# Patient Record
Sex: FEMALE | Race: WHITE | Employment: OTHER | URBAN - METROPOLITAN AREA
[De-identification: names, ages, dates, MRNs, and addresses within clinical notes are randomized per-mention and may not be internally consistent; named-entity substitution may affect disease eponyms.]

---

## 2017-01-12 ENCOUNTER — OFFICE VISIT (OUTPATIENT)
Dept: NEUROLOGY | Facility: CLINIC | Age: 82
End: 2017-01-12

## 2017-01-12 VITALS
BODY MASS INDEX: 19.29 KG/M2 | WEIGHT: 113 LBS | SYSTOLIC BLOOD PRESSURE: 112 MMHG | DIASTOLIC BLOOD PRESSURE: 76 MMHG | OXYGEN SATURATION: 96 % | HEART RATE: 90 BPM | HEIGHT: 64 IN

## 2017-01-12 DIAGNOSIS — M54.16 LUMBAR RADICULOPATHY: ICD-10-CM

## 2017-01-12 DIAGNOSIS — M48.061 SPINAL STENOSIS, LUMBAR REGION, WITHOUT NEUROGENIC CLAUDICATION: ICD-10-CM

## 2017-01-12 DIAGNOSIS — M85.69: ICD-10-CM

## 2017-01-12 DIAGNOSIS — M51.37 DEGENERATION OF LUMBAR OR LUMBOSACRAL INTERVERTEBRAL DISC: ICD-10-CM

## 2017-01-12 DIAGNOSIS — G89.29 CHRONIC LEFT-SIDED LOW BACK PAIN WITH LEFT-SIDED SCIATICA: ICD-10-CM

## 2017-01-12 DIAGNOSIS — M54.42 CHRONIC LEFT-SIDED LOW BACK PAIN WITH LEFT-SIDED SCIATICA: ICD-10-CM

## 2017-01-12 DIAGNOSIS — M53.3 CHRONIC LEFT SACROILIAC JOINT PAIN: ICD-10-CM

## 2017-01-12 DIAGNOSIS — M51.9 OTHER AND UNSPECIFIED DISC DISORDER OF LUMBAR REGION: ICD-10-CM

## 2017-01-12 DIAGNOSIS — S32.009A: ICD-10-CM

## 2017-01-12 DIAGNOSIS — M43.10 ACQUIRED SPONDYLOLISTHESIS: Primary | ICD-10-CM

## 2017-01-12 DIAGNOSIS — G89.29 CHRONIC LEFT SACROILIAC JOINT PAIN: ICD-10-CM

## 2017-01-12 PROCEDURE — 99214 OFFICE O/P EST MOD 30 MIN: CPT | Performed by: PHYSICAL MEDICINE & REHABILITATION

## 2017-01-12 NOTE — PATIENT INSTRUCTIONS
Refill policies:    • Allow 2 business days for refills; controlled substances may take longer. • Contact our office at least 5 days prior to running out of medication or submit request through the “request refill” option in your [employer] account.   • Ref have a procedure or additional testing performed. [facility]) will contact your insurance carrier to obtain pre-certification or prior authorization.     Unfortunately, [facility] has seen an increase in denial of payment even though the p

## 2017-01-12 NOTE — PROGRESS NOTES
Low Back Pain H & P    Chief Complaint: Patient presents with:  Low Back Pain: Pt c/o left sided low back pain radiating to the back of her left knee. Pain varies from 3-7/10. Current pain level is 3/10 today. Pain increases when walking,standing,sitting.  P upper and lower endoscopy; 2011; Dr Gildardo Giles because of anemia - normal.    BENIGN BIOPSY RIGHT  40 years ago    IR VERTEBROPLASTY         Family History   Family History   Problem Relation Age of Onset   • [other] [OTHER] Father      cva   • Cancer Mother 40 degrees Painless with bilateral low back tightness   Extension with LEFT rotation: 40 degrees Painless with bilateral low back tightness     Lumbar Spine Palpation:    Spinous Processes: Non-tender for all Spinous Processes   Z-joints: Non-tender for al Gaenslen's Test: Negative    Assessment  1. L3-4 stable>L4-5 unstable grade1 spondylolisthesis    2. left L4-5 mod synovial cyst    3. L5-S1 lt mod far lat,L4-5 lt>rt mild-mod discs    4. L1-2rt mod paris, L3-4 & L5-S1 mild diff discs    5.  L4-5 lt mod lat

## 2017-01-12 NOTE — PROGRESS NOTES
Patient has been scheduled for a left SI joint injection  on 02/03/17 at [facility]. Medications and allergies reviewed. Patient informed to hold aspirins, nsaids, blood thinners, vitamins and fish oils 5-7 days prior to procedure.  Patient informed we will ne

## 2017-01-16 ENCOUNTER — TELEPHONE (OUTPATIENT)
Dept: NEUROLOGY | Facility: CLINIC | Age: 82
End: 2017-01-16

## 2017-01-30 ENCOUNTER — TELEPHONE (OUTPATIENT)
Dept: NEUROLOGY | Facility: CLINIC | Age: 82
End: 2017-01-30

## 2017-01-31 NOTE — TELEPHONE ENCOUNTER
Spoke to patient. She states that she does not need to reschedule and 2/10 should work for her. She will await a phone call from [facility] to notify her of time for injection on 2/10.

## 2017-02-10 ENCOUNTER — OFFICE VISIT (OUTPATIENT)
Dept: SURGERY | Facility: CLINIC | Age: 82
End: 2017-02-10

## 2017-02-10 DIAGNOSIS — M53.3 CHRONIC LEFT SACROILIAC JOINT PAIN: Primary | ICD-10-CM

## 2017-02-10 DIAGNOSIS — G89.29 CHRONIC LEFT SACROILIAC JOINT PAIN: Primary | ICD-10-CM

## 2017-02-10 PROCEDURE — 27096 INJECT SACROILIAC JOINT: CPT | Performed by: PHYSICAL MEDICINE & REHABILITATION

## 2017-02-10 NOTE — PROCEDURES
Bryant Rizvi.    UNILATERAL SACROILIAC JOINT INJECTION  NAME:  Roslyn Sandy    MR #:    [id number] :  1933     PHYSICIAN:  Jocelyn Linda        Operative Report    DATE OF PROCEDURE: 2/10/2017   PREOPERATIVE DIAGNOSES: 1.  C anytime.

## 2017-02-15 ENCOUNTER — TELEPHONE (OUTPATIENT)
Dept: NEUROLOGY | Facility: CLINIC | Age: 82
End: 2017-02-15

## 2017-02-15 DIAGNOSIS — M51.37 DEGENERATION OF LUMBAR OR LUMBOSACRAL INTERVERTEBRAL DISC: ICD-10-CM

## 2017-02-15 DIAGNOSIS — M54.42 CHRONIC LEFT-SIDED LOW BACK PAIN WITH LEFT-SIDED SCIATICA: ICD-10-CM

## 2017-02-15 DIAGNOSIS — M54.16 LUMBAR RADICULOPATHY: ICD-10-CM

## 2017-02-15 DIAGNOSIS — M43.10 ACQUIRED SPONDYLOLISTHESIS: Primary | ICD-10-CM

## 2017-02-15 DIAGNOSIS — G89.29 CHRONIC LEFT SACROILIAC JOINT PAIN: ICD-10-CM

## 2017-02-15 DIAGNOSIS — M53.3 CHRONIC LEFT SACROILIAC JOINT PAIN: ICD-10-CM

## 2017-02-15 DIAGNOSIS — G89.29 CHRONIC LEFT-SIDED LOW BACK PAIN WITH LEFT-SIDED SCIATICA: ICD-10-CM

## 2017-02-15 NOTE — TELEPHONE ENCOUNTER
Left message advising Pt. insurance was verified and MRI L-spine wo is a covered benefit and does not require authorization. Can proceed with scheduling appt. Chava Rice

## 2017-02-15 NOTE — TELEPHONE ENCOUNTER
Patient calling with condition update post left sacroiliac joint injection on 2/10/17. Patient states she does not notice any relief and has the same cramping, tightness and pain in the outer left side low back and left lateral hip area.  Pain is intermitte

## 2017-02-24 ENCOUNTER — HOSPITAL ENCOUNTER (OUTPATIENT)
Dept: MRI IMAGING | Facility: HOSPITAL | Age: 82
Discharge: HOME | End: 2017-02-24
Attending: PHYSICAL MEDICINE & REHABILITATION
Payer: MEDICARE

## 2017-02-24 ENCOUNTER — HOSPITAL ENCOUNTER (OUTPATIENT)
Dept: GENERAL RADIOLOGY | Facility: HOSPITAL | Age: 82
Discharge: HOME | End: 2017-02-24
Attending: PHYSICAL MEDICINE & REHABILITATION
Payer: MEDICARE

## 2017-02-24 DIAGNOSIS — M54.16 LUMBAR RADICULOPATHY: ICD-10-CM

## 2017-02-24 DIAGNOSIS — M51.37 DEGENERATION OF LUMBAR OR LUMBOSACRAL INTERVERTEBRAL DISC: ICD-10-CM

## 2017-02-24 DIAGNOSIS — G89.29 CHRONIC LEFT SACROILIAC JOINT PAIN: ICD-10-CM

## 2017-02-24 DIAGNOSIS — M54.42 CHRONIC LEFT-SIDED LOW BACK PAIN WITH LEFT-SIDED SCIATICA: ICD-10-CM

## 2017-02-24 DIAGNOSIS — G89.29 CHRONIC LEFT-SIDED LOW BACK PAIN WITH LEFT-SIDED SCIATICA: ICD-10-CM

## 2017-02-24 DIAGNOSIS — M43.10 ACQUIRED SPONDYLOLISTHESIS: ICD-10-CM

## 2017-02-24 DIAGNOSIS — M53.3 CHRONIC LEFT SACROILIAC JOINT PAIN: ICD-10-CM

## 2017-02-24 PROCEDURE — 72120 X-RAY BEND ONLY L-S SPINE: CPT

## 2017-02-24 PROCEDURE — 72148 MRI LUMBAR SPINE W/O DYE: CPT

## 2017-03-23 ENCOUNTER — OFFICE VISIT (OUTPATIENT)
Dept: NEUROLOGY | Facility: CLINIC | Age: 82
End: 2017-03-23

## 2017-03-23 ENCOUNTER — TELEPHONE (OUTPATIENT)
Dept: NEUROLOGY | Facility: CLINIC | Age: 82
End: 2017-03-23

## 2017-03-23 VITALS
HEART RATE: 77 BPM | DIASTOLIC BLOOD PRESSURE: 60 MMHG | RESPIRATION RATE: 14 BRPM | SYSTOLIC BLOOD PRESSURE: 108 MMHG | HEIGHT: 64 IN

## 2017-03-23 DIAGNOSIS — M48.061 LUMBAR FORAMINAL STENOSIS: ICD-10-CM

## 2017-03-23 DIAGNOSIS — M54.16 LUMBAR RADICULOPATHY: ICD-10-CM

## 2017-03-23 DIAGNOSIS — S22.000D THORACIC COMPRESSION FRACTURE, WITH ROUTINE HEALING, SUBSEQUENT ENCOUNTER: ICD-10-CM

## 2017-03-23 DIAGNOSIS — G89.29 CHRONIC LEFT-SIDED THORACIC BACK PAIN: ICD-10-CM

## 2017-03-23 DIAGNOSIS — M54.6 CHRONIC LEFT-SIDED THORACIC BACK PAIN: ICD-10-CM

## 2017-03-23 DIAGNOSIS — M43.16 SPONDYLOLISTHESIS, LUMBAR REGION: ICD-10-CM

## 2017-03-23 DIAGNOSIS — S32.000D LUMBAR COMPRESSION FRACTURE, WITH ROUTINE HEALING, SUBSEQUENT ENCOUNTER: ICD-10-CM

## 2017-03-23 DIAGNOSIS — M54.42 CHRONIC LEFT-SIDED LOW BACK PAIN WITH LEFT-SIDED SCIATICA: Primary | ICD-10-CM

## 2017-03-23 DIAGNOSIS — G89.29 CHRONIC LEFT-SIDED LOW BACK PAIN WITH LEFT-SIDED SCIATICA: Primary | ICD-10-CM

## 2017-03-23 PROBLEM — S22.000A THORACIC COMPRESSION FRACTURE (HCC): Status: ACTIVE | Noted: 2017-03-23

## 2017-03-23 PROBLEM — S32.000A LUMBAR COMPRESSION FRACTURE (HCC): Status: ACTIVE | Noted: 2017-03-23

## 2017-03-23 PROCEDURE — 99214 OFFICE O/P EST MOD 30 MIN: CPT | Performed by: PHYSICAL MEDICINE & REHABILITATION

## 2017-03-23 RX ORDER — AZELASTINE 1 MG/ML
SPRAY, METERED NASAL 2 TIMES DAILY PRN
COMMUNITY
Start: 2017-03-07 | End: 2017-08-31

## 2017-03-23 NOTE — TELEPHONE ENCOUNTER
Pt. informed insurance was verified and Physical therapy is a covered benefit and does not require authorization. Can proceed with scheduling appt.

## 2017-03-23 NOTE — PROGRESS NOTES
Low Back Pain H & P    Chief Complaint: Patient presents with:  Low Back Pain: pt here for follow up after left SI Joint injections on 2/10/17 with no relief at all.  pt continues to have intermittent mid-lower back pain  LOV 1/12/17      Patient was last s cva   • Cancer Mother [de-identified]     breast cancer   • Breast Cancer Mother    • Cancer Other      breast   • Breast Cancer Maternal Aunt        Social History     Social History   Marital Status:   Spouse Name: N/A    Years of Education: N/A  Number o bilateral Lower Extremities   LE Muscle Strength:  All LE strength measurements 5/5 except:  Hamstring LEFT:   4+/5   RIGHT plantar reflexes: downward response   LEFT plantar reflexes: downward response   Reflexes: 2+ in bilateral lower extremities     Radi

## 2017-03-23 NOTE — PATIENT INSTRUCTIONS
As of October 6th 2014, the Drug Enforcement Agency [facility]) is reclassifying all hydrocodone combination medications from Schedule III to Schedule II. This includes medications such as Norco, Vicodin, Lortab, Zohydro, and Vicoprofen.     What this means for y chart.      Plan  She will start PT again. The patient will continue with her home exercise program.    The patient does not need any injections at this time. The patient does not need any pain medications at this time.     She will follow up in 6 mon

## 2017-07-17 PROCEDURE — 82607 VITAMIN B-12: CPT | Performed by: INTERNAL MEDICINE

## 2017-07-17 PROCEDURE — 82746 ASSAY OF FOLIC ACID SERUM: CPT | Performed by: INTERNAL MEDICINE

## 2017-07-26 PROBLEM — M48.061 LUMBAR SPINAL STENOSIS: Status: ACTIVE | Noted: 2017-07-26

## 2017-08-09 PROBLEM — M54.16 LUMBAR RADICULITIS: Status: ACTIVE | Noted: 2017-08-09

## 2017-08-31 PROBLEM — R25.2 CRAMP OF EXTREMITY: Status: ACTIVE | Noted: 2017-08-31

## 2017-09-29 PROCEDURE — 87186 SC STD MICRODIL/AGAR DIL: CPT | Performed by: UROLOGY

## 2017-09-29 PROCEDURE — 87086 URINE CULTURE/COLONY COUNT: CPT | Performed by: UROLOGY

## 2017-09-29 PROCEDURE — 87077 CULTURE AEROBIC IDENTIFY: CPT | Performed by: UROLOGY

## 2017-10-05 PROBLEM — K21.9 GASTROESOPHAGEAL REFLUX DISEASE WITHOUT ESOPHAGITIS: Status: ACTIVE | Noted: 2017-10-05

## 2017-10-05 PROBLEM — R49.0 HOARSENESS OF VOICE: Status: ACTIVE | Noted: 2017-10-05

## 2017-12-04 PROCEDURE — 87086 URINE CULTURE/COLONY COUNT: CPT | Performed by: UROLOGY

## 2017-12-09 PROCEDURE — 87086 URINE CULTURE/COLONY COUNT: CPT | Performed by: PHYSICIAN ASSISTANT

## 2017-12-19 PROCEDURE — 87086 URINE CULTURE/COLONY COUNT: CPT | Performed by: UROLOGY

## 2017-12-19 PROCEDURE — 87186 SC STD MICRODIL/AGAR DIL: CPT | Performed by: UROLOGY

## 2017-12-19 PROCEDURE — 87088 URINE BACTERIA CULTURE: CPT | Performed by: UROLOGY

## 2018-01-07 ENCOUNTER — HOSPITAL ENCOUNTER (EMERGENCY)
Facility: HOSPITAL | Age: 83
Discharge: HOME | End: 2018-01-07
Attending: EMERGENCY MEDICINE
Payer: MEDICARE

## 2018-01-07 ENCOUNTER — APPOINTMENT (OUTPATIENT)
Dept: GENERAL RADIOLOGY | Facility: HOSPITAL | Age: 83
End: 2018-01-07
Attending: EMERGENCY MEDICINE
Payer: MEDICARE

## 2018-01-07 VITALS
RESPIRATION RATE: 16 BRPM | OXYGEN SATURATION: 95 % | WEIGHT: 113 LBS | HEIGHT: 64 IN | BODY MASS INDEX: 19.29 KG/M2 | DIASTOLIC BLOOD PRESSURE: 57 MMHG | TEMPERATURE: 100 F | SYSTOLIC BLOOD PRESSURE: 159 MMHG | HEART RATE: 82 BPM

## 2018-01-07 DIAGNOSIS — J11.1 INFLUENZA: Primary | ICD-10-CM

## 2018-01-07 LAB
ANION GAP SERPL CALC-SCNC: 11 MMOL/L (ref 0–18)
BASOPHILS # BLD: 0 K/UL (ref 0–0.2)
BASOPHILS NFR BLD: 1 %
BILIRUB UR QL: NEGATIVE
BUN SERPL-MCNC: 9 MG/DL (ref 8–20)
BUN/CREAT SERPL: 15.3 (ref 10–20)
CALCIUM SERPL-MCNC: 8.8 MG/DL (ref 8.5–10.5)
CHLORIDE SERPL-SCNC: 97 MMOL/L (ref 95–110)
CLARITY UR: CLEAR
CO2 SERPL-SCNC: 22 MMOL/L (ref 22–32)
CREAT SERPL-MCNC: 0.59 MG/DL (ref 0.5–1.5)
EOSINOPHIL # BLD: 0 K/UL (ref 0–0.7)
EOSINOPHIL NFR BLD: 1 %
ERYTHROCYTE [DISTWIDTH] IN BLOOD BY AUTOMATED COUNT: 13.9 % (ref 11–15)
FLUAV + FLUBV RNA SPEC NAA+PROBE: NEGATIVE
FLUAV + FLUBV RNA SPEC NAA+PROBE: NEGATIVE
FLUAV + FLUBV RNA SPEC NAA+PROBE: POSITIVE
GLUCOSE SERPL-MCNC: 128 MG/DL (ref 70–99)
GLUCOSE UR-MCNC: NEGATIVE MG/DL
HCT VFR BLD AUTO: 33.8 % (ref 35–48)
HGB BLD-MCNC: 11.1 G/DL (ref 12–16)
HGB UR QL STRIP.AUTO: NEGATIVE
LEUKOCYTE ESTERASE UR QL STRIP.AUTO: NEGATIVE
LYMPHOCYTES # BLD: 0.3 K/UL (ref 1–4)
LYMPHOCYTES NFR BLD: 9 %
MCH RBC QN AUTO: 30 PG (ref 27–32)
MCHC RBC AUTO-ENTMCNC: 32.7 G/DL (ref 32–37)
MCV RBC AUTO: 91.6 FL (ref 80–100)
MONOCYTES # BLD: 0.6 K/UL (ref 0–1)
MONOCYTES NFR BLD: 17 %
NEUTROPHILS # BLD AUTO: 2.7 K/UL (ref 1.8–7.7)
NEUTROPHILS NFR BLD: 72 %
NITRITE UR QL STRIP.AUTO: POSITIVE
OSMOLALITY UR CALC.SUM OF ELEC: 270 MOSM/KG (ref 275–295)
PH UR: 7 [PH] (ref 5–8)
PLATELET # BLD AUTO: 173 K/UL (ref 140–400)
PMV BLD AUTO: 8 FL (ref 7.4–10.3)
POTASSIUM SERPL-SCNC: 3.5 MMOL/L (ref 3.3–5.1)
PROT UR-MCNC: NEGATIVE MG/DL
RBC # BLD AUTO: 3.69 M/UL (ref 3.7–5.4)
RBC #/AREA URNS AUTO: 0 /HPF
SODIUM SERPL-SCNC: 130 MMOL/L (ref 136–144)
SP GR UR STRIP: 1.01 (ref 1–1.03)
UROBILINOGEN UR STRIP-ACNC: 4
VIT C UR-MCNC: 40 MG/DL
WBC # BLD AUTO: 3.7 K/UL (ref 4–11)
WBC #/AREA URNS AUTO: 3 /HPF

## 2018-01-07 PROCEDURE — 36415 COLL VENOUS BLD VENIPUNCTURE: CPT

## 2018-01-07 PROCEDURE — 85025 COMPLETE CBC W/AUTO DIFF WBC: CPT | Performed by: EMERGENCY MEDICINE

## 2018-01-07 PROCEDURE — 87631 RESP VIRUS 3-5 TARGETS: CPT | Performed by: EMERGENCY MEDICINE

## 2018-01-07 PROCEDURE — 80048 BASIC METABOLIC PNL TOTAL CA: CPT | Performed by: EMERGENCY MEDICINE

## 2018-01-07 PROCEDURE — 81001 URINALYSIS AUTO W/SCOPE: CPT | Performed by: EMERGENCY MEDICINE

## 2018-01-07 PROCEDURE — 71045 X-RAY EXAM CHEST 1 VIEW: CPT | Performed by: EMERGENCY MEDICINE

## 2018-01-07 PROCEDURE — 99284 EMERGENCY DEPT VISIT MOD MDM: CPT

## 2018-01-07 RX ORDER — OSELTAMIVIR PHOSPHATE 75 MG/1
75 CAPSULE ORAL 2 TIMES DAILY
Qty: 10 CAPSULE | Refills: 0 | Status: SHIPPED | OUTPATIENT
Start: 2018-01-07 | End: 2018-01-12

## 2018-01-07 RX ORDER — IBUPROFEN 400 MG/1
400 TABLET ORAL ONCE
Status: COMPLETED | OUTPATIENT
Start: 2018-01-07 | End: 2018-01-07

## 2018-01-07 RX ORDER — ACETAMINOPHEN 500 MG
1000 TABLET ORAL ONCE
Status: COMPLETED | OUTPATIENT
Start: 2018-01-07 | End: 2018-01-07

## 2018-01-07 NOTE — ED INITIAL ASSESSMENT (HPI)
Resident of [facility], started feeling weak with chills and cough with white sputum this morning.

## 2018-01-08 NOTE — ED PROVIDER NOTES
Patient Seen in: [facility] Emergency Department    History   Patient presents with:  Fatigue (constitutional, neurologic)      HPI    Patient presents from her care home community due to feeling weak this morning with associated chills, body ach Yes    Social History Narrative    The patient does not use an assistive device. .      The patient does live in a home with stairs.         ROS  Pertinent Positives: Cough, congestion, weak, body aches, chills  All other organ systems are reviewed and for the following:     Influenza B by PCR Positive (*)     All other components within normal limits   CBC W/ DIFFERENTIAL - Abnormal; Notable for the following:     WBC 3.7 (*)     RBC 3.69 (*)     HGB 11.1 (*)     HCT 33.8 (*)     Lymphocyte Absolute 0.3 Hypothyroid; Recurrent UTI; H/O: hysterectomy; Factor V Leiden mutation ([facility] Compression fracture; Anemia; Actinic keratosis; PAF (paroxysmal atrial fibrillation) ([facility] Senile osteoporosis;  Pathologic fracture of vertebrae; L3-4 stable>L4-5 unstable gra Course: Presents with influenza symptoms starting this morning. No distress on examination, appears well. Vital signs stable, laboratory testing positive for influenza B, otherwise unremarkable.   Patient feeling better in the ED, given instructions on [place]

## 2018-01-08 NOTE — ED NOTES
[facility] called for medicar back to [facility].   ETA 60 minutes

## 2019-02-26 ENCOUNTER — OFFICE VISIT (OUTPATIENT)
Dept: INTEGRATIVE MEDICINE | Facility: CLINIC | Age: 84
End: 2019-02-26

## 2019-02-26 DIAGNOSIS — M54.9 UPPER BACK PAIN: Primary | ICD-10-CM

## 2019-02-26 NOTE — PROGRESS NOTES
Justina High is a 80year old female Acupuncture Therapy. Complaints:  1. Upper back ache and pain 5/10  2. Trapezius stiffness 5/10  3.  Right hip pain    Initial intake: Patient has been have back pain for ten years since suffering a compression frac

## 2019-03-05 ENCOUNTER — OFFICE VISIT (OUTPATIENT)
Dept: INTEGRATIVE MEDICINE | Facility: CLINIC | Age: 84
End: 2019-03-05

## 2019-03-05 DIAGNOSIS — G89.29 CHRONIC BACK PAIN, UNSPECIFIED BACK LOCATION, UNSPECIFIED BACK PAIN LATERALITY: Primary | ICD-10-CM

## 2019-03-05 DIAGNOSIS — M54.9 CHRONIC BACK PAIN, UNSPECIFIED BACK LOCATION, UNSPECIFIED BACK PAIN LATERALITY: Primary | ICD-10-CM

## 2019-03-05 NOTE — PROGRESS NOTES
Esequiel Morgan is a 80year old female Acupuncture Therapy. Complaints:  1. Upper back ache and pain 3-4/10  2. Trapezius stiffness 3-4/10  3. Right hip pain  4.  Sinus congestion -chronic     Initial intake: Patient has been have back pain for ten years

## 2019-03-05 NOTE — PATIENT INSTRUCTIONS
Drink plenty of water - at least 8 glasses per day and avoid caffeine.   Continue putting heat on low back

## 2019-03-07 ENCOUNTER — OFFICE VISIT (OUTPATIENT)
Dept: INTEGRATIVE MEDICINE | Facility: CLINIC | Age: 84
End: 2019-03-07

## 2019-03-07 DIAGNOSIS — M54.2 BILATERAL NECK PAIN: ICD-10-CM

## 2019-03-08 NOTE — PROGRESS NOTES
Zaid Gao is a 80year old female Acupuncture Therapy. Complaints:  1. Upper back ache and pain 3-4/10  2. Trapezius stiffness 3-4/10  3. Right hip pain  4.  Sinus congestion -chronic     Initial intake: Patient has been have back pain for ten years

## 2019-03-12 ENCOUNTER — OFFICE VISIT (OUTPATIENT)
Dept: INTEGRATIVE MEDICINE | Facility: CLINIC | Age: 84
End: 2019-03-12

## 2019-03-12 DIAGNOSIS — M54.2 NECK PAIN: Primary | ICD-10-CM

## 2019-03-12 NOTE — PROGRESS NOTES
Paul Player is a 80year old female Acupuncture Therapy. Complaints:  1. Upper back ache and pain 5/10  2. Trapezius stiffness 3-4/10  3. Right hip pain  4.  Sinus congestion -chronic     Initial intake: Patient has been have back pain for ten years s

## 2019-03-13 ENCOUNTER — OFFICE VISIT (OUTPATIENT)
Dept: FAMILY MEDICINE CLINIC | Facility: CLINIC | Age: 84
End: 2019-03-13
Payer: MEDICARE

## 2019-03-13 DIAGNOSIS — M62.9 HAMSTRING TIGHTNESS OF BOTH LOWER EXTREMITIES: ICD-10-CM

## 2019-03-13 DIAGNOSIS — M99.01 CERVICAL SEGMENT DYSFUNCTION: ICD-10-CM

## 2019-03-13 DIAGNOSIS — M99.08 RIB CAGE REGION SOMATIC DYSFUNCTION: ICD-10-CM

## 2019-03-13 DIAGNOSIS — M54.2 NECK PAIN: ICD-10-CM

## 2019-03-13 DIAGNOSIS — M54.6 BILATERAL THORACIC BACK PAIN, UNSPECIFIED CHRONICITY: ICD-10-CM

## 2019-03-13 DIAGNOSIS — G89.29 CHRONIC BILATERAL LOW BACK PAIN WITH SCIATICA, SCIATICA LATERALITY UNSPECIFIED: Primary | ICD-10-CM

## 2019-03-13 DIAGNOSIS — M99.00 HEAD REGION SOMATIC DYSFUNCTION: ICD-10-CM

## 2019-03-13 DIAGNOSIS — G57.03 PIRIFORMIS SYNDROME OF BOTH SIDES: ICD-10-CM

## 2019-03-13 DIAGNOSIS — M99.03 SOMATIC DYSFUNCTION OF LUMBAR REGION: ICD-10-CM

## 2019-03-13 DIAGNOSIS — M54.40 CHRONIC BILATERAL LOW BACK PAIN WITH SCIATICA, SCIATICA LATERALITY UNSPECIFIED: Primary | ICD-10-CM

## 2019-03-13 DIAGNOSIS — M99.04 SOMATIC DYSFUNCTION OF SACRAL REGION: ICD-10-CM

## 2019-03-13 PROCEDURE — 99214 OFFICE O/P EST MOD 30 MIN: CPT | Performed by: FAMILY MEDICINE

## 2019-03-13 PROCEDURE — 98929 OSTEOPATH MANJ 9-10 REGIONS: CPT | Performed by: FAMILY MEDICINE

## 2019-03-13 NOTE — PATIENT INSTRUCTIONS
Breathing. Self activations. Proper exercising. Counseling. Positive mental imaging. Follow-up in 3-4 weeks, as needed. Call with questions or problems. Discussed bruising.

## 2019-03-13 NOTE — PROGRESS NOTES
HPI:    Patient ID: Justina High is a 80year old female. Patient with chronic low back pain. Left greater than right. Complaints of weakness to left leg. Right hamstring greater than left hamstring tightness.   States she has glutes Fernando tendinitis 0/0  Hamstring 45 degrees  Quad 0/0  Decrease quadratus lumborum/laterals  Decreased shoulder/sternocleidomastoid  Piriformis 100 degrees  Elevated first rib  Jaw tight  Visuals decreased distant  Activations x9  Abdominal breathing  Psoas/glutes 2/2  Hams

## 2019-03-14 ENCOUNTER — OFFICE VISIT (OUTPATIENT)
Dept: INTEGRATIVE MEDICINE | Facility: CLINIC | Age: 84
End: 2019-03-14

## 2019-03-14 DIAGNOSIS — M54.2 NECK PAIN: Primary | ICD-10-CM

## 2019-03-19 ENCOUNTER — OFFICE VISIT (OUTPATIENT)
Dept: INTEGRATIVE MEDICINE | Facility: CLINIC | Age: 84
End: 2019-03-19

## 2019-03-19 DIAGNOSIS — G89.29 CHRONIC BACK PAIN, UNSPECIFIED BACK LOCATION, UNSPECIFIED BACK PAIN LATERALITY: Primary | ICD-10-CM

## 2019-03-19 DIAGNOSIS — M54.9 CHRONIC BACK PAIN, UNSPECIFIED BACK LOCATION, UNSPECIFIED BACK PAIN LATERALITY: Primary | ICD-10-CM

## 2019-03-19 NOTE — PROGRESS NOTES
Korey Chase is a 80year old female Acupuncture Therapy. Complaints:  1. Upper back ache and pain 3/10  2. Trapezius stiffness 3-4/10  3. Right hip pain  4.  Sinus congestion -chronic     Initial intake: Patient has been have back pain for ten years s

## 2019-03-21 ENCOUNTER — OFFICE VISIT (OUTPATIENT)
Dept: INTEGRATIVE MEDICINE | Facility: CLINIC | Age: 84
End: 2019-03-21

## 2019-03-21 DIAGNOSIS — M54.2 BILATERAL NECK PAIN: ICD-10-CM

## 2019-03-26 ENCOUNTER — OFFICE VISIT (OUTPATIENT)
Dept: INTEGRATIVE MEDICINE | Facility: CLINIC | Age: 84
End: 2019-03-26

## 2019-03-26 DIAGNOSIS — M54.9 UPPER BACK PAIN: Primary | ICD-10-CM

## 2019-03-26 NOTE — PROGRESS NOTES
Vani Stanley is a 80year old female Acupuncture Therapy. Complaints:  1. Upper back ache and pain 3-4/10  2. Trapezius stiffness 3-4/10  3. Right hip pain  4.  Sinus congestion -chronic     Initial intake: Patient has been have back pain for ten years

## 2019-03-26 NOTE — PATIENT INSTRUCTIONS
Continue to utilize heat 20 minutes on and drink plently of fluids; at least 8-10 glasses of water per day.

## 2019-04-02 NOTE — PROGRESS NOTES
Jolynn Rodriguez is a 80year old female Acupuncture Therapy. Complaints:  1. Upper back ache and pain 5/10  2. Trapezius stiffness 3-4/10  3. Right hip pain  4.  Sinus congestion -chronic     Initial intake: Patient has been have back pain for ten years s

## 2019-04-03 ENCOUNTER — OFFICE VISIT (OUTPATIENT)
Dept: FAMILY MEDICINE CLINIC | Facility: CLINIC | Age: 84
End: 2019-04-03
Payer: MEDICARE

## 2019-04-03 DIAGNOSIS — M99.08 RIB CAGE REGION SOMATIC DYSFUNCTION: ICD-10-CM

## 2019-04-03 DIAGNOSIS — M99.04 SOMATIC DYSFUNCTION OF SACRAL REGION: ICD-10-CM

## 2019-04-03 DIAGNOSIS — G57.01 PIRIFORMIS SYNDROME OF RIGHT SIDE: ICD-10-CM

## 2019-04-03 DIAGNOSIS — M54.2 NECK PAIN: ICD-10-CM

## 2019-04-03 DIAGNOSIS — M54.6 BILATERAL THORACIC BACK PAIN, UNSPECIFIED CHRONICITY: ICD-10-CM

## 2019-04-03 DIAGNOSIS — M99.00 HEAD REGION SOMATIC DYSFUNCTION: ICD-10-CM

## 2019-04-03 DIAGNOSIS — M99.03 SOMATIC DYSFUNCTION OF LUMBAR REGION: ICD-10-CM

## 2019-04-03 DIAGNOSIS — M54.40 CHRONIC BILATERAL LOW BACK PAIN WITH SCIATICA, SCIATICA LATERALITY UNSPECIFIED: Primary | ICD-10-CM

## 2019-04-03 DIAGNOSIS — M99.01 CERVICAL SEGMENT DYSFUNCTION: ICD-10-CM

## 2019-04-03 DIAGNOSIS — M62.9 HAMSTRING TIGHTNESS OF BOTH LOWER EXTREMITIES: ICD-10-CM

## 2019-04-03 DIAGNOSIS — G89.29 CHRONIC BILATERAL LOW BACK PAIN WITH SCIATICA, SCIATICA LATERALITY UNSPECIFIED: Primary | ICD-10-CM

## 2019-04-03 PROCEDURE — 99214 OFFICE O/P EST MOD 30 MIN: CPT | Performed by: FAMILY MEDICINE

## 2019-04-03 PROCEDURE — 98929 OSTEOPATH MANJ 9-10 REGIONS: CPT | Performed by: FAMILY MEDICINE

## 2019-04-03 NOTE — PROGRESS NOTES
HPI:    Patient ID: Carolina  is a 80year old female. Patient improvement with activations. Low back pain improved. Thoracic back pain improved. Still some discomfort. Denies numbness, tingling. Hamstring tightness right greater than left.   Hi psoas flexed position  Elevated first rib  Jaw tight  Piriformis 90 degrees  Decrease hamstring bilateral  Decreased muscle strength with fear of falling  Activations x9  Abdominal breathing  Psoas/glutes 2/2  Quad 2/2  Psoas intact flexed position  Lower

## 2019-04-04 NOTE — PROGRESS NOTES
Vani Stanley is a 80year old female Acupuncture Therapy. Complaints:  1. Upper back ache and pain 3/10  2. Trapezius stiffness 3-4/10  3. Right hip pain  4.  Sinus congestion -chronic     Initial intake: Patient has been have back pain for ten years s

## 2021-11-19 PROBLEM — E46 PROTEIN-CALORIE MALNUTRITION, UNSPECIFIED SEVERITY (HCC): Status: ACTIVE | Noted: 2021-11-19

## 2021-12-13 RX ORDER — ACETAMINOPHEN 500 MG
1000 TABLET ORAL ONCE
Status: CANCELLED | OUTPATIENT
Start: 2021-12-13 | End: 2021-12-13

## 2021-12-14 ENCOUNTER — LAB ENCOUNTER (OUTPATIENT)
Dept: LAB | Facility: HOSPITAL | Age: 86
End: 2021-12-14
Attending: OTOLARYNGOLOGY
Payer: MEDICARE

## 2021-12-14 DIAGNOSIS — J38.3 OTHER DISEASES OF VOCAL CORDS: ICD-10-CM

## 2021-12-17 ENCOUNTER — HOSPITAL ENCOUNTER (OUTPATIENT)
Facility: HOSPITAL | Age: 86
Setting detail: HOSPITAL OUTPATIENT SURGERY
Discharge: HOME | End: 2021-12-17
Attending: OTOLARYNGOLOGY | Admitting: OTOLARYNGOLOGY
Payer: MEDICARE

## 2021-12-17 ENCOUNTER — ANESTHESIA EVENT (OUTPATIENT)
Dept: SURGERY | Facility: HOSPITAL | Age: 86
End: 2021-12-17
Payer: MEDICARE

## 2021-12-17 ENCOUNTER — ANESTHESIA (OUTPATIENT)
Dept: SURGERY | Facility: HOSPITAL | Age: 86
End: 2021-12-17
Payer: MEDICARE

## 2021-12-17 VITALS
RESPIRATION RATE: 18 BRPM | DIASTOLIC BLOOD PRESSURE: 69 MMHG | WEIGHT: 119.69 LBS | SYSTOLIC BLOOD PRESSURE: 157 MMHG | HEIGHT: 64 IN | TEMPERATURE: 98 F | HEART RATE: 78 BPM | OXYGEN SATURATION: 100 % | BODY MASS INDEX: 20.43 KG/M2

## 2021-12-17 DIAGNOSIS — J37.0 CHRONIC LARYNGITIS: ICD-10-CM

## 2021-12-17 DIAGNOSIS — J38.3 OTHER DISEASES OF VOCAL CORDS: Primary | ICD-10-CM

## 2021-12-17 PROCEDURE — 3E0F8GC INTRODUCTION OF OTHER THERAPEUTIC SUBSTANCE INTO RESPIRATORY TRACT, VIA NATURAL OR ARTIFICIAL OPENING ENDOSCOPIC: ICD-10-PCS | Performed by: OTOLARYNGOLOGY

## 2021-12-17 DEVICE — PROLARYN GEL IS A WATER-BASED INJECTABLE GEL IMPLANT USED IN THE VOCAL FOLDS TO TREAT VOCAL FOLD INSUFFIENCY. PROLARYN GEL RESORBS WITHIN A PERIOD OF 3-6 MONTHS AND IS A TEMPORARY IMPLANT.
Type: IMPLANTABLE DEVICE | Site: THROAT | Status: FUNCTIONAL
Brand: PROLARYN GEL INJECTABLE IMPLANT

## 2021-12-17 RX ORDER — ONDANSETRON 2 MG/ML
4 INJECTION INTRAMUSCULAR; INTRAVENOUS AS NEEDED
Status: DISCONTINUED | OUTPATIENT
Start: 2021-12-17 | End: 2021-12-17

## 2021-12-17 RX ORDER — CLINDAMYCIN PHOSPHATE 900 MG/50ML
900 INJECTION INTRAVENOUS ONCE
Status: DISCONTINUED | OUTPATIENT
Start: 2021-12-17 | End: 2021-12-17 | Stop reason: HOSPADM

## 2021-12-17 RX ORDER — DEXAMETHASONE SODIUM PHOSPHATE 4 MG/ML
VIAL (ML) INJECTION AS NEEDED
Status: DISCONTINUED | OUTPATIENT
Start: 2021-12-17 | End: 2021-12-17 | Stop reason: SURG

## 2021-12-17 RX ORDER — HYDROCODONE BITARTRATE AND ACETAMINOPHEN 10; 325 MG/1; MG/1
1 TABLET ORAL AS NEEDED
Status: DISCONTINUED | OUTPATIENT
Start: 2021-12-17 | End: 2021-12-17

## 2021-12-17 RX ORDER — SODIUM CHLORIDE, SODIUM LACTATE, POTASSIUM CHLORIDE, CALCIUM CHLORIDE 600; 310; 30; 20 MG/100ML; MG/100ML; MG/100ML; MG/100ML
INJECTION, SOLUTION INTRAVENOUS CONTINUOUS
Status: DISCONTINUED | OUTPATIENT
Start: 2021-12-17 | End: 2021-12-17

## 2021-12-17 RX ORDER — LABETALOL HYDROCHLORIDE 5 MG/ML
INJECTION, SOLUTION INTRAVENOUS AS NEEDED
Status: DISCONTINUED | OUTPATIENT
Start: 2021-12-17 | End: 2021-12-17 | Stop reason: SURG

## 2021-12-17 RX ORDER — METOCLOPRAMIDE HYDROCHLORIDE 5 MG/ML
10 INJECTION INTRAMUSCULAR; INTRAVENOUS AS NEEDED
Status: DISCONTINUED | OUTPATIENT
Start: 2021-12-17 | End: 2021-12-17

## 2021-12-17 RX ORDER — LIDOCAINE HYDROCHLORIDE 10 MG/ML
INJECTION, SOLUTION EPIDURAL; INFILTRATION; INTRACAUDAL; PERINEURAL AS NEEDED
Status: DISCONTINUED | OUTPATIENT
Start: 2021-12-17 | End: 2021-12-17 | Stop reason: SURG

## 2021-12-17 RX ORDER — ACETAMINOPHEN 500 MG
1000 TABLET ORAL EVERY 6 HOURS PRN
COMMUNITY

## 2021-12-17 RX ORDER — NALOXONE HYDROCHLORIDE 0.4 MG/ML
80 INJECTION, SOLUTION INTRAMUSCULAR; INTRAVENOUS; SUBCUTANEOUS AS NEEDED
Status: DISCONTINUED | OUTPATIENT
Start: 2021-12-17 | End: 2021-12-17

## 2021-12-17 RX ORDER — ROCURONIUM BROMIDE 10 MG/ML
INJECTION, SOLUTION INTRAVENOUS AS NEEDED
Status: DISCONTINUED | OUTPATIENT
Start: 2021-12-17 | End: 2021-12-17 | Stop reason: SURG

## 2021-12-17 RX ORDER — HYDROMORPHONE HYDROCHLORIDE 1 MG/ML
0.4 INJECTION, SOLUTION INTRAMUSCULAR; INTRAVENOUS; SUBCUTANEOUS EVERY 5 MIN PRN
Status: DISCONTINUED | OUTPATIENT
Start: 2021-12-17 | End: 2021-12-17

## 2021-12-17 RX ORDER — ONDANSETRON 2 MG/ML
INJECTION INTRAMUSCULAR; INTRAVENOUS AS NEEDED
Status: DISCONTINUED | OUTPATIENT
Start: 2021-12-17 | End: 2021-12-17 | Stop reason: SURG

## 2021-12-17 RX ORDER — HYDROCODONE BITARTRATE AND ACETAMINOPHEN 10; 325 MG/1; MG/1
2 TABLET ORAL AS NEEDED
Status: DISCONTINUED | OUTPATIENT
Start: 2021-12-17 | End: 2021-12-17

## 2021-12-17 RX ADMIN — DEXAMETHASONE SODIUM PHOSPHATE 4 MG: 4 MG/ML VIAL (ML) INJECTION at 14:24:00

## 2021-12-17 RX ADMIN — ONDANSETRON 4 MG: 2 INJECTION INTRAMUSCULAR; INTRAVENOUS at 14:24:00

## 2021-12-17 RX ADMIN — ROCURONIUM BROMIDE 20 MG: 10 INJECTION, SOLUTION INTRAVENOUS at 14:12:00

## 2021-12-17 RX ADMIN — LIDOCAINE HYDROCHLORIDE 25 MG: 10 INJECTION, SOLUTION EPIDURAL; INFILTRATION; INTRACAUDAL; PERINEURAL at 14:11:00

## 2021-12-17 RX ADMIN — LABETALOL HYDROCHLORIDE 10 MG: 5 INJECTION, SOLUTION INTRAVENOUS at 14:24:00

## 2021-12-17 NOTE — ANESTHESIA PREPROCEDURE EVALUATION
PRE-OP EVALUATION    Patient Name: Mattie Paredes    Admit Diagnosis: Other diseases of vocal cords [J38.3]  Chronic laryngitis [J37.0]    Pre-op Diagnosis: Other diseases of vocal cords [J38.3]  Chronic laryngitis [J37.0]     Microsuspension Direc Epic Immunization Activity for administration details, Disp: 1 mL, Rfl: 0  cyclobenzaprine 5 MG Oral Tab, Take 1 tablet (5 mg total) by mouth nightly., Disp: 30 tablet, Rfl: 1  famotidine 20 MG Oral Tab, , Disp: , Rfl:   Triamcinolone Acetonide 55 MCG/ACT Never Smoker      Smokeless tobacco: Never Used    Alcohol use: No      Alcohol/week: 0.0 standard drinks      Drug use: No     Available pre-op labs reviewed.   Lab Results   Component Value Date    WBC 4.65 11/16/2021    RBC 4.33 11/16/2021    HGB 13.6 11

## 2021-12-17 NOTE — BRIEF OP NOTE
Pre-Operative Diagnosis: Other diseases of vocal cords [J38.3]  Chronic laryngitis [J37.0]     Post-Operative Diagnosis: Other diseases of vocal cords [G11. 3]Chronic laryngitis [J37.0]      Procedure Performed:    Microsuspension Direct Laryngoscopy with l

## 2021-12-17 NOTE — ANESTHESIA POSTPROCEDURE EVALUATION
[facility] Patient Status:  Hospital Outpatient Surgery   Age/Gender 80year old female MRN [id number]   Location [address] [facility] Attending Kishan Nur MD   Hosp Day # 0 PCP Archie Plasencia MD

## 2021-12-17 NOTE — ANESTHESIA PROCEDURE NOTES
Airway  Date/Time: 12/17/2021 2:15 PM  Urgency: elective    Airway not difficult    General Information and Staff    Patient location during procedure: OR  Anesthesiologist: Winnie Alfaro MD  Performed: anesthesiologist     Indications and Patient Con

## 2021-12-17 NOTE — H&P
[address] [place] [address] Patient Status:  Hospital Outpatient Surgery    1933 MRN [id number]   Location [address] [place] [address] Attending Kishan Nur MD   [facility] Day # 0 PCP Via KAILYN Honeycutt 500 MG Oral Tab, Take 1,000 mg by mouth every 6 (six) hours as needed for Pain., Disp: , Rfl: , 12/17/2021 at 1000  Levothyroxine Sodium 75 MCG Oral Tab, Take 1 tablet (75 mcg total) by mouth every morning before breakfast., Disp: 90 tablet, Rfl: 3, 12/17/ Cooperative. No apparent distress. Vital Signs:  Blood pressure 141/75, pulse 79, temperature 97.4 °F (36.3 °C), temperature source Temporal, resp. rate 16, height 5' 4\" (1.626 m), weight 119 lb 11.4 oz (54.3 kg), SpO2 99 %, not currently breastfeeding. mild-mod paracentral/right mod foraminal, C3-4 mild-mod central & right foraminal2-3 right mild paracentral bulging discs     C4-5 right mod HNP     C6-7 left mod foraminal, C4-5 mild central stenosis     Thoracic back pain     Other closed displaced fract

## 2021-12-17 NOTE — OPERATIVE REPORT
[facility] Patient Status:  [facility] Outpatient Surgery    1933 MRN [id number]   Location [address] PRE OP HOLDING Attending Montez Yoon MD   Hosp Day # 0 PCP Sanjuanita Ivy MD     MSDL Injection Op Note  P MD  12/17/2021  2:00 PM

## 2022-10-26 ENCOUNTER — LAB ENCOUNTER (OUTPATIENT)
Dept: LAB | Facility: HOSPITAL | Age: 87
End: 2022-10-26
Attending: OTOLARYNGOLOGY
Payer: MEDICARE

## 2022-10-26 DIAGNOSIS — Z01.818 PRE-OP TESTING: ICD-10-CM

## 2022-10-26 LAB — SARS-COV-2 RNA RESP QL NAA+PROBE: NOT DETECTED

## 2022-10-28 ENCOUNTER — ANESTHESIA EVENT (OUTPATIENT)
Dept: SURGERY | Facility: HOSPITAL | Age: 87
End: 2022-10-28
Payer: MEDICARE

## 2022-10-28 ENCOUNTER — HOSPITAL ENCOUNTER (OUTPATIENT)
Facility: HOSPITAL | Age: 87
Setting detail: HOSPITAL OUTPATIENT SURGERY
Discharge: HOME | End: 2022-10-28
Attending: OTOLARYNGOLOGY | Admitting: OTOLARYNGOLOGY
Payer: MEDICARE

## 2022-10-28 ENCOUNTER — ANESTHESIA (OUTPATIENT)
Dept: SURGERY | Facility: HOSPITAL | Age: 87
End: 2022-10-28
Payer: MEDICARE

## 2022-10-28 VITALS
BODY MASS INDEX: 20.35 KG/M2 | SYSTOLIC BLOOD PRESSURE: 165 MMHG | WEIGHT: 119.19 LBS | DIASTOLIC BLOOD PRESSURE: 74 MMHG | TEMPERATURE: 97 F | OXYGEN SATURATION: 98 % | RESPIRATION RATE: 18 BRPM | HEIGHT: 64 IN | HEART RATE: 77 BPM

## 2022-10-28 DIAGNOSIS — Z01.818 PRE-OP TESTING: Primary | ICD-10-CM

## 2022-10-28 PROCEDURE — 3E0F8GC INTRODUCTION OF OTHER THERAPEUTIC SUBSTANCE INTO RESPIRATORY TRACT, VIA NATURAL OR ARTIFICIAL OPENING ENDOSCOPIC: ICD-10-PCS | Performed by: OTOLARYNGOLOGY

## 2022-10-28 DEVICE — PROLARYN PLUS IS A WATER BASED INJECTABLE GEL IMPLANT USED TO TREAT VOCAL FOLD INSUFFICIENCY. THE PRINCIPLE COMPONENT OF PROLARYN PLUS IS SYNTHETIC CALCIUM HYDROXYAPATITE, A BIOMATERIAL FOUND IN BONE AND TEETH. INJECTION WITH PROLARYN PLUS AUGMENTS OR BULKS UPS THE DISPLACED OR DAMAGED VOCAL FOLD SO THAT IT CAN IMPROVE SPEAKING. THE RESULT IS LONG TERM RESTORATION AND AUGMENTATION. PROLARYN PLUS CAN BE INJECTED WITH A 26 GAUGE OR LARGER DIAMETER THIN-WALL-NEEDLE.
Type: IMPLANTABLE DEVICE | Site: THROAT | Status: FUNCTIONAL
Brand: PROLARYN PLUS INJECTABLE IMPLANT

## 2022-10-28 RX ORDER — ACETAMINOPHEN 500 MG
1000 TABLET ORAL ONCE AS NEEDED
Status: DISCONTINUED | OUTPATIENT
Start: 2022-10-28 | End: 2022-10-28

## 2022-10-28 RX ORDER — LIDOCAINE HYDROCHLORIDE 10 MG/ML
INJECTION, SOLUTION EPIDURAL; INFILTRATION; INTRACAUDAL; PERINEURAL AS NEEDED
Status: DISCONTINUED | OUTPATIENT
Start: 2022-10-28 | End: 2022-10-28 | Stop reason: SURG

## 2022-10-28 RX ORDER — METOCLOPRAMIDE HYDROCHLORIDE 5 MG/ML
10 INJECTION INTRAMUSCULAR; INTRAVENOUS EVERY 8 HOURS PRN
Status: DISCONTINUED | OUTPATIENT
Start: 2022-10-28 | End: 2022-10-28

## 2022-10-28 RX ORDER — ACETAMINOPHEN 500 MG
1000 TABLET ORAL ONCE
Status: DISCONTINUED | OUTPATIENT
Start: 2022-10-28 | End: 2022-10-28 | Stop reason: HOSPADM

## 2022-10-28 RX ORDER — SODIUM CHLORIDE, SODIUM LACTATE, POTASSIUM CHLORIDE, CALCIUM CHLORIDE 600; 310; 30; 20 MG/100ML; MG/100ML; MG/100ML; MG/100ML
INJECTION, SOLUTION INTRAVENOUS CONTINUOUS
Status: DISCONTINUED | OUTPATIENT
Start: 2022-10-28 | End: 2022-10-28

## 2022-10-28 RX ORDER — NALOXONE HYDROCHLORIDE 0.4 MG/ML
80 INJECTION, SOLUTION INTRAMUSCULAR; INTRAVENOUS; SUBCUTANEOUS AS NEEDED
Status: DISCONTINUED | OUTPATIENT
Start: 2022-10-28 | End: 2022-10-28

## 2022-10-28 RX ORDER — ROCURONIUM BROMIDE 10 MG/ML
INJECTION, SOLUTION INTRAVENOUS AS NEEDED
Status: DISCONTINUED | OUTPATIENT
Start: 2022-10-28 | End: 2022-10-28 | Stop reason: SURG

## 2022-10-28 RX ORDER — CLINDAMYCIN PHOSPHATE 900 MG/50ML
900 INJECTION INTRAVENOUS ONCE
Status: DISCONTINUED | OUTPATIENT
Start: 2022-10-28 | End: 2022-10-28 | Stop reason: HOSPADM

## 2022-10-28 RX ORDER — LABETALOL HYDROCHLORIDE 5 MG/ML
5 INJECTION, SOLUTION INTRAVENOUS EVERY 5 MIN PRN
Status: DISCONTINUED | OUTPATIENT
Start: 2022-10-28 | End: 2022-10-28

## 2022-10-28 RX ORDER — ONDANSETRON 2 MG/ML
4 INJECTION INTRAMUSCULAR; INTRAVENOUS EVERY 6 HOURS PRN
Status: DISCONTINUED | OUTPATIENT
Start: 2022-10-28 | End: 2022-10-28

## 2022-10-28 RX ADMIN — LIDOCAINE HYDROCHLORIDE 30 MG: 10 INJECTION, SOLUTION EPIDURAL; INFILTRATION; INTRACAUDAL; PERINEURAL at 14:09:00

## 2022-10-28 RX ADMIN — ROCURONIUM BROMIDE 20 MG: 10 INJECTION, SOLUTION INTRAVENOUS at 14:09:00

## 2022-10-28 RX ADMIN — SODIUM CHLORIDE, SODIUM LACTATE, POTASSIUM CHLORIDE, CALCIUM CHLORIDE: 600; 310; 30; 20 INJECTION, SOLUTION INTRAVENOUS at 14:34:00

## 2022-10-28 RX ADMIN — SODIUM CHLORIDE, SODIUM LACTATE, POTASSIUM CHLORIDE, CALCIUM CHLORIDE: 600; 310; 30; 20 INJECTION, SOLUTION INTRAVENOUS at 14:04:00

## 2022-10-28 NOTE — DISCHARGE INSTRUCTIONS
NO VOICE REST    REGULAR DIET    Call [facility] at [phone] or if it is after hours ask to have the doctor on call paged with:    * any fresh bleeding from the nose or mouth  * A temperature greater than 102F  * Vomiting that lasts more than 24 hours  * Severe pain that gets worse and is not helped by medicine  * Coughing that will not go away  * Problems drinking fluids for more than 24 hours or in not able to urinate  * Neck pain, stiffness or has a hard time turning their head    Call with any other questions or concerns    Appointments you need to make: You should make a follow up appointment for 3 weeks after surgery. What to expect:  * Your child will have throat, ear and jaw pain  * Bad breath  * increased nasal drainage  * mild fever for a few days after surgery    Pain:  * You may have acetaminophen (Tylenol) every 4 to 6 hours or Ibuprofen every 6-8 hours as needed  * If your child has a known bleeding problem then no Ibuprofen can be given  * If you need additional pain medication, please call the nursing line at [phone] x [phone]    Diet:  * Offer plenty of fluids  * Start with clear liquids (flat white soda, water, broth, apple juice, and popsicles)  * If you  Do not have an upset stomach when fully awake from surgery, a soft diet can be started. Avoid spicy, acidic or rough foods (includes toast, crackers, and potato chips)  * If you are constipated, please use over the counter Miralax    Activity:  * Recovery takes 1-2 days.       With any concerns or questions, call and ask for the ENT on call physician

## 2022-10-28 NOTE — OR NURSING
Pt resides at [facility] in [address] lives in apartment alone but has pull cord that she can pull in bathroom if she needs help. Discussed with Anesthesia Dr. Aguilar Fuller and pt will need someone in her apartment tonight with her or Dr. Gurmeet Bender will need to admit for OBS. Once anesthesia left the room, pt decides to call [facility]\", pt has caregivers from this agency that come and assist her, currently Haley Strauss is bedside with [facility] from [facility]. This nurse called and spoke with [address] and they will have someone stay with pt overnight. Dr. Aguilar Fuller text paged and informed of this. Dr. Gurmeet Bender was informed of this and is ok with keeping pt OBS if she was unable to find someone to stay with her overnight.

## 2022-10-28 NOTE — OPERATIVE REPORT
[facility] Patient Status:  Hospital Outpatient Surgery    1933 MRN [id number]   Location [address] [place] [address] Attending Jessiac Santana MD   Hosp Day # 0 PCP Cristi Christopher MD     MSDL Injection Op Note  Pre-Op Diagnosis:  Bilateral Vocal Cord Bowing  Post-Op Diagnosis: Same  Procedure:  MSDL with Bilateral  Vocal Cord Injection Prolaryn Plus  Surgeon: Brown Cruz  Anesthesia: General  Indications for Procedure:  Corinne Peoples is a very pleasant male/female with a history of vocal cord paralysis. The above-named procedure was offered for definitive treatment. Procedure in Detail:  Patient taken to the operating room and laid supine on the operating table. After adequate IV and endotracheal anesthesia, the table was turned right laterally 90 degrees. A shoulder roll was placed and a tooth-guard was placed on the upper teeth. A large Ossof-Pilling laryngoscope was placed in the oral cavity. Examination of the base of tongue, lingual and laryngeal surfaces of the epiglottis were normal.  Examination of the pyriform sinusus and posterior cricoids region were normal.  Attention was drawn to the endolarynx. There was found to be a bilateral vocal cord bowing . Zero degree photo-endoscopy was performed. The microscope was brought into view. Approximately 0.5  cc of Prolaryn Plus was injected into the right and left vocal cord. There was found to be good medialization. LTA was applied. All instruments were removed from the oral cavity and nose and the patient was given back to anesthesia and reversed without complications. The sponge, needle and instrument counts were correct at the end of the case. There were no complications. I performed all parts of this procedure. EBL:  minimal  IVF:  100cc LR  Specimens:  None  UO: None  Condition:   To PACU stable  Michael Jeffers MD  10/28/2022  1:35 PM

## 2022-10-28 NOTE — ANESTHESIA POSTPROCEDURE EVALUATION
[facility] Patient Status:  Hospital Outpatient Surgery   Age/Gender 80year old female MRN [id number]   Location [address] [facility] Attending Glen Rodriguez MD   Hosp Day # 0 PCP Tim Dias MD       Anesthesia Post-op Note    MICROSUSPENSION DIRECT LARYNGOSCOPY WITH RIGHT AND LEFT VOCAL CORD INJECTION PROLARYN PLUS    Procedure Summary     Date: 10/28/22 Room / Location: [address] [place] [address] OR 06 / [facility] OR    Anesthesia Start: 1890 Anesthesia Stop: 3878    Procedure: [address] WITH RIGHT AND LEFT VOCAL CORD INJECTION PROLARYN PLUS (Mouth) Diagnosis: (VOCAL CORD BOWING, HOARSEMESS)    Surgeons: Glen Rodriguez MD Anesthesiologist: Teresa Aleman MD    Anesthesia Type: general ASA Status: 2          Anesthesia Type: general    Vitals Value Taken Time   /79 10/28/22 1437   Temp 97.1 10/28/22 1437   Pulse 81 10/28/22 1437   Resp 16 10/28/22 1437   SpO2 100 10/28/22 1437       Patient Location: PACU    Anesthesia Type: general    Airway Patency: extubated    Postop Pain Control: adequate    Mental Status: mildly sedated but able to meaningfully participate in the post-anesthesia evaluation    Nausea/Vomiting: none    Cardiopulmonary/Hydration status: stable euvolemic    Complications: no apparent anesthesia related complications    Postop vital signs: stable    Dental Exam: Unchanged from Preop

## 2022-10-28 NOTE — ANESTHESIA PROCEDURE NOTES
Airway  Date/Time: 10/28/2022 2:10 PM  Urgency: elective    Airway not difficult    General Information and Staff    Patient location during procedure: OR  Anesthesiologist: Catie De La Fuente MD  Performed: anesthesiologist     Indications and Patient Condition  Indications for airway management: anesthesia  Sedation level: deep  Preoxygenated: yes  Patient position: sniffing  Mask difficulty assessment: 1 - vent by mask    Final Airway Details  Final airway type: endotracheal airway      Successful airway: ETT  Cuffed: yes   Successful intubation technique: direct laryngoscopy  Endotracheal tube insertion site: oral  Blade: George  Blade size: #2  ETT size (mm): 6.0    Cormack-Lehane Classification: grade I - full view of glottis  Placement verified by: chest auscultation and capnometry   Measured from: teeth  ETT to teeth (cm): 19  Number of attempts at approach: 1

## 2022-10-28 NOTE — BRIEF OP NOTE
Pre-Operative Diagnosis: VOCAL CORD BOWING, HOARSENESS     Post-Operative Diagnosis:VOCAL CORD BOWING, HOARSENESS     Procedure Performed:   MICROSUSPENSION DIRECT LARYNGOSCOPY WITH RIGHT AND LEFT VOCAL CORD INJECTION PROLARYN PLUS    Surgeon(s) and Role:     * Tramaine Reilly MD - Primary    Assistant(s):        Surgical Findings: Bilateral Vocal Cord Bowing     Specimen: None     Estimated Blood Loss: No data recorded    Dictation Number:  NA    Luanne Allen MD  10/28/2022  1:34 PM

## 2023-02-03 ENCOUNTER — HOSPITAL ENCOUNTER (EMERGENCY)
Facility: HOSPITAL | Age: 88
Discharge: HOME | End: 2023-02-03
Attending: EMERGENCY MEDICINE
Payer: MEDICARE

## 2023-02-03 VITALS
HEART RATE: 84 BPM | DIASTOLIC BLOOD PRESSURE: 70 MMHG | OXYGEN SATURATION: 98 % | BODY MASS INDEX: 20 KG/M2 | WEIGHT: 118 LBS | RESPIRATION RATE: 18 BRPM | SYSTOLIC BLOOD PRESSURE: 136 MMHG | TEMPERATURE: 98 F

## 2023-02-03 DIAGNOSIS — R30.0 DYSURIA: Primary | ICD-10-CM

## 2023-02-03 LAB
BILIRUB UR QL: NEGATIVE
CLARITY UR: CLEAR
COLOR UR: YELLOW
GLUCOSE UR-MCNC: NEGATIVE MG/DL
HGB UR QL STRIP.AUTO: NEGATIVE
KETONES UR-MCNC: NEGATIVE MG/DL
LEUKOCYTE ESTERASE UR QL STRIP.AUTO: NEGATIVE
NITRITE UR QL STRIP.AUTO: NEGATIVE
PH UR: 7 [PH] (ref 5–8)
PROT UR-MCNC: NEGATIVE MG/DL
SP GR UR STRIP: 1.01 (ref 1–1.03)
UROBILINOGEN UR STRIP-ACNC: 0.2

## 2023-02-03 PROCEDURE — 99283 EMERGENCY DEPT VISIT LOW MDM: CPT

## 2023-02-03 PROCEDURE — 81003 URINALYSIS AUTO W/O SCOPE: CPT | Performed by: EMERGENCY MEDICINE

## 2023-02-03 NOTE — ED INITIAL ASSESSMENT (HPI)
States that she has a Hx of chronic UTIs that has not been covered per her current regime. C/O intermittent abd pain with urination.  No C/O N/V or fever

## 2023-10-08 ENCOUNTER — APPOINTMENT (OUTPATIENT)
Dept: GENERAL RADIOLOGY | Facility: HOSPITAL | Age: 88
End: 2023-10-08

## 2023-10-08 ENCOUNTER — APPOINTMENT (OUTPATIENT)
Dept: CT IMAGING | Facility: HOSPITAL | Age: 88
End: 2023-10-08
Attending: EMERGENCY MEDICINE

## 2023-10-08 ENCOUNTER — HOSPITAL ENCOUNTER (EMERGENCY)
Facility: HOSPITAL | Age: 88
Discharge: HOME | End: 2023-10-08
Attending: EMERGENCY MEDICINE

## 2023-10-08 VITALS
TEMPERATURE: 97 F | RESPIRATION RATE: 16 BRPM | HEIGHT: 63 IN | HEART RATE: 90 BPM | OXYGEN SATURATION: 97 % | BODY MASS INDEX: 20.73 KG/M2 | SYSTOLIC BLOOD PRESSURE: 176 MMHG | DIASTOLIC BLOOD PRESSURE: 88 MMHG | WEIGHT: 117 LBS

## 2023-10-08 DIAGNOSIS — S52.591A OTHER CLOSED FRACTURE OF DISTAL END OF RIGHT RADIUS, INITIAL ENCOUNTER: Primary | ICD-10-CM

## 2023-10-08 DIAGNOSIS — S09.90XA INJURY OF HEAD, INITIAL ENCOUNTER: ICD-10-CM

## 2023-10-08 PROCEDURE — 99284 EMERGENCY DEPT VISIT MOD MDM: CPT

## 2023-10-08 PROCEDURE — 12013 RPR F/E/E/N/L/M 2.6-5.0 CM: CPT

## 2023-10-08 PROCEDURE — 90471 IMMUNIZATION ADMIN: CPT

## 2023-10-08 PROCEDURE — 73110 X-RAY EXAM OF WRIST: CPT | Performed by: EMERGENCY MEDICINE

## 2023-10-08 PROCEDURE — 70450 CT HEAD/BRAIN W/O DYE: CPT | Performed by: EMERGENCY MEDICINE

## 2023-10-08 RX ORDER — ACETAMINOPHEN 500 MG
1000 TABLET ORAL ONCE
Status: COMPLETED | OUTPATIENT
Start: 2023-10-08 | End: 2023-10-08

## 2023-10-08 RX ORDER — LIDOCAINE HYDROCHLORIDE 10 MG/ML
20 INJECTION, SOLUTION EPIDURAL; INFILTRATION; INTRACAUDAL; PERINEURAL ONCE
Status: COMPLETED | OUTPATIENT
Start: 2023-10-08 | End: 2023-10-08

## 2023-10-08 NOTE — ED QUICK NOTES
.Patient cleared for discharge by ED MD. Patient verbalizes understanding of discharge instructions. Patient ambulatory upon discharge.

## 2023-10-08 NOTE — ED INITIAL ASSESSMENT (HPI)
Pt reports one of her legs buckled and she fell and hit her left side of face on the cement, pt reports some of her teeth are cracked, denies loc. Pt reports left wrist pain after attempting to catch her fall. Pt denies blood thinner use.

## 2023-11-22 ENCOUNTER — HOSPITAL ENCOUNTER (EMERGENCY)
Facility: HOSPITAL | Age: 88
Discharge: HOME | End: 2023-11-22
Attending: EMERGENCY MEDICINE
Payer: MEDICARE

## 2023-11-22 ENCOUNTER — APPOINTMENT (OUTPATIENT)
Dept: CT IMAGING | Facility: HOSPITAL | Age: 88
End: 2023-11-22
Attending: EMERGENCY MEDICINE
Payer: MEDICARE

## 2023-11-22 VITALS
OXYGEN SATURATION: 97 % | TEMPERATURE: 98 F | RESPIRATION RATE: 21 BRPM | HEART RATE: 74 BPM | DIASTOLIC BLOOD PRESSURE: 95 MMHG | HEIGHT: 63 IN | WEIGHT: 120 LBS | BODY MASS INDEX: 21.26 KG/M2 | SYSTOLIC BLOOD PRESSURE: 144 MMHG

## 2023-11-22 DIAGNOSIS — M54.17 LUMBOSACRAL RADICULOPATHY: Primary | ICD-10-CM

## 2023-11-22 PROCEDURE — 99284 EMERGENCY DEPT VISIT MOD MDM: CPT

## 2023-11-22 PROCEDURE — 72192 CT PELVIS W/O DYE: CPT | Performed by: EMERGENCY MEDICINE

## 2023-11-22 PROCEDURE — 72131 CT LUMBAR SPINE W/O DYE: CPT | Performed by: EMERGENCY MEDICINE

## 2023-11-22 RX ORDER — METHYLPREDNISOLONE 4 MG/1
TABLET ORAL
Qty: 1 EACH | Refills: 0 | Status: SHIPPED | OUTPATIENT
Start: 2023-11-22

## 2023-11-22 RX ORDER — HYDROCODONE BITARTRATE AND ACETAMINOPHEN 5; 325 MG/1; MG/1
1 TABLET ORAL ONCE
Status: COMPLETED | OUTPATIENT
Start: 2023-11-22 | End: 2023-11-22

## 2023-11-22 RX ORDER — HYDROCODONE BITARTRATE AND ACETAMINOPHEN 5; 325 MG/1; MG/1
1-2 TABLET ORAL EVERY 6 HOURS PRN
Qty: 10 TABLET | Refills: 0 | Status: SHIPPED | OUTPATIENT
Start: 2023-11-22 | End: 2023-11-27

## 2023-11-22 RX ORDER — IBUPROFEN 400 MG/1
400 TABLET ORAL ONCE
Status: COMPLETED | OUTPATIENT
Start: 2023-11-22 | End: 2023-11-22

## 2023-11-22 NOTE — DISCHARGE INSTRUCTIONS
Medrol Dosepak as directed. Ibuprofen 400 mg every 8 hours with food for 2 to 3 days. Norco as needed as directed. Take the Medrol Dosepak as directed. Follow-up with the physiatrist as planned at [address] next week. You may also follow-up with the spine surgeon. Read all instructions. Return to the ER for changes or worsening.

## 2023-11-22 NOTE — ED INITIAL ASSESSMENT (HPI)
Pt presents from [facility] with complaints of right buttock pain which radiates down right leg. Pt states pain has been chronic but has worsened of recent. Denies loss of bowel/bladder.

## 2024-01-03 ENCOUNTER — HOSPITAL ENCOUNTER (EMERGENCY)
Facility: HOSPITAL | Age: 89
Discharge: HOME | End: 2024-01-03
Attending: EMERGENCY MEDICINE
Payer: MEDICARE

## 2024-01-03 ENCOUNTER — APPOINTMENT (OUTPATIENT)
Dept: CT IMAGING | Facility: HOSPITAL | Age: 89
End: 2024-01-03
Attending: EMERGENCY MEDICINE
Payer: MEDICARE

## 2024-01-03 VITALS
DIASTOLIC BLOOD PRESSURE: 102 MMHG | OXYGEN SATURATION: 98 % | WEIGHT: 120 LBS | BODY MASS INDEX: 21.26 KG/M2 | HEIGHT: 63 IN | SYSTOLIC BLOOD PRESSURE: 156 MMHG | HEART RATE: 86 BPM | RESPIRATION RATE: 20 BRPM | TEMPERATURE: 98 F

## 2024-01-03 DIAGNOSIS — M48.061 SPINAL STENOSIS OF LUMBAR REGION, UNSPECIFIED WHETHER NEUROGENIC CLAUDICATION PRESENT: ICD-10-CM

## 2024-01-03 DIAGNOSIS — S32.050A CLOSED COMPRESSION FRACTURE OF L5 LUMBAR VERTEBRA, INITIAL ENCOUNTER (HCC): Primary | ICD-10-CM

## 2024-01-03 PROCEDURE — 72192 CT PELVIS W/O DYE: CPT | Performed by: EMERGENCY MEDICINE

## 2024-01-03 PROCEDURE — 99284 EMERGENCY DEPT VISIT MOD MDM: CPT

## 2024-01-03 PROCEDURE — 99283 EMERGENCY DEPT VISIT LOW MDM: CPT

## 2024-01-03 PROCEDURE — 72131 CT LUMBAR SPINE W/O DYE: CPT | Performed by: EMERGENCY MEDICINE

## 2024-01-03 NOTE — ED PROVIDER NOTES
Patient Seen in: [facility] Emergency Department      History     Chief Complaint   Patient presents with    Pain     Stated Complaint: FAll in OCT ever since having pelvic pain from [facility]    Subjective:   HPI    89-year-old female presents for evaluation of lower back/pelvic pain.  Patient reports since following last fall she has had continued pain to her pelvis, attributed this to a nondisplaced fracture.  Since last night has had increase in same quality of pain in bilateral legs, feels as if she has some sciatic discomfort in bilateral buttocks.  No new trauma, fever, focal weakness or numbness, incontinence, saddle anesthesia, urinary complaints.  Pain does improve after Advil and Tylenol.  Patient was concerned for nondisplaced fracture may have moved.    Objective:   Past Medical History:   Diagnosis Date    Blood disorder     Cataract     Disorder of thyroid     Factor V Leiden mutation (HCC)     High cholesterol     Osteopenia     OTHER DISEASES     pancreatitis X 2    Problems with swallowing     Recurrent urinary tract infection               Past Surgical History:   Procedure Laterality Date    CATARACT      CHOLECYSTECTOMY      because of pancreatic pseudocyst which was also removed.    COLONOSCOPY       - had upper and lower endoscopy; ; Dr Lagunas because of anemia - normal.    EXCISIONAL BIOSPY RIGHT  1973    HYSTERECTOMY      ORA/BSO in 's    IR VERTEBROPLASTY            OTHER SURGICAL HISTORY  11    cysto-dr. gerard                Social History     Socioeconomic History    Marital status:    Tobacco Use    Smoking status: Never    Smokeless tobacco: Never   Vaping Use    Vaping Use: Never used   Substance and Sexual Activity    Alcohol use: No     Alcohol/week: 0.0 standard drinks of alcohol    Drug use: No   Other Topics Concern    Caffeine Concern Yes     Comment: 1 cup of coffee daily    Exercise Yes   Social History Narrative    The patient does not use  an assistive device..      The patient does live in a home with stairs.              Review of Systems    Positive for stated complaint: FAll in OCT ever since having pelvic pain from [facility]  Other systems are as noted in HPI.  Constitutional and vital signs reviewed.      All other systems reviewed and negative except as noted above.    Physical Exam     ED Triage Vitals [01/03/24 1040]   /90   Pulse 82   Resp 18   Temp 97.8 °F (36.6 °C)   Temp src Oral   SpO2 97 %   O2 Device None (Room air)       Current:/90   Pulse 82   Temp 97.8 °F (36.6 °C) (Oral)   Resp 18   Ht 160 cm (5' 3\")   Wt 54.4 kg   SpO2 97%   BMI 21.26 kg/m²         Physical Exam  Vitals and nursing note reviewed.   Constitutional:       General: She is not in acute distress.     Appearance: She is well-developed.   HENT:      Head: Normocephalic and atraumatic.   Eyes:      Conjunctiva/sclera: Conjunctivae normal.   Cardiovascular:      Rate and Rhythm: Normal rate and regular rhythm.      Heart sounds: Normal heart sounds.   Pulmonary:      Effort: Pulmonary effort is normal. No respiratory distress.      Breath sounds: Normal breath sounds.   Abdominal:      General: Bowel sounds are normal. There is no distension.      Palpations: Abdomen is soft.      Tenderness: There is no abdominal tenderness. There is no guarding or rebound.   Musculoskeletal:         General: Normal range of motion.      Cervical back: Normal range of motion and neck supple.   Skin:     General: Skin is warm and dry.      Findings: No rash.   Neurological:      General: No focal deficit present.      Mental Status: She is alert and oriented to person, place, and time.      Cranial Nerves: No cranial nerve deficit.      Sensory: No sensory deficit.      Motor: No weakness.               ED Course   Labs Reviewed - No data to display  CT SPINE LUMBAR (CPT=72131)    Result Date: 1/3/2024  CONCLUSION:  1. No acute fracture or subluxation.  Osteoporosis  2. Chronic L5, L2 and T12 vertebral compression fractures.  Post kyphoplasty at T12 and L2. 3. Multilevel disc and facet degeneration. 4. L4-L5:  Moderate to severe right lateral narrowing and asymmetric central narrowing. 5. L3-4:  Moderate central narrowing. 6. L5-S1:  Moderate bilateral foraminal narrowing.  Mild central and lateral narrowing.     Dictated by (CST): Vincent Pena MD on 1/03/2024 at 3:05 PM     Finalized by (CST): Vincent Pena MD on 1/03/2024 at 3:12 PM          CT PELVIS (CPT=72192)    Result Date: 1/3/2024  CONCLUSION:  1. No acute fracture or suspicious bone lesion. 2. Moderate chronic L5 vertebral compression fracture.  Osteoporosis. 3. Moderate right greater than left hip osteoarthritis.    Dictated by (CST): Vincent Pena MD on 1/03/2024 at 2:59 PM     Finalized by (CST): Vincent Pena MD on 1/03/2024 at 3:05 PM                  [facility]        Medical Decision Making  Differential diagnosis includes but is not limited to osteoarthritis, fracture, musculoskeletal strain, malignancy, radiculopathy    Well-appearing patient with normal neurovascular exam.  Patient declines pain medication in the emergency department.  Imaging as above, discussed results with patient and daughter at the bedside, advised outpatient follow-up and discussed return precautions.  Patient and daughter verbalized understanding of and agreement with this plan.    Amount and/or Complexity of Data Reviewed  External Data Reviewed: radiology.     Details: CT scans from November 2023 reviewed  Radiology: ordered and independent interpretation performed.    Risk  OTC drugs.        Disposition and Plan     Clinical Impression:  1. Closed compression fracture of L5 lumbar vertebra, initial encounter ([facility])    2. Spinal stenosis of lumbar region, unspecified whether neurogenic claudication present         Disposition:  Discharge  1/3/2024  3:51 pm    Follow-up:  Dianna Londono MD   [address]  [address]  [phone]    Follow up  As needed    Nadir Linda MD  [address] [facility]  [address]  [facility] [id number] [phone]    Follow up      KIKA Lloyd MD  [address]  [address]  [facility] [id number] [phone]    Follow up      We recommend that you schedule follow up care with a primary care provider within the next three months to obtain basic health screening including reassessment of your blood pressure.      Medications Prescribed:  Current Discharge Medication List

## 2024-01-03 NOTE — ED QUICK NOTES
Patient cleared for discharge by provider. Belongings with patient. Discharge instructions provided including when and how to follow up with health care provider and when to seek medical treatment. Medication use and prescriptions reviewed. Patient left ER in stable condition.

## 2024-01-03 NOTE — ED INITIAL ASSESSMENT (HPI)
Via [facility] from [facility]. Patient fell about 3 months ago. Pain worse today, complains of worsening pain to bilateral lower back radiating down legs. Was diagnosed with sciatica. Was given an injection without relief.

## 2024-01-03 NOTE — DISCHARGE INSTRUCTIONS
Continue taking ibuprofen and Tylenol as needed for pain.    See your primary care doctor for follow-up.    Return to the ER if you develop worsening symptoms or emergent concerns.

## 2024-01-17 ENCOUNTER — APPOINTMENT (OUTPATIENT)
Dept: GENERAL RADIOLOGY | Facility: HOSPITAL | Age: 89
End: 2024-01-17
Attending: EMERGENCY MEDICINE
Payer: MEDICARE

## 2024-01-17 ENCOUNTER — APPOINTMENT (OUTPATIENT)
Dept: CT IMAGING | Facility: HOSPITAL | Age: 89
End: 2024-01-17
Attending: EMERGENCY MEDICINE
Payer: MEDICARE

## 2024-01-17 ENCOUNTER — APPOINTMENT (OUTPATIENT)
Dept: MRI IMAGING | Facility: HOSPITAL | Age: 89
End: 2024-01-17
Attending: HOSPITALIST
Payer: MEDICARE

## 2024-01-17 ENCOUNTER — HOSPITAL ENCOUNTER (INPATIENT)
Facility: HOSPITAL | Age: 89
LOS: 2 days | Discharge: HOME | End: 2024-01-19
Attending: EMERGENCY MEDICINE | Admitting: HOSPITALIST
Payer: MEDICARE

## 2024-01-17 DIAGNOSIS — M48.061 LUMBAR FORAMINAL STENOSIS: ICD-10-CM

## 2024-01-17 DIAGNOSIS — R11.2 NAUSEA AND VOMITING, UNSPECIFIED VOMITING TYPE: ICD-10-CM

## 2024-01-17 DIAGNOSIS — I48.91 ATRIAL FIBRILLATION WITH RAPID VENTRICULAR RESPONSE (HCC): ICD-10-CM

## 2024-01-17 DIAGNOSIS — M54.30 SCIATICA, UNSPECIFIED LATERALITY: ICD-10-CM

## 2024-01-17 DIAGNOSIS — S32.010K COMPRESSION FRACTURE OF L1 VERTEBRA WITH NONUNION, SUBSEQUENT ENCOUNTER: Primary | ICD-10-CM

## 2024-01-17 DIAGNOSIS — E87.6 HYPOKALEMIA: ICD-10-CM

## 2024-01-17 PROBLEM — R79.89 AZOTEMIA: Status: ACTIVE | Noted: 2024-01-17

## 2024-01-17 LAB
ALBUMIN SERPL-MCNC: 4.2 G/DL (ref 3.2–4.8)
ALBUMIN/GLOB SERPL: 1.9 {RATIO} (ref 1–2)
ALP LIVER SERPL-CCNC: 38 U/L
ALT SERPL-CCNC: 27 U/L
ANION GAP SERPL CALC-SCNC: 7 MMOL/L (ref 0–18)
AST SERPL-CCNC: 20 U/L (ref ?–34)
ATRIAL RATE: 125 BPM
ATRIAL RATE: 99 BPM
BASOPHILS # BLD AUTO: 0.05 X10(3) UL (ref 0–0.2)
BASOPHILS NFR BLD AUTO: 0.7 %
BILIRUB SERPL-MCNC: 0.8 MG/DL (ref 0.2–0.9)
BILIRUB UR QL: NEGATIVE
BUN BLD-MCNC: 19 MG/DL (ref 9–23)
BUN/CREAT SERPL: 28.8 (ref 10–20)
CALCIUM BLD-MCNC: 8.9 MG/DL (ref 8.7–10.4)
CHLORIDE SERPL-SCNC: 107 MMOL/L (ref 98–112)
CK SERPL-CCNC: 81 U/L
CLARITY UR: CLEAR
CO2 SERPL-SCNC: 22 MMOL/L (ref 21–32)
CREAT BLD-MCNC: 0.66 MG/DL
D DIMER PPP FEU-MCNC: 1.34 UG/ML FEU (ref ?–0.9)
DEPRECATED RDW RBC AUTO: 47.9 FL (ref 35.1–46.3)
EGFRCR SERPLBLD CKD-EPI 2021: 83 ML/MIN/1.73M2 (ref 60–?)
EOSINOPHIL # BLD AUTO: 0.15 X10(3) UL (ref 0–0.7)
EOSINOPHIL NFR BLD AUTO: 2.1 %
ERYTHROCYTE [DISTWIDTH] IN BLOOD BY AUTOMATED COUNT: 13.9 % (ref 11–15)
GLOBULIN PLAS-MCNC: 2.2 G/DL (ref 2.8–4.4)
GLUCOSE BLD-MCNC: 155 MG/DL (ref 70–99)
GLUCOSE BLDC GLUCOMTR-MCNC: 151 MG/DL (ref 70–99)
GLUCOSE UR-MCNC: NORMAL MG/DL
HCT VFR BLD AUTO: 40.4 %
HGB BLD-MCNC: 12.9 G/DL
HGB UR QL STRIP.AUTO: NEGATIVE
IMM GRANULOCYTES # BLD AUTO: 0.14 X10(3) UL (ref 0–1)
IMM GRANULOCYTES NFR BLD: 1.9 %
KETONES UR-MCNC: NEGATIVE MG/DL
LEUKOCYTE ESTERASE UR QL STRIP.AUTO: NEGATIVE
LYMPHOCYTES # BLD AUTO: 3.24 X10(3) UL (ref 1–4)
LYMPHOCYTES NFR BLD AUTO: 44.6 %
MAGNESIUM SERPL-MCNC: 2 MG/DL (ref 1.6–2.6)
MCH RBC QN AUTO: 29.7 PG (ref 26–34)
MCHC RBC AUTO-ENTMCNC: 31.9 G/DL (ref 31–37)
MCV RBC AUTO: 93.1 FL
MONOCYTES # BLD AUTO: 0.78 X10(3) UL (ref 0.1–1)
MONOCYTES NFR BLD AUTO: 10.7 %
NEUTROPHILS # BLD AUTO: 2.91 X10 (3) UL (ref 1.5–7.7)
NEUTROPHILS # BLD AUTO: 2.91 X10(3) UL (ref 1.5–7.7)
NEUTROPHILS NFR BLD AUTO: 40 %
NITRITE UR QL STRIP.AUTO: NEGATIVE
OSMOLALITY SERPL CALC.SUM OF ELEC: 287 MOSM/KG (ref 275–295)
P AXIS: 77 DEGREES
P-R INTERVAL: 174 MS
PH UR: 6 [PH] (ref 5–8)
PLATELET # BLD AUTO: 197 10(3)UL (ref 150–450)
POTASSIUM SERPL-SCNC: 3.3 MMOL/L (ref 3.5–5.1)
PROT SERPL-MCNC: 6.4 G/DL (ref 5.7–8.2)
PROT UR-MCNC: NEGATIVE MG/DL
Q-T INTERVAL: 300 MS
Q-T INTERVAL: 334 MS
Q-T INTERVAL: 368 MS
QRS DURATION: 86 MS
QRS DURATION: 86 MS
QRS DURATION: 90 MS
QTC CALCULATION (BEZET): 434 MS
QTC CALCULATION (BEZET): 468 MS
QTC CALCULATION (BEZET): 472 MS
R AXIS: -27 DEGREES
R AXIS: -29 DEGREES
R AXIS: -40 DEGREES
RBC # BLD AUTO: 4.34 X10(6)UL
SODIUM SERPL-SCNC: 136 MMOL/L (ref 136–145)
SP GR UR STRIP: 1.01 (ref 1–1.03)
T AXIS: 28 DEGREES
T AXIS: 67 DEGREES
T AXIS: 70 DEGREES
TROPONIN I SERPL HS-MCNC: 3 NG/L
TSI SER-ACNC: 1.86 MIU/ML (ref 0.55–4.78)
UROBILINOGEN UR STRIP-ACNC: NORMAL
VENTRICULAR RATE: 118 BPM
VENTRICULAR RATE: 126 BPM
VENTRICULAR RATE: 99 BPM
WBC # BLD AUTO: 7.3 X10(3) UL (ref 4–11)

## 2024-01-17 PROCEDURE — 70450 CT HEAD/BRAIN W/O DYE: CPT | Performed by: EMERGENCY MEDICINE

## 2024-01-17 PROCEDURE — 72148 MRI LUMBAR SPINE W/O DYE: CPT | Performed by: HOSPITALIST

## 2024-01-17 PROCEDURE — 74177 CT ABD & PELVIS W/CONTRAST: CPT | Performed by: EMERGENCY MEDICINE

## 2024-01-17 PROCEDURE — 71260 CT THORAX DX C+: CPT | Performed by: EMERGENCY MEDICINE

## 2024-01-17 PROCEDURE — 99223 1ST HOSP IP/OBS HIGH 75: CPT | Performed by: HOSPITALIST

## 2024-01-17 PROCEDURE — 71045 X-RAY EXAM CHEST 1 VIEW: CPT | Performed by: EMERGENCY MEDICINE

## 2024-01-17 PROCEDURE — 72125 CT NECK SPINE W/O DYE: CPT | Performed by: EMERGENCY MEDICINE

## 2024-01-17 RX ORDER — HYDROMORPHONE HYDROCHLORIDE 1 MG/ML
0.4 INJECTION, SOLUTION INTRAMUSCULAR; INTRAVENOUS; SUBCUTANEOUS EVERY 2 HOUR PRN
Status: DISCONTINUED | OUTPATIENT
Start: 2024-01-17 | End: 2024-01-19

## 2024-01-17 RX ORDER — METOCLOPRAMIDE HYDROCHLORIDE 5 MG/ML
10 INJECTION INTRAMUSCULAR; INTRAVENOUS ONCE
Status: COMPLETED | OUTPATIENT
Start: 2024-01-17 | End: 2024-01-17

## 2024-01-17 RX ORDER — NITROFURANTOIN 25; 75 MG/1; MG/1
100 CAPSULE ORAL DAILY
Status: DISCONTINUED | OUTPATIENT
Start: 2024-01-17 | End: 2024-01-19

## 2024-01-17 RX ORDER — HYDROCODONE BITARTRATE AND ACETAMINOPHEN 5; 325 MG/1; MG/1
1 TABLET ORAL EVERY 6 HOURS PRN
Status: DISCONTINUED | OUTPATIENT
Start: 2024-01-17 | End: 2024-01-19

## 2024-01-17 RX ORDER — DIPHENHYDRAMINE HYDROCHLORIDE 50 MG/ML
25 INJECTION INTRAMUSCULAR; INTRAVENOUS ONCE
Status: COMPLETED | OUTPATIENT
Start: 2024-01-17 | End: 2024-01-17

## 2024-01-17 RX ORDER — ONDANSETRON 2 MG/ML
INJECTION INTRAMUSCULAR; INTRAVENOUS
Status: COMPLETED
Start: 2024-01-17 | End: 2024-01-17

## 2024-01-17 RX ORDER — HYDROMORPHONE HYDROCHLORIDE 1 MG/ML
0.2 INJECTION, SOLUTION INTRAMUSCULAR; INTRAVENOUS; SUBCUTANEOUS EVERY 2 HOUR PRN
Status: DISCONTINUED | OUTPATIENT
Start: 2024-01-17 | End: 2024-01-19

## 2024-01-17 RX ORDER — HYDROMORPHONE HYDROCHLORIDE 1 MG/ML
0.8 INJECTION, SOLUTION INTRAMUSCULAR; INTRAVENOUS; SUBCUTANEOUS EVERY 2 HOUR PRN
Status: DISCONTINUED | OUTPATIENT
Start: 2024-01-17 | End: 2024-01-19

## 2024-01-17 RX ORDER — METOCLOPRAMIDE HYDROCHLORIDE 5 MG/ML
10 INJECTION INTRAMUSCULAR; INTRAVENOUS EVERY 8 HOURS PRN
Status: DISCONTINUED | OUTPATIENT
Start: 2024-01-17 | End: 2024-01-19

## 2024-01-17 RX ORDER — LEVOTHYROXINE SODIUM 88 UG/1
88 TABLET ORAL
Status: DISCONTINUED | OUTPATIENT
Start: 2024-01-18 | End: 2024-01-19

## 2024-01-17 RX ORDER — NITROFURANTOIN 25; 75 MG/1; MG/1
100 CAPSULE ORAL DAILY
COMMUNITY
Start: 2023-11-02

## 2024-01-17 RX ORDER — FAMOTIDINE 20 MG/1
20 TABLET, FILM COATED ORAL DAILY
Status: DISCONTINUED | OUTPATIENT
Start: 2024-01-17 | End: 2024-01-19

## 2024-01-17 RX ORDER — METHYLPREDNISOLONE SODIUM SUCCINATE 40 MG/ML
40 INJECTION, POWDER, LYOPHILIZED, FOR SOLUTION INTRAMUSCULAR; INTRAVENOUS EVERY 12 HOURS
Status: DISCONTINUED | OUTPATIENT
Start: 2024-01-17 | End: 2024-01-19

## 2024-01-17 RX ORDER — SODIUM CHLORIDE 9 MG/ML
INJECTION, SOLUTION INTRAVENOUS CONTINUOUS
Status: DISCONTINUED | OUTPATIENT
Start: 2024-01-17 | End: 2024-01-19

## 2024-01-17 RX ORDER — ACETAMINOPHEN 500 MG
500 TABLET ORAL EVERY 4 HOURS PRN
Status: DISCONTINUED | OUTPATIENT
Start: 2024-01-17 | End: 2024-01-19

## 2024-01-17 RX ORDER — HYDROMORPHONE HYDROCHLORIDE 1 MG/ML
0.2 INJECTION, SOLUTION INTRAMUSCULAR; INTRAVENOUS; SUBCUTANEOUS ONCE
Status: COMPLETED | OUTPATIENT
Start: 2024-01-17 | End: 2024-01-17

## 2024-01-17 RX ORDER — DILTIAZEM HYDROCHLORIDE 5 MG/ML
10 INJECTION INTRAVENOUS ONCE
Status: COMPLETED | OUTPATIENT
Start: 2024-01-17 | End: 2024-01-17

## 2024-01-17 RX ORDER — ONDANSETRON 2 MG/ML
4 INJECTION INTRAMUSCULAR; INTRAVENOUS EVERY 6 HOURS PRN
Status: DISCONTINUED | OUTPATIENT
Start: 2024-01-17 | End: 2024-01-19

## 2024-01-17 RX ORDER — FAMOTIDINE 20 MG/1
20 TABLET, FILM COATED ORAL 2 TIMES DAILY
Status: DISCONTINUED | OUTPATIENT
Start: 2024-01-17 | End: 2024-01-17

## 2024-01-17 RX ORDER — FAMOTIDINE 10 MG/ML
20 INJECTION, SOLUTION INTRAVENOUS ONCE
Status: COMPLETED | OUTPATIENT
Start: 2024-01-17 | End: 2024-01-17

## 2024-01-17 NOTE — PROGRESS NOTES
[facility] - CARDIOLOGY CONSULT NOTE    Christina Giraldo Patient Status:  Emergency    1933 MRN [id number]   Location [facility] EMERGENCY DEPARTMENT Attending Jude Paniagua MD   Hosp Day # 0 PCP Marce Smith MD     Date of Admission:  2024  Date of Consult:  2024  I was asked by Jude Paniagua MD to provide recommendations for evaluation and management of cardiac issues.  Impression:     Atrial fibrillation with rapid ventricular response (HCC)  Patient presented with episode of paroxysmal A-fib that is not likely contributing to patient's presentation.  At this time is unclear if this is an isolated event or a recent current asymptomatic event.  To further assess will monitor on telemetry.  Would benefit from 14-day MCT monitor at discharge.  Will correct potassium levels and monitor.  Would benefit from echo which is ordered.  CT negative for PE.    Hypertension  May be associated with pain.  No treatment at this time.  If pressure remains high can add low-dose beta-blocker      Hypokalemia  Correct potassium levels and will monitor      Azotemia  Cautiously hydrate    Back pain Per primary    Dizziness with questionable syncope  Suspect episode associated with decreased oral intake and pain meds        Recommendations:  , As above discussed with patient daughter and hospitalist    Thank you for allowing me to participate in the care of your patient.    Marty Nagel MD  [facility] [place] [facility]  2024    Reason for Consultation:   Paroxysmal atrial fibrillation    History of Present Illness:   Patient is a 89 year old female who was admitted to the hospital for Atrial fibrillation with rapid ventricular response (HCC):  This is a pleasant 89-year-old with back problems who presents to the hospital after she was found on the floor and slightly confused.  Patient states she been having a lot of back pain and took tramadol last night and  this morning became dizzy.  She does not think she ever passed out.  She had no chest pain or shortness of breath.  Patient states she may have had A-fib 30 years ago.  First EKG in the hospital showed A-fib but subsequent EKGs and rhythm is sinus.  Patient still in pain and cold with elevated blood pressures.  She did receive 1 dose of IV Cardizem in the ER.  Patient did not eat much today.  Subsequent EKG showed no significant abnormalities.  Cardiac tests:    Past Medical History  Past Medical History:   Diagnosis Date    Blood disorder     Cataract     Disorder of thyroid     Factor V Leiden mutation (HCC)     High cholesterol     Osteopenia     OTHER DISEASES     pancreatitis X 2    Problems with swallowing     Recurrent urinary tract infection        Past Surgical History  Past Surgical History:   Procedure Laterality Date    CATARACT      CHOLECYSTECTOMY      because of pancreatic pseudocyst which was also removed.    COLONOSCOPY       - had upper and lower endoscopy; ; Dr Lagunas because of anemia - normal.    EXCISIONAL BIOSPY RIGHT  1973    HYSTERECTOMY      ORA/BSO in 's    IR VERTEBROPLASTY            OTHER SURGICAL HISTORY  11    cysto-dr. gerard       Family History  Family History   Problem Relation Age of Onset    Other (Other) Father         cva    Cancer Mother 80        breast cancer    Breast Cancer Mother 80        80    Cancer Other         breast    Breast Cancer Maternal Aunt 82        82       Social History  Pediatric History   Patient Parents    Not on file     Other Topics Concern     Service Not Asked    Blood Transfusions Not Asked    Caffeine Concern Yes     Comment: 1 cup of coffee daily    Occupational Exposure Not Asked    Hobby Hazards Not Asked    Sleep Concern Not Asked    Stress Concern Not Asked    Weight Concern Not Asked    Special Diet Not Asked    Back Care Not Asked    Exercise Yes    Bike Helmet Not Asked    Seat Belt Not Asked    Self-Exams Not  Asked   Social History Narrative    The patient does not use an assistive device..      The patient does live in a home with stairs.           Current Medications:  Current Facility-Administered Medications   Medication Dose Route Frequency    sodium chloride 0.9% infusion   Intravenous Continuous    potassium chloride 40 mEq in 250mL sodium chloride 0.9% IVPB premix  40 mEq Intravenous Once     (Not in a hospital admission)      Allergies  Allergies   Allergen Reactions    Cephalexin OTHER (SEE COMMENTS)     Pt does not remember.     Codeine [Opioid Analgesics] NAUSEA AND VOMITING    Etodolac OTHER (SEE COMMENTS)    Morphine NAUSEA AND VOMITING    Terbinafine OTHER (SEE COMMENTS)     Medication was not working.    Tramadol NAUSEA AND VOMITING    Ciprofloxacin DIZZINESS    Lamisil DIARRHEA    Omeprazole NAUSEA AND VOMITING       Review of Systems:   10 pt ROS performed, separate from HPI  Review of Systems:  GENERAL: no fevers, chills, sweats  HEENT: no visual or hearing changes  SKIN: denies any unusual skin lesions or rashes  RESPIRATORY: denies shortness of breath with exertion  CARDIOVASCULAR: See HPI  GI: denies abdominal pain and denies heartburn  : no dysuria or hematuria  NEURO: denies headaches, focal weaknesses or paresthesias  All other systems were reviewed and negative.  Physical Exam:   Blood pressure (!) 146/108, pulse 99, temperature 97.3 °F (36.3 °C), temperature source Oral, resp. rate 14, height 5' 3\" (1.6 m), weight 120 lb (54.4 kg), SpO2 98%, not currently breastfeeding.  Intake/Output:           Last 24 hours:   Intake/Output Summary (Last 24 hours) at 1/17/2024 1548  Last data filed at 1/17/2024 1218  Gross per 24 hour   Intake 1000 ml   Output --   Net 1000 ml      This shift: I/O this shift:  In: 1000 [I.V.:500; IV PIGGYBACK:500]  Out: -     Scheduled Meds:    potassium chloride in sodium chloride 0.9%  40 mEq Intravenous Once         Physical Exam:    General: Cold and uncomfortable with  some back pain, well-nourished, in no acute distress.  HEENT: Atraumatic.  No scleral icterus.  Mucous membranes are moist.  Oropharynx is clear.  Neck: supple,No JVD, carotids 2+, no bruits  Cardiac: Regular rate and rhythm.S1,S2 normal, No pathologic murmur.  Lungs: Clear with normal effort.  Normal excursions and effort.  Abdomen: Soft, non-tender. BS-present.  Extremities: Without clubbing, cyanosis.or edema.  Peripheral pulses present.  Neurologic: Alert and oriented x 3, normal affect. No dysarthria or gross motor deficits.  Psychiatric: Coooperative, calm. Alert and oriented x 3  Skin: Warm and dry. No rash    Results:     Laboratory Data:  Lab Results   Component Value Date    WBC 7.3 01/17/2024    HGB 12.9 01/17/2024    HCT 40.4 01/17/2024    .0 01/17/2024    CREATSERUM 0.66 01/17/2024    BUN 19 01/17/2024     01/17/2024    K 3.3 (L) 01/17/2024     01/17/2024    CO2 22.0 01/17/2024     (H) 01/17/2024    CA 8.9 01/17/2024    ALB 4.2 01/17/2024    ALKPHO 38 (L) 01/17/2024    TP 6.4 01/17/2024    AST 20 01/17/2024    ALT 27 01/17/2024    PTT 22.4 (L) 01/06/2022    INR 0.9 01/06/2022    T4F 1.49 11/16/2021    TSH 1.858 01/17/2024    DDIMER 1.34 (H) 01/17/2024    ESRML 12 12/16/2020    CRP 0.64 (H) 12/16/2020    MG 2.0 01/17/2024    CK 81 01/17/2024    B12 766 07/17/2017         Imaging:          CT ABDOMEN PELVIS IV CONTRAST, NO ORAL (ER)    Result Date: 1/17/2024  CONCLUSION:   No evidence of acute abnormality in the abdomen or pelvis.  Cholecystectomy.  Mild biliary ductal prominence is nonspecific but may relate to prior cholecystectomy.  Advise biliary laboratory correlation and consider follow-up MRCP as clinically warranted.  Colonic diverticulosis.  Hysterectomy.  Lesser incidental findings as above.    Dictated by (CST): Jose Maria Murray MD on 1/17/2024 at 2:14 PM     Finalized by (CST): Jose Maria Murray MD on 1/17/2024 at 2:23 PM          CT CHEST PE AORTA (IV ONLY)  (CPT=71260)    Result Date: 1/17/2024  CONCLUSION: No PE    Dictated by (CST): Gabino Putnam MD on 1/17/2024 at 2:08 PM     Finalized by (CST): Gabino Putnam MD on 1/17/2024 at 2:21 PM          CT SPINE CERVICAL (CPT=72125)    Result Date: 1/17/2024  CONCLUSION: No fracture    Dictated by (CST): Gabino Putnam MD on 1/17/2024 at 2:06 PM     Finalized by (CST): Gabino Putnam MD on 1/17/2024 at 2:08 PM          CT BRAIN OR HEAD (38262)    Result Date: 1/17/2024  CONCLUSION: No traumatic finding    Dictated by (CST): Gabino Putnam MD on 1/17/2024 at 2:03 PM     Finalized by (CST): Gabino Putnam MD on 1/17/2024 at 2:05 PM          XR CHEST AP PORTABLE  (CPT=71045)    Result Date: 1/17/2024  CONCLUSION:  1. No acute airspace disease.  Suboptimal inspiration.  Mild bibasilar scarring/atelectasis.    Dictated by (CST): Lion Bowser MD on 1/17/2024 at 12:01 PM     Finalized by (CST): Lion Bowser MD on 1/17/2024 at 12:03 PM

## 2024-01-17 NOTE — CM/SW NOTE
As requested, spoke with Patriica mendoza at [facility] updated her on patient's pending admission.

## 2024-01-17 NOTE — H&P
[facility]    PATIENT'S NAME: LAZARA SINGLETON   ATTENDING PHYSICIAN: Jude Paniagua MD   PATIENT ACCOUNT#:   [id number]    LOCATION:  [address]  MEDICAL RECORD #:   [id number]       YOB: 1933  ADMISSION DATE:       01/17/2024    HISTORY AND PHYSICAL EXAMINATION    CHIEF COMPLAINT:  Intractable lumbar radiculopathy and atrial fibrillation with rapid ventricular response.    HISTORY OF PRESENT ILLNESS:  The patient is a 89-year-old  female with chronic back pain, underlying degenerative joint disease of lumbar spine, who presented today to the emergency department for evaluation after she had intractable nausea, vomiting, after she took 2 doses of tramadol at home prescribed by her primary care physician for her back pain.  Upon arrival to the emergency room, she was in atrial fibrillation, rapid ventricular response, 130 to 150, which is new for her.  Pulse ox also was 89% on room air.  She was given IV Cardizem and IV fluids, and she converted to sinus rhythm.  Chemistry showed potassium 3.3.  Otherwise, chemistry, CBC, magnesium, TSH, troponin, and CK were negative.  Urinalysis showed no evidence of urinary tract infection.  CT scan of the abdomen and pelvis, CT scan of the chest, and CT scan of the cervical spine and brain were negative.  Chest x-ray showed no acute findings.    PAST MEDICAL HISTORY:  Hypothyroidism, hypertension, hyperlipidemia, osteoarthritis, osteoporosis, degenerative joint disease of lumbar spine, and lumbar radiculopathy.    PAST SURGICAL HISTORY:  Cataract procedure, cholecystectomy, right breast biopsy, hysterectomy, T12 and L2 kyphoplasties, cystoscopy.    ALLERGIES:  No known drug allergies.  She had multiple side effects including cephalexin, codeine, morphine, terbinafine, tramadol, ciprofloxacin, Lamisil, omeprazole.  All nausea and vomiting.    FAMILY HISTORY:  Mother had breast cancer.  Father had cerebrovascular  accident.    SOCIAL HISTORY:  No tobacco, alcohol, or drug use.  Lives with her family.  Independent for basic activities of daily living.     REVIEW OF SYSTEMS:  Patient said she had chronic and lately progressive back pain radiating from her bilateral gluteal areas to the posterior thighs and lateral legs.  Patient denies any urine incontinence.  No recent trauma.  She was started on tramadol and took 1 dose last night, 1 dose this morning.  Started having intractable nausea and vomiting and felt terrible and decided to come into the emergency department for evaluation.  Other 12-point review of systems is negative.      PHYSICAL EXAMINATION:    GENERAL:  Alert and oriented to time, place, and person.  Tremulous, moderate distress secondary to her back pain.  VITAL SIGNS:  Temperature 97.3, pulse 99, respiratory rate 21, blood pressure 154/96, pulse ox 98% on room air.  After her heart rate rhythm converted to sinus rhythm, blood pressure is 90/53.  HEENT:  Atraumatic.  Oropharynx clear.  Dry mucous membranes.  Normal hard and soft palate.  Eyes:  Anicteric sclerae.   NECK:  Supple.  No lymphadenopathy.  Trachea midline.  Full range of motion.  LUNGS:  Clear to auscultation bilaterally.  Normal respiratory effort.    HEART:  Regular rate, rhythm.  S1 and S2 auscultated.  No murmur.   ABDOMEN:  Soft, nondistended.  No tenderness.  Positive bowel sounds.    EXTREMITIES:  No edema, clubbing, or cyanosis.  NEUROLOGIC:  Motor and sensory intact.  Did not perform straight leg raising maneuver because of her intractable pain.    ASSESSMENT:    1.   Intractable lumbar radiculopathy.  2.   Intractable nausea and vomiting secondary to tramadol use.  3.   Atrial fibrillation with rapid ventricular response, converted to sinus rhythm after IV Cardizem.    PLAN:  Patient will be admitted to general medical floor.  Replace her electrolytes.  Place her on remote telemetry.  Obtain 2D echocardiogram with Doppler and MRI scan of  the lumbar spine.  Will use IV Dilaudid in small incremental doses.  Obtain pain service consult and cardiology consult.  Fall precautions.  Physical therapy.  Further recommendations to follow.     Dictated By Nicci Good MD  d: 01/17/2024 15:04:22  t: 01/17/2024 16:19:14  Job [id number]  FB/    cc: Jude Paniagua MD

## 2024-01-17 NOTE — ED QUICK NOTES
Nausea improved per patient. No further vomiting after IVP reglan and benadryl. Vss on 2L per NC. Will continue to monitor.

## 2024-01-17 NOTE — PLAN OF CARE
A&OX4, admission complete. Medicated for pain. Md notified of home meds  Problem: Patient Centered Care  Goal: Patient preferences are identified and integrated in the patient's plan of care  Description: Interventions:  - What would you like us to know as we care for you? I live at [facility]   - Provide timely, complete, and accurate information to patient/family  - Incorporate patient and family knowledge, values, beliefs, and cultural backgrounds into the planning and delivery of care  - Encourage patient/family to participate in care and decision-making at the level they choose  - Honor patient and family perspectives and choices  Outcome: Not Progressing     Problem: Patient/Family Goals  Goal: Patient/Family Long Term Goal  Description: Patient's Long Term Goal: decrease pain     Interventions:  - take meds as directed   - See additional Care Plan goals for specific interventions  Outcome: Not Progressing  Goal: Patient/Family Short Term Goal  Description: Patient's Short Term Goal: go home     Interventions:   - complete test and follow plan of care   - See additional Care Plan goals for specific interventions  Outcome: Not Progressing     Problem: PAIN - ADULT  Goal: Verbalizes/displays adequate comfort level or patient's stated pain goal  Description: INTERVENTIONS:  - Encourage pt to monitor pain and request assistance  - Assess pain using appropriate pain scale  - Administer analgesics based on type and severity of pain and evaluate response  - Implement non-pharmacological measures as appropriate and evaluate response  - Consider cultural and social influences on pain and pain management  - Manage/alleviate anxiety  - Utilize distraction and/or relaxation techniques  - Monitor for opioid side effects  - Notify MD/LIP if interventions unsuccessful or patient reports new pain  - Anticipate increased pain with activity and pre-medicate as appropriate  Outcome: Not Progressing     Problem: CARDIOVASCULAR -  ADULT  Goal: Maintains optimal cardiac output and hemodynamic stability  Description: INTERVENTIONS:  - Monitor vital signs, rhythm, and trends  - Monitor for bleeding, hypotension and signs of decreased cardiac output  - Evaluate effectiveness of vasoactive medications to optimize hemodynamic stability  - Monitor arterial and/or venous puncture sites for bleeding and/or hematoma  - Assess quality of pulses, skin color and temperature  - Assess for signs of decreased coronary artery perfusion - ex. Angina  - Evaluate fluid balance, assess for edema, trend weights  Outcome: Not Progressing  Goal: Absence of cardiac arrhythmias or at baseline  Description: INTERVENTIONS:  - Continuous cardiac monitoring, monitor vital signs, obtain 12 lead EKG if indicated  - Evaluate effectiveness of antiarrhythmic and heart rate control medications as ordered  - Initiate emergency measures for life threatening arrhythmias  - Monitor electrolytes and administer replacement therapy as ordered  Outcome: Not Progressing

## 2024-01-17 NOTE — ED INITIAL ASSESSMENT (HPI)
Patient arrives via EMS from assisted living. Patient was found on floor and appeared confused. +nausea. C-collar placed per EMS. Last known well 0830 this morning. John rg/katy. A&Ox4 in ED at this time. . Zofran 4mg IVP given per EMS.

## 2024-01-17 NOTE — ED QUICK NOTES
Patient stable for transfer to floor at this time. A&Ox4, skin p/w/d. Denies cp/katy. Vss on room air. K+ infusing through patent IV without complication.

## 2024-01-17 NOTE — ED PROVIDER NOTES
Patient Seen in: [facility] Emergency Department      History     Chief Complaint   Patient presents with    Fall    Nausea/vomiting    Altered Mental Status     Stated Complaint:     Subjective:   89-year-old female with hyperlipidemia thyroid disease here because she was found on the ground.  She is alert and oriented and tells me she took tramadol for the first time last night for her sciatica pain.  She feels very weak and nauseous and had to set herself down on the ground.  She did not sustain significant trauma.  She feels very nauseated and vomited as I was talking to her.  It was whitish-yellow.  She said she did not hit her head.  No LOC.  No headache or neck pain.  No weakness or numbness.  No chest pain or shortness of breath.            Objective:   Past Medical History:   Diagnosis Date    Blood disorder     Cataract     Disorder of thyroid     Factor V Leiden mutation (HCC)     High cholesterol     Osteopenia     OTHER DISEASES     pancreatitis X 2    Problems with swallowing     Recurrent urinary tract infection               Past Surgical History:   Procedure Laterality Date    CATARACT      CHOLECYSTECTOMY      because of pancreatic pseudocyst which was also removed.    COLONOSCOPY       - had upper and lower endoscopy; ; Dr Lagunas because of anemia - normal.    EXCISIONAL BIOSPY RIGHT  1973    HYSTERECTOMY      ORA/BSO in 's    IR VERTEBROPLASTY            OTHER SURGICAL HISTORY  11    cysto-dr. gerard                Social History     Socioeconomic History    Marital status:    Tobacco Use    Smoking status: Never    Smokeless tobacco: Never   Vaping Use    Vaping Use: Never used   Substance and Sexual Activity    Alcohol use: No     Alcohol/week: 0.0 standard drinks of alcohol    Drug use: No   Other Topics Concern    Caffeine Concern Yes     Comment: 1 cup of coffee daily    Exercise Yes   Social History Narrative    The patient does not use an assistive  device..      The patient does live in a home with stairs.     Social Determinants of Health     Food Insecurity: No Food Insecurity (1/17/2024)    Food Insecurity     Food Insecurity: Never true   Transportation Needs: No Transportation Needs (1/17/2024)    Transportation Needs     Lack of Transportation: No   Housing Stability: Low Risk  (1/17/2024)    Housing Stability     Housing Instability: No              Review of Systems    Positive for stated complaint:   Other systems are as noted in HPI.  Constitutional and vital signs reviewed.      All other systems reviewed and negative except as noted above.    Physical Exam     ED Triage Vitals [01/17/24 1011]   BP (!) 167/111   Pulse (!) 130   Resp 15   Temp 97.3 °F (36.3 °C)   Temp src Oral   SpO2 (!) 89 %   O2 Device None (Room air)       Current:/69 (BP Location: Left arm)   Pulse 98   Temp 98.4 °F (36.9 °C) (Oral)   Resp 14   Ht 160 cm (5' 3\")   Wt 57.3 kg   SpO2 95%   BMI 22.37 kg/m²         Physical Exam  Constitutional:       General: She is in acute distress.   HENT:      Head: Normocephalic.      Right Ear: External ear normal.      Left Ear: External ear normal.      Nose: Nose normal.      Mouth/Throat:      Mouth: Mucous membranes are moist.   Eyes:      Extraocular Movements: Extraocular movements intact.      Pupils: Pupils are equal, round, and reactive to light.   Cardiovascular:      Rate and Rhythm: Tachycardia present. Rhythm irregular.      Pulses: Normal pulses.   Pulmonary:      Effort: Pulmonary effort is normal.      Breath sounds: Normal breath sounds.   Abdominal:      Palpations: Abdomen is soft.      Tenderness: There is no abdominal tenderness.   Musculoskeletal:         General: Normal range of motion.      Cervical back: Normal range of motion.   Skin:     General: Skin is warm.      Capillary Refill: Capillary refill takes less than 2 seconds.   Neurological:      Mental Status: She is alert.      Sensory: No sensory  deficit.      Motor: No weakness.               ED Course     Labs Reviewed   COMP METABOLIC PANEL (14) - Abnormal; Notable for the following components:       Result Value    Glucose 155 (*)     Potassium 3.3 (*)     BUN/CREA Ratio 28.8 (*)     Alkaline Phosphatase 38 (*)     Globulin  2.2 (*)     All other components within normal limits   D-DIMER - Abnormal; Notable for the following components:    D-Dimer 1.34 (*)     All other components within normal limits   BASIC METABOLIC PANEL (8) - Abnormal; Notable for the following components:    Glucose 124 (*)     BUN/CREA Ratio 21.8 (*)     Calcium, Total 8.1 (*)     All other components within normal limits   POCT GLUCOSE - Abnormal; Notable for the following components:    POC Glucose  151 (*)     All other components within normal limits   CBC W/ DIFFERENTIAL - Abnormal; Notable for the following components:    RDW-SD 47.9 (*)     All other components within normal limits   CBC W/ DIFFERENTIAL - Abnormal; Notable for the following components:    HGB 11.5 (*)     RDW-SD 47.9 (*)     Neutrophil Absolute Prelim 8.57 (*)     Neutrophil Absolute 8.57 (*)     Lymphocyte Absolute 0.77 (*)     All other components within normal limits   TROPONIN I HIGH SENSITIVITY - Normal   CK CREATINE KINASE (NOT CREATININE) - Normal   MAGNESIUM - Normal   TSH W REFLEX TO FREE T4 - Normal   POTASSIUM - Normal   CBC WITH DIFFERENTIAL WITH PLATELET    Narrative:     The following orders were created for panel order CBC With Differential With Platelet.  Procedure                               Abnormality         Status                     ---------                               -----------         ------                     CBC W/ DIFFERENTIAL[[id number]]          Abnormal            Final result                 Please view results for these tests on the individual orders.   URINALYSIS, ROUTINE   CBC WITH DIFFERENTIAL WITH PLATELET    Narrative:     The following orders were created for  panel order CBC With Differential With Platelet.  Procedure                               Abnormality         Status                     ---------                               -----------         ------                     CBC W/ DIFFERENTIAL[[id number]]          Abnormal            Final result                 Please view results for these tests on the individual orders.   SCAN SLIDE   RAINBOW DRAW LAVENDER   RAINBOW DRAW LIGHT GREEN   RAINBOW DRAW BLUE     EKG    Rate, intervals and axes as noted on EKG Report.  Rate: 126  Rhythm: Atrial Fibrillation  Reading: A-fib with RVR, ST abnormalities with depression anteriorly and slightly in lead II.  QTc 434.  Abnormal EKG read by myself.  The first EKG was also A-fib 118 with nonspecific findings as well.  No significant change              ED Course as of 01/18/24 1240  ------------------------------------------------------------  Time: 01/18 1236  Comment: Labs independently interpreted by me.  Urine normal, TSH, magnesium and CK and troponin normal.  D-dimer 1.3 elevated, mild hypokalemia on the CMP and alk phos of 38 but otherwise normal.  CBC was unremarkable.  CAT scans and chest x-ray independently interpreted by me.  No acute findings such as PE, pneumonia, intracranial hemorrhage, fractures or acute intra-abdominal process.  Patient converted to a sinus rhythm and the EKG was repeated which showed normal sinus rhythm and rate with no ST elevation.  Patient was given antiemetics and fluids.  Discussed with the hospitalist for admission.  The patient's primary doctor also called the emergency room, Dr. Jay, who I spoke with as well who is aware of the situation.  Daughter was in the room and was updated.  Clinically, the vomiting may still likely be from the new medication because she has had problems with narcotics in the past.  The hospitalist and I discussed pain control and we gave her a dose of 0.2 mg Dilaudid.  This did not cause her to vomit.  Pain  consult was ordered.  ------------------------------------------------------------  Time: 01/18 1238  Comment: The third EKG showed a normal sinus rhythm and rate of 99 with left axis deviation.  Otherwise no ST elevation.  Abnormal EKG.  The first 2 EKG showed atrial fibrillation with RVR and ST depressions.  These were abnormal.  After 10 of Cardizem she converted.  Her ST segments improved.              [facility]      MRI SPINE LUMBAR (CPT=72148)    Result Date: 1/18/2024  CONCLUSION:   Chronic T12, L2 and L5 vertebral compression deformity status post T12 and L2 kyphoplasties.  No acute fracture or osseous malalignment.  Mildly progressed multilevel lumbar spine degenerative changes described above.  The most significant level represents L4-L5 where there is severe central canal narrowing and mild-to-moderate bilateral foraminal narrowing.  Lesser incidental findings as above.  A preliminary report was issued by the [facility] teleradiology service. There are no major discrepancies.    Dictated by (CST): Jose Maria Murray MD on 1/18/2024 at 6:14 AM     Finalized by (CST): Jose Maria Murray MD on 1/18/2024 at 6:23 AM          CT ABDOMEN PELVIS IV CONTRAST, NO ORAL (ER)    Result Date: 1/17/2024  CONCLUSION:   No evidence of acute abnormality in the abdomen or pelvis.  Cholecystectomy.  Mild biliary ductal prominence is nonspecific but may relate to prior cholecystectomy.  Advise biliary laboratory correlation and consider follow-up MRCP as clinically warranted.  Colonic diverticulosis.  Hysterectomy.  Lesser incidental findings as above.    Dictated by (CST): Jose Maria Murray MD on 1/17/2024 at 2:14 PM     Finalized by (CST): Jose Maria Murray MD on 1/17/2024 at 2:23 PM          CT CHEST PE AORTA (IV ONLY) (CPT=71260)    Result Date: 1/17/2024  CONCLUSION: No PE    Dictated by (CST): Gabino Putnam MD on 1/17/2024 at 2:08 PM     Finalized by (CST): Gabino Putnam MD on 1/17/2024 at 2:21 PM          CT SPINE  CERVICAL (CPT=72125)    Result Date: 1/17/2024  CONCLUSION: No fracture    Dictated by (CST): Gabino Putnam MD on 1/17/2024 at 2:06 PM     Finalized by (CST): Gabino Putnam MD on 1/17/2024 at 2:08 PM          CT BRAIN OR HEAD (64342)    Result Date: 1/17/2024  CONCLUSION: No traumatic finding    Dictated by (CST): Gabino Putnam MD on 1/17/2024 at 2:03 PM     Finalized by (CST): Gabino Putnam MD on 1/17/2024 at 2:05 PM          XR CHEST AP PORTABLE  (CPT=71045)    Result Date: 1/17/2024  CONCLUSION:  1. No acute airspace disease.  Suboptimal inspiration.  Mild bibasilar scarring/atelectasis.    Dictated by (CST): Lion Bowser MD on 1/17/2024 at 12:01 PM     Finalized by (CST): Lion Bowser MD on 1/17/2024 at 12:03 PM           Admission disposition: 1/17/2024  3:01 PM                                        Medical Decision Making  Patient with weakness and vomiting and A-fib with RVR on the monitor here stating she took her first tramadol last night.  This could certainly could be playing a part in her nausea and vomiting but this should not be causing atrial fibrillation.  PE is in the differential.  Electrolyte disturbances.  Extensive workup will take place.  She was given Zofran and IV fluids and Cardizem to slow the heart rate.    Problems Addressed:  Atrial fibrillation with rapid ventricular response (HCC): acute illness or injury  Nausea and vomiting, unspecified vomiting type: acute illness or injury    Amount and/or Complexity of Data Reviewed  Independent Historian:      Details: Patient's primary doctor and daughter gave additional history  External Data Reviewed: notes.     Details: Notes from 9 days ago from her primary's office reviewed.  Patient was started on tramadol yesterday.  Labs: ordered. Decision-making details documented in ED Course.  Radiology: ordered and independent interpretation performed. Decision-making details documented in ED Course.  ECG/medicine tests: ordered and independent  interpretation performed. Decision-making details documented in ED Course.  Discussion of management or test interpretation with external provider(s): Patient was seen by cardiologist in the emergency department.  No recommendations for anticoagulation.    Risk  Parenteral controlled substances.  Decision regarding hospitalization.  Risk Details: Age hyperlipidemia, factor V Leiden mutation, history of pancreatitis.        Disposition and Plan     Clinical Impression:  1. Atrial fibrillation with rapid ventricular response (HCC)    2. Hypokalemia    3. Nausea and vomiting, unspecified vomiting type    4. Sciatica, unspecified laterality         Disposition:  Admit  1/17/2024  3:01 pm    Follow-up:  No follow-up provider specified.  We recommend that you schedule follow up care with a primary care provider within the next three months to obtain basic health screening including reassessment of your blood pressure.      Medications Prescribed:  Current Discharge Medication List                            Hospital Problems       Present on Admission  Date Reviewed: 4/6/2022            ICD-10-CM Noted POA    * (Principal) Atrial fibrillation with rapid ventricular response (HCC) I48.91 1/17/2024 Unknown    Azotemia R79.89 1/17/2024 Yes    Hypokalemia E87.6 1/17/2024 Yes    Nausea and vomiting, unspecified vomiting type R11.2 1/17/2024 Unknown    Sciatica, unspecified laterality M54.30 1/17/2024 Unknown

## 2024-01-17 NOTE — ED QUICK NOTES
Orders for admission, patient is aware of plan and ready to go upstairs. Any questions, please call ED RN Rui at extension [phone].     Patient Covid vaccination status: Fully vaccinated     COVID Test Ordered in ED: None    COVID Suspicion at Admission: N/A    Running Infusions:    sodium chloride Stopped (01/17/24 1218)    None    Mental Status/LOC at time of transport: A&Ox4    Other pertinent information: K-rider will be started and IVP dilaudid will be given in ED prior to transfer to floor  CIWA score: N/A   NIH score:  N/A

## 2024-01-18 ENCOUNTER — APPOINTMENT (OUTPATIENT)
Dept: GENERAL RADIOLOGY | Facility: HOSPITAL | Age: 89
End: 2024-01-18
Attending: ANESTHESIOLOGY
Payer: MEDICARE

## 2024-01-18 ENCOUNTER — APPOINTMENT (OUTPATIENT)
Dept: CV DIAGNOSTICS | Facility: HOSPITAL | Age: 89
End: 2024-01-18
Attending: HOSPITALIST
Payer: MEDICARE

## 2024-01-18 LAB
ANION GAP SERPL CALC-SCNC: 5 MMOL/L (ref 0–18)
BASOPHILS # BLD AUTO: 0.02 X10(3) UL (ref 0–0.2)
BASOPHILS NFR BLD AUTO: 0.2 %
BUN BLD-MCNC: 12 MG/DL (ref 9–23)
BUN/CREAT SERPL: 21.8 (ref 10–20)
CALCIUM BLD-MCNC: 8.1 MG/DL (ref 8.7–10.4)
CHLORIDE SERPL-SCNC: 110 MMOL/L (ref 98–112)
CO2 SERPL-SCNC: 24 MMOL/L (ref 21–32)
CREAT BLD-MCNC: 0.55 MG/DL
DEPRECATED RDW RBC AUTO: 47.9 FL (ref 35.1–46.3)
EGFRCR SERPLBLD CKD-EPI 2021: 87 ML/MIN/1.73M2 (ref 60–?)
EOSINOPHIL # BLD AUTO: 0.02 X10(3) UL (ref 0–0.7)
EOSINOPHIL NFR BLD AUTO: 0.2 %
ERYTHROCYTE [DISTWIDTH] IN BLOOD BY AUTOMATED COUNT: 14.2 % (ref 11–15)
GLUCOSE BLD-MCNC: 124 MG/DL (ref 70–99)
HCT VFR BLD AUTO: 35.7 %
HGB BLD-MCNC: 11.5 G/DL
IMM GRANULOCYTES # BLD AUTO: 0.05 X10(3) UL (ref 0–1)
IMM GRANULOCYTES NFR BLD: 0.5 %
LYMPHOCYTES # BLD AUTO: 0.77 X10(3) UL (ref 1–4)
LYMPHOCYTES NFR BLD AUTO: 7.9 %
MCH RBC QN AUTO: 29.8 PG (ref 26–34)
MCHC RBC AUTO-ENTMCNC: 32.2 G/DL (ref 31–37)
MCV RBC AUTO: 92.5 FL
MONOCYTES # BLD AUTO: 0.31 X10(3) UL (ref 0.1–1)
MONOCYTES NFR BLD AUTO: 3.2 %
NEUTROPHILS # BLD AUTO: 8.57 X10 (3) UL (ref 1.5–7.7)
NEUTROPHILS # BLD AUTO: 8.57 X10(3) UL (ref 1.5–7.7)
NEUTROPHILS NFR BLD AUTO: 88 %
OSMOLALITY SERPL CALC.SUM OF ELEC: 289 MOSM/KG (ref 275–295)
PLATELET # BLD AUTO: 162 10(3)UL (ref 150–450)
POTASSIUM SERPL-SCNC: 3.6 MMOL/L (ref 3.5–5.1)
POTASSIUM SERPL-SCNC: 3.6 MMOL/L (ref 3.5–5.1)
RBC # BLD AUTO: 3.86 X10(6)UL
SODIUM SERPL-SCNC: 139 MMOL/L (ref 136–145)
WBC # BLD AUTO: 9.7 X10(3) UL (ref 4–11)

## 2024-01-18 PROCEDURE — 99233 SBSQ HOSP IP/OBS HIGH 50: CPT | Performed by: HOSPITALIST

## 2024-01-18 PROCEDURE — 3E0R33Z INTRODUCTION OF ANTI-INFLAMMATORY INTO SPINAL CANAL, PERCUTANEOUS APPROACH: ICD-10-PCS | Performed by: ANESTHESIOLOGY

## 2024-01-18 PROCEDURE — 93306 TTE W/DOPPLER COMPLETE: CPT | Performed by: HOSPITALIST

## 2024-01-18 RX ORDER — METHYLPREDNISOLONE ACETATE 80 MG/ML
INJECTION, SUSPENSION INTRA-ARTICULAR; INTRALESIONAL; INTRAMUSCULAR; SOFT TISSUE AS NEEDED
Status: DISCONTINUED | OUTPATIENT
Start: 2024-01-18 | End: 2024-01-18 | Stop reason: HOSPADM

## 2024-01-18 RX ORDER — LIDOCAINE HYDROCHLORIDE 10 MG/ML
INJECTION, SOLUTION EPIDURAL; INFILTRATION; INTRACAUDAL; PERINEURAL AS NEEDED
Status: DISCONTINUED | OUTPATIENT
Start: 2024-01-18 | End: 2024-01-18 | Stop reason: HOSPADM

## 2024-01-18 RX ORDER — SODIUM CHLORIDE 9 MG/ML
INJECTION, SOLUTION INTRAVENOUS CONTINUOUS PRN
Status: COMPLETED | OUTPATIENT
Start: 2024-01-18 | End: 2024-01-18

## 2024-01-18 NOTE — PLAN OF CARE
VSS. Ivf continues. MRI lumbar spine resulted. Tylenol for back pain with relief. 2 d echo today. Continue monitoring electrolytes.    Problem: Patient Centered Care  Goal: Patient preferences are identified and integrated in the patient's plan of care  Description: Interventions:  - What would you like us to know as we care for you? I live [place] [address]   - Provide timely, complete, and accurate information to patient/family  - Incorporate patient and family knowledge, values, beliefs, and cultural backgrounds into the planning and delivery of care  - Encourage patient/family to participate in care and decision-making at the level they choose  - Honor patient and family perspectives and choices  1/18/2024 0316 by Sylvia Villa RN  Outcome: Progressing       Problem: Patient/Family Goals  Goal: Patient/Family Long Term Goal  Description: Patient's Long Term Goal: decrease pain     Interventions:  - take meds as directed   - See additional Care Plan goals for specific interventions  Outcome: Progressing  Goal: Patient/Family Short Term Goal  Description: Patient's Short Term Goal: go home     Interventions:   - complete test and follow plan of care   - See additional Care Plan goals for specific interventions  Outcome: Progressing     Problem: PAIN - ADULT  Goal: Verbalizes/displays adequate comfort level or patient's stated pain goal  Description: INTERVENTIONS:  - Encourage pt to monitor pain and request assistance  - Assess pain using appropriate pain scale  - Administer analgesics based on type and severity of pain and evaluate response  - Implement non-pharmacological measures as appropriate and evaluate response  - Consider cultural and social influences on pain and pain management  - Manage/alleviate anxiety  - Utilize distraction and/or relaxation techniques  - Monitor for opioid side effects  - Notify MD/LIP if interventions unsuccessful or patient reports new pain  - Anticipate increased pain with  activity and pre-medicate as appropriate  1/18/2024 0316 by Sylvia Villa RN  Outcome: Progressing       Problem: CARDIOVASCULAR - ADULT  Goal: Maintains optimal cardiac output and hemodynamic stability  Description: INTERVENTIONS:  - Monitor vital signs, rhythm, and trends  - Monitor for bleeding, hypotension and signs of decreased cardiac output  - Evaluate effectiveness of vasoactive medications to optimize hemodynamic stability  - Monitor arterial and/or venous puncture sites for bleeding and/or hematoma  - Assess quality of pulses, skin color and temperature  - Assess for signs of decreased coronary artery perfusion - ex. Angina  - Evaluate fluid balance, assess for edema, trend weights  1/18/2024 0316 by Sylvia Villa RN  Outcome: Progressing    Goal: Absence of cardiac arrhythmias or at baseline  Description: INTERVENTIONS:  - Continuous cardiac monitoring, monitor vital signs, obtain 12 lead EKG if indicated  - Evaluate effectiveness of antiarrhythmic and heart rate control medications as ordered  - Initiate emergency measures for life threatening arrhythmias  - Monitor electrolytes and administer replacement therapy as ordered  1/18/2024 0316 by Sylvia Villa RN  Outcome: Progressing      Problem: SAFETY ADULT - FALL  Goal: Free from fall injury  Description: INTERVENTIONS:  - Assess pt frequently for physical needs  - Identify cognitive and physical deficits and behaviors that affect risk of falls.  - [place] fall precautions as indicated by assessment.  - Educate pt/family on patient safety including physical limitations  - Instruct pt to call for assistance with activity based on assessment  - Modify environment to reduce risk of injury  - Provide assistive devices as appropriate  - Consider OT/PT consult to assist with strengthening/mobility  - Encourage toileting schedule  1/18/2024 0316 by Sylvia Villa RN  Outcome: Progressing       Problem: DISCHARGE PLANNING  Goal:  Discharge to home or other facility with appropriate resources  Description: INTERVENTIONS:  - Identify barriers to discharge w/pt and caregiver  - Include patient/family/discharge partner in discharge planning  - Arrange for needed discharge resources and transportation as appropriate  - Identify discharge learning needs (meds, wound care, etc)  - Arrange for interpreters to assist at discharge as needed  - Consider post-discharge preferences of patient/family/discharge partner  - Complete POLST form as appropriate  - Assess patient's ability to be responsible for managing their own health  - Refer to Case Management Department for coordinating discharge planning if the patient needs post-hospital services based on physician/LIP order or complex needs related to functional status, cognitive ability or social support system  1/18/2024 0316 by ySlvia Villa, RN  Outcome: Progressing

## 2024-01-18 NOTE — PHYSICAL THERAPY NOTE
PHYSICAL THERAPY EVALUATION - INPATIENT     Room Number: 302/302-A  Evaluation Date: 1/18/2024  Type of Evaluation: Initial   Physician Order: PT Eval and Treat    Presenting Problem: A fib with RVR  Reason for Therapy: Mobility Dysfunction and Discharge Planning    PHYSICAL THERAPY ASSESSMENT   Orders received and chart reviewed. POPPY Knox approved participation in physical therapy. Session coordinated with OT, gait belt donned. PPE worn by therapist: mask and gloves. Patient was not wearing a mask during session. Patient is a 89 year old female admitted 1/17/2024 for Presenting Problem: A fib with RVR. Patient presented in bed with pain. Education provided on Physical therapy plan of care and physiological benefits of out of bed mobility. Patient with good carryover. Patient on room air. Patient currently with SBA to CGA for mobility during the session with rolling walker and without rolling walker. Patient initially able to ambulate about 20ft with bilateral hand held assist, provided with rolling walker and improved to SBA for ambulation 100ft. Patient instructed to use rolling walker at this time. Patient agreeable. Patient assisted to bedside chair with SBA, verbal cues provided for hand placement. Prior to admission, patient independent with ADL's and ambulation with rolling walker and cane as needed. Patient is currently functioning below baseline with bed mobility, transfers, and gait; requiring CGA to SBA as a result of the following impairments: pain and medical status. Patient would benefit from continued skilled therapy services at discharge for fall prevention, aerobic conditioning, and promoting functional independence in the home. Anticipate patient will return home with home-health therapy. Final discharge placement decision is directed by the inter-disciplinary team. Patient with radiating pain into legs and hamstrings.     Patient history and/or personal factors that may impact the plan of care  include history of falls. Based on the physical therapy examination of the noted systems and functional activity/participation limitations, the patient presentation is stable given the patient has history of frequent/recent falls.      Bed mobility: Independent  Transfers: Supervision  Gait Assistance: Supervision;Contact guard assist (CGA without rolling walker)  Distance (ft): 100ft  Assistive Device: Rolling walker             Patient was left in bedside chair at end of session with all needs in reach. Patient's current functional deficits include transfers and gait. Patient does have the physical skills to return to prior living environment upon D/C from the hospital and upon D/C from the hospital with home health and initial supervision. The patient's Approx Degree of Impairment: 35.83% has been calculated based on documentation in the [facility] '6 clicks' Inpatient Basic Mobility Short Form.  Research supports that patients with this level of impairment may benefit from Home with home health PT. RN aware of patient status post session.    Patient will benefit from continued IP PT services to address these deficits in preparation for discharge.    DISCHARGE RECOMMENDATIONS  PT Discharge Recommendations: Home with home health PT;Intermittent Supervision    PLAN  PT Treatment Plan: Bed mobility;Body mechanics;Patient education;Gait training;Transfer training;Balance training;Strengthening  Rehab Potential : Good  Frequency (Obs): 3-5x/week       PHYSICAL THERAPY MEDICAL/SOCIAL HISTORY   Problem List  Principal Problem:    Atrial fibrillation with rapid ventricular response (HCC)  Active Problems:    Hypokalemia    Azotemia    Nausea and vomiting, unspecified vomiting type    Sciatica, unspecified laterality    Past Medical History  Past Medical History:   Diagnosis Date    Blood disorder     Cataract     Disorder of thyroid     Factor V Leiden mutation (HCC)     High cholesterol     Osteopenia     OTHER DISEASES      pancreatitis X 2    Problems with swallowing     Recurrent urinary tract infection      Past Surgical History  Past Surgical History:   Procedure Laterality Date    CATARACT      CHOLECYSTECTOMY      because of pancreatic pseudocyst which was also removed.    COLONOSCOPY       - had upper and lower endoscopy; ; Dr Lagunas because of anemia - normal.    EXCISIONAL BIOSPY RIGHT  1973    HYSTERECTOMY      ORA/BSO in s    IR VERTEBROPLASTY            OTHER SURGICAL HISTORY  11    cysto-dr. gerard     HOME SITUATION  Type of Home: Independent living facility ([facility])   Home Layout: One level  Stairs to Enter : 0  Railing: No  Stairs to Bedroom: 0  Railing: No  Lives With: Alone  Drives: Yes  Patient Owned Equipment: Rolling walker;Cane  Patient Regularly Uses: Glasses    Prior Level of [place]: Patient reports being independent in ADL's and ambulation with rolling walker or cane as needed prior to admission to the hospital.     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\"    PHYSICAL THERAPY EXAMINATION     OBJECTIVE  Precautions: Bed/chair alarm;Spine  Fall Risk: Standard fall risk    WEIGHT BEARING RESTRICTION  Weight Bearing Restriction: None                PAIN ASSESSMENT  Rating: Unable to rate  Location: back  Management Techniques: Activity promotion;Body mechanics;Repositioning    COGNITION  Overall Cognitive Status:  WFL - within functional limits    RANGE OF MOTION AND STRENGTH ASSESSMENT    Lower extremity ROM is within functional limits  BLE WNL    Lower extremity strength is within functional limits  BLE WNL    BALANCE  Static Sitting: Good  Dynamic Sitting: Fair +  Static Standing: Fair  Dynamic Standing: Fair -    AM-PAC '6-Clicks' INPATIENT SHORT FORM - BASIC MOBILITY  How much difficulty does the patient currently have...  Patient Difficulty: Turning over in bed (including adjusting bedclothes, sheets and blankets)?: None   Patient Difficulty: Sitting down on and standing up from a  chair with arms (e.g., wheelchair, bedside commode, etc.): A Little   Patient Difficulty: Moving from lying on back to sitting on the side of the bed?: None   How much help from another person does the patient currently need...   Help from Another: Moving to and from a bed to a chair (including a wheelchair)?: A Little   Help from Another: Need to walk in hospital room?: A Little   Help from Another: Climbing 3-5 steps with a railing?: A Little     AM-PAC Score:  Raw Score: 20   Approx Degree of Impairment: 35.83%   Standardized Score (AM-PAC Scale): 47.67   CMS Modifier (G-Code): CJ    Exercise/Education Provided:  Bed mobility  Body mechanics  Gait training  Transfer training    Patient End of Session: Up in chair;Needs met;Call light within reach;RN aware of session/findings;All patient questions and concerns addressed    CURRENT GOALS  Goals to be met by: 1/31/24  Patient Goal Patient's self-stated goal is: to go home   Goal #1 Patient is able to demonstrate supine - sit EOB @ level: independent     Goal #1   Current Status    Goal #2 Patient is able to demonstrate transfers Sit to/from Stand at assistance level: modified independent with walker - rolling     Goal #2  Current Status    Goal #3 Patient is able to ambulate 100 feet with assist device: walker - rolling at assistance level: modified independent   Goal #3   Current Status    Goal #4    Goal #4   Current Status    Goal #5 Patient to demonstrate independence with home activity/exercise instructions provided to patient in preparation for discharge.   Goal #5   Current Status    Goal #6    Goal #6  Current Status     Patient Evaluation Complexity Level:  History Low - no personal factors and/or co-morbidities   Examination of body systems Low - addressing 1-2 elements   Clinical Presentation Low - Stable   Clinical Decision Making Low Complexity     Gait Training: 10 minutes  Therapeutic Activity: 13 minutes

## 2024-01-18 NOTE — CHRONIC PAIN
[facility]  Report of Consultation    Christina Giraldo Patient Status:  Inpatient    1933 MRN [id number]   Location [facility] 3W/SW Attending Kayleen Graham MD   Hosp Day # 1 PCP Marce Smith MD     Date of Admission:  2024  Date of Consult:  2024    Reason for Consultation:   1. Atrial fibrillation with rapid ventricular response (HCC)    2. Hypokalemia    3. Nausea and vomiting, unspecified vomiting type    4. Sciatica, unspecified laterality      LBP with neurogenic claudication  Spinal stenosis severe L4/5  History of Present Illness:  Christina Giraldo is a a(n) 89 year old female with hx of fall@ 4 months ago resulting in wrist fx and pelvic fx. Hx of Comp Fx T12 and L2 s/p kyphoplasty.  Patient states she continued with LBP and R>L leg radicular pain attempted PT naprosyn Advil Tylenol gabapentin and then Tramadol which made her sick with N&V which brought her to the hospital. Today she is still c/o LBP with activity /neurogenetic claudication but is comfortable at rest. No persistent numbness or change in bowel or bladder.    History:  Past Medical History:   Diagnosis Date    Blood disorder     Cataract     Disorder of thyroid     Factor V Leiden mutation (HCC)     High cholesterol     Osteopenia     OTHER DISEASES     pancreatitis X 2    Problems with swallowing     Recurrent urinary tract infection      Past Surgical History:   Procedure Laterality Date    CATARACT      CHOLECYSTECTOMY      because of pancreatic pseudocyst which was also removed.    COLONOSCOPY       - had upper and lower endoscopy; ; Dr Lagunas because of anemia - normal.    EXCISIONAL BIOSPY RIGHT  1973    HYSTERECTOMY      ORA/BSO in     IR VERTEBROPLASTY            OTHER SURGICAL HISTORY  11    cysto-dr. gerard     Family History   Problem Relation Age of Onset    Other (Other) Father         cva    Cancer Mother 80        breast cancer    Breast  Cancer Mother 80        80    Cancer Other         breast    Breast Cancer Maternal Aunt 82        82      reports that she has never smoked. She has never used smokeless tobacco. She reports that she does not drink alcohol and does not use drugs.    Allergies:  Allergies   Allergen Reactions    Cephalexin OTHER (SEE COMMENTS)     Pt does not remember.     Codeine [Opioid Analgesics] NAUSEA AND VOMITING    Etodolac OTHER (SEE COMMENTS)    Morphine NAUSEA AND VOMITING    Terbinafine OTHER (SEE COMMENTS)     Medication was not working.    Tramadol NAUSEA AND VOMITING    Ciprofloxacin DIZZINESS    Lamisil DIARRHEA    Omeprazole NAUSEA AND VOMITING       Medications:    Current Facility-Administered Medications:     metoprolol tartrate (Lopressor) tab 25 mg, 25 mg, Oral, 2x Daily(Beta Blocker)    [COMPLETED] sodium chloride 0.9 % IV bolus 500 mL, 500 mL, Intravenous, Once **FOLLOWED BY** sodium chloride 0.9% infusion, , Intravenous, Continuous    sodium chloride 0.9% infusion, , Intravenous, Continuous    acetaminophen (Tylenol Extra Strength) tab 500 mg, 500 mg, Oral, Q4H PRN    ondansetron (Zofran) 4 MG/2ML injection 4 mg, 4 mg, Intravenous, Q6H PRN    metoclopramide (Reglan) 5 mg/mL injection 10 mg, 10 mg, Intravenous, Q8H PRN    HYDROmorphone (Dilaudid) 1 MG/ML injection 0.2 mg, 0.2 mg, Intravenous, Q2H PRN **OR** HYDROmorphone (Dilaudid) 1 MG/ML injection 0.4 mg, 0.4 mg, Intravenous, Q2H PRN **OR** HYDROmorphone (Dilaudid) 1 MG/ML injection 0.8 mg, 0.8 mg, Intravenous, Q2H PRN    methylPREDNISolone sodium succinate (Solu-MEDROL) injection 40 mg, 40 mg, Intravenous, Q12H    HYDROcodone-acetaminophen (Norco) 5-325 MG per tab 1 tablet, 1 tablet, Oral, Q6H PRN    levothyroxine (Synthroid) tab 88 mcg, 88 mcg, Oral, QAM AC    nitrofurantoin monohydrate macro (Macrobid) cap 100 mg, 100 mg, Oral, Daily    famotidine (Pepcid) tab 20 mg, 20 mg, Oral, Daily  Scheduled Meds:   metoprolol tartrate  25 mg Oral 2x Daily(Beta  Blocker)    methylPREDNISolone  40 mg Intravenous Q12H    levothyroxine  88 mcg Oral QAM AC    nitrofurantoin monohydrate macro  100 mg Oral Daily    famotidine  20 mg Oral Daily     Continuous Infusions:   sodium chloride Stopped (01/17/24 1218)    sodium chloride 100 mL/hr at 01/18/24 0459     PRN Meds:.acetaminophen, ondansetron, metoclopramide, HYDROmorphone **OR** HYDROmorphone **OR** HYDROmorphone, HYDROcodone-acetaminophen    Review of Systems:  Pertinent items are noted in HPI.    Physical Exam:  Blood pressure 160/69, pulse 98, temperature 98.4 °F (36.9 °C), temperature source Oral, resp. rate 14, height 5' 3\" (1.6 m), weight 126 lb 4.8 oz (57.3 kg), SpO2 95%, not currently breastfeeding.  General: Alert and oriented x3, NAD, appears stated age, appropriate disposition and demeanor, answers questions appropriately appears to be comfortable in chair  Head: normocephalic, atraumatic  Eyes: anicteric; no injection  Ears: no obvious deformities noted   Nose: externally grossly within normal limits, no unusual discharge or rhinorrhea   Throat: lips grossly within normal limits by visual exam externally  Neck: supple, trachea midline, no obvious JVD  Chest: no obvious visual deformity  Respiratory: Non labored   SLR;' sitting neg  Laboratory Data:  Lab Results   Component Value Date    WBC 9.7 01/18/2024    HGB 11.5 01/18/2024    HCT 35.7 01/18/2024    .0 01/18/2024    CREATSERUM 0.55 01/18/2024    BUN 12 01/18/2024     01/18/2024    K 3.6 01/18/2024    K 3.6 01/18/2024     01/18/2024    CO2 24.0 01/18/2024     01/18/2024    CA 8.1 01/18/2024       Imaging:  Narrative   PROCEDURE: MRI SPINE LUMBAR (CPT=72148)     COMPARISON: [facility], CT ABDOMEN PELVIS IV CONTRAST NO ORAL (ER), 1/17/2024, 1:45 PM.  [facility], MRI SPINE LUMBAR (CPT=72148), 2/24/2017, 11:20 AM.     INDICATIONS: lumbar radiculopathy     TECHNIQUE: A variety of imaging planes and  parameters were utilized for visualization of suspected pathology.     FINDINGS:  NUMERATION: For the purposes of this examination, the lowest fully formed disc space is designated L5-S1.    BONES: Moderate chronic fracture deformity T12 status post kyphoplasty.  Mild-to-moderate chronic fracture deformity L2 status post kyphoplasty.  Moderate chronic appearing fracture deformity L5.  No acute fracture or osseous malalignment.  Multilevel  degenerative change with marginal osteophyte formation and facet hypertrophy.  Bone marrow signal is otherwise unremarkable.  CORD/CAUDA EQUINA: Normal caliber, contour, and signal intensity.  The conus terminates at T12.  PARASPINAL AREA: Unremarkable.    OTHER: Bilateral small T2 hyperintense foci in the kidneys incompletely characterized but probably representing small cysts.  Nonspecific urinary bladder distension.     LUMBAR DISC LEVELS:  L1-L2: Circumferential disc bulge with small central superimposed protrusion.  There is ligamentum flavum hypertrophy.  Mild central canal narrowing.  Moderate bilateral foraminal narrowing noted with facet arthropathy.  Similar findings were seen  previously.  L2-L3: Circumferential disc bulge with ligamentum flavum hypertrophy.  Mild central canal narrowing.  Moderate bilateral foraminal narrowing right greater than left with facet arthropathy.  Overall findings are mildly progressed.  L3-L4: Circumferential disc bulge with ligamentum flavum hypertrophy.  There is mild-to-moderate central canal narrowing mildly progressed.  Mild bilateral foraminal narrowing with facet arthropathy.  L4-L5: Circumferential disc bulge with ligamentum flavum hypertrophy.  Severe central canal narrowing.  This is progressed since the prior study.  Mild-to-moderate bilateral foraminal narrowing with facet arthropathy.  L5-S1: Circumferential disc bulge with ligamentum flavum hypertrophy.  Moderate central canal narrowing is mildly progressed.  Left greater than  right bilateral moderate foraminal narrowing facet arthropathy.               Impression   CONCLUSION:     Chronic T12, L2 and L5 vertebral compression deformity status post T12 and L2 kyphoplasties.  No acute fracture or osseous malalignment.     Mildly progressed multilevel lumbar spine degenerative changes described above.  The most significant level represents L4-L5 where there is severe central canal narrowing and mild-to-moderate bilateral foraminal narrowing.     Lesser incidental findings as above.     A preliminary report was issued by the [facility] Radiology teleradiology service. There are no major discrepancies.           Dictated by (CST): Jose Maria Murray MD on 1/18/2024 at 6:14 AM      Finalized by (CST): Jose Maria Murray MD on 1/18/2024 at 6:23 AM           Impression:  Patient Active Problem List   Diagnosis    Hypothyroidism, unspecified type    Recurrent UTI    H/O: hysterectomy    Factor V Leiden mutation ([facility])    Compression fracture    Anemia    Actinic keratosis    PAF (paroxysmal atrial fibrillation) ([facility])    Senile osteoporosis    Pathologic fracture of vertebrae    L3-4 stable>L4-5 unstable grade1 spondylolisthesis    L5-S1 left mod-large/right mod far lateral, L4-5 mod left & left foraminal, L3-4 mod diffuse, L2-3 left mild-mod foraminal, L1-2 mod diffuse bulging discs    L3-4 mild-mod, L4-5 mod central stenosis    Bilateral neck pain    Right hand weakness    bilateral C5-6 radiculopathies    C6-7 left mild-mod, C5-6 right mild-mod paracentral/right mod foraminal, C3-4 mild-mod central & right foraminal2-3 right mild paracentral bulging discs    C4-5 right mod HNP    C6-7 left mod foraminal, C4-5 mild central stenosis    Thoracic back pain    Other closed displaced fracture of proximal end of left humerus    Tear of left rotator cuff, unspecified tear extent    Vitamin D deficiency    Other closed intra-articular fracture of distal end of right radius, initial encounter    Other closed  intra-articular fracture of distal end of right radius with malunion, subsequent encounter    Right wrist pain    SX/GLOBAL/  /EOSC/RIGHT EXTENSOR TENOYYSIS AND TENOSYNOVECTOMY 4TH EXTENSOR COMPARTMENT / DOS 01/17/17 / EXP 04/17/17    Ruptured extensor tendon of hand or wrist, right, initial encounter    Chronic left-sided low back pain with left-sided sciatica    Chronic left sacroiliac joint pain    left L5 radiculopathy    L3-4 & L4-5 mild grade 1 spondylolisthesis that are mildly unstable    L5-S1 left mod-severe/right mild-mod foraminal stenosis    L5 chronic and L2 chronic compression fractures with L2 vertebral augmentation    T12 chronic compression fracture and s/p augmentation    Lumbar spinal stenosis    Lumbar radiculitis    Cramp of extremity    Gastroesophageal reflux disease without esophagitis    Hoarseness of voice    Protein-calorie malnutrition, unspecified severity (HCC)    Hypokalemia    Azotemia    Atrial fibrillation with rapid ventricular response (HCC)    Nausea and vomiting, unspecified vomiting type    Sciatica, unspecified laterality           Recommendations:    1 LESI L4/5 with xray    I have informed Christina Giraldo  of the risks of neuraxial injection including, but not limited to: failure, headache, backache, spinal, unilateral/patchy block, difficulty breathing, infection, bleeding, nerve damage, paralysis, death. The patient desires the proposed neuraxial injection as planned.      Pain service to follow.       Comprehensive analgesic plan was formulated. Conservative vs. Aggressive measures were discussed at length including pharmacotherapy (eg. Anti- inflammatories, muscle relaxants, neuropathic medications, oral steroids, analgesics), injections, and further testing. Risks and benefits of all options were discussed at length to patients satisfaction during a comprehensive interactive discussion. All questions were answered during extended questions and answer  session. Patient agreeable to discussion plan. Greater than 50% of the time was spent with counseling (nature of discussion centered around pain, therapy, and treatment options), face to face time, time spent reviewing data, obtaining patient information and discussing the care with the patients health care providers.     Thank you for allowing me to participate in the care of your patient.    Total time: 27 mins    PASQUALE REED MD  1/18/2024  Anesthesia Chronic Pain Service [phone]

## 2024-01-18 NOTE — PROGRESS NOTES
[facility] Pharmacy Note:  Renal Dose Adjustment    Christina Giraldo has been prescribed famotidine (PEPCID) 20 mg orally every 12 hours.    Estimated Creatinine Clearance: 46.6 mL/min (based on SCr of 0.66 mg/dL).    Calculated creatinine clearance is < 50 ml/min, therefore the dose of famotidine (Pepcid) has been changed to 20 mg orally every 24 hours per P&T approved protocol. Pharmacy will continue to follow, and if renal function improves, will resume the original order.    Thank you,  Maddy Abbasi, PharmD  1/17/2024 6:33 PM

## 2024-01-18 NOTE — OCCUPATIONAL THERAPY NOTE
OCCUPATIONAL THERAPY EVALUATION - INPATIENT     Room Number: 302/302-A  Evaluation Date: 1/18/2024  Type of Evaluation: Quick Eval  Presenting Problem: A fib with RVR, n/v s/t tramadol, intractable lumbar radiculopathy    MRI Lumbar Spine 1/17/24: Chronic T12, L2 and L5 vertebral compression deformity status post T12 and L2 kyphoplasties.  No acute fracture or osseous malalignment. Mildly progressed multilevel lumbar spine degenerative changes described above.  The most significant level represents L4-L5 where there is severe central canal narrowing and mild-to-moderate bilateral foraminal narrowing.      Physician Order: IP Consult to Occupational Therapy  Reason for Therapy: ADL/IADL Dysfunction and Discharge Planning    OCCUPATIONAL THERAPY ASSESSMENT   Patient is a 89 year old female admitted 1/17/2024 for A fib with RVR, n/v s/t tramadol, intractable lumbar radiculopathy. Orders received, chart reviewed. RN approved patient participation. Patient reported she is independent with ADLs and driving at baseline; MI for fx mobility using RW/cane. Appears to be close to functional baseline. Patient reported she plans to hire an aide for initial assist with I/ADLs as needed upon return to [facility].     The patient's Approx Degree of Impairment: 32.79% has been calculated based on documentation in the [facility] '6 clicks' Inpatient Daily Activity Short Form.  Research supports that patients with this level of impairment may benefit from discharge home without skilled OT needs. Recommend home and initial assist upon discharge to ensure safety with ADLs and fx mobility in home environment.    DISCHARGE RECOMMENDATIONS  OT Discharge Recommendations: Home (initial assist)    PLAN  Patient has been evaluated and presents with no skilled Occupational Therapy needs  at this time.  Patient will be discharged from Occupational Therapy services. Please re-order if a new functional limitation presents during this  admission.    OCCUPATIONAL THERAPY MEDICAL/SOCIAL HISTORY   Problem List  Principal Problem:    Atrial fibrillation with rapid ventricular response (HCC)  Active Problems:    Hypokalemia    Azotemia    Nausea and vomiting, unspecified vomiting type    Sciatica, unspecified laterality    HOME SITUATION  Type of Home: Independent living facility ([facility])  Home Layout: One level  Lives With: Alone  Occupation/Status: Retired PT  Drives: Yes  Patient Regularly Uses: Glasses  Stairs in Home: 0  Use of Assistive Device(s): RW, cane    Prior Level of [place]: Independent with ADLs and driving. MI for fx mobility using either cane or RW. Reported hx of fall ~3 months ago.    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)    OCCUPATIONAL THERAPY EXAMINATION    OBJECTIVE  Precautions: Bed/chair alarm; Spine  Fall Risk: Standard fall risk    WEIGHT BEARING RESTRICTION  None    PAIN ASSESSMENT  Rating: -- (not rated; improved with meds)  Location: \"buttock down both legs\"  Management Techniques: Activity promotion; Body mechanics; Repositioning; Nurse notified    ACTIVITY TOLERANCE  Pulse: 98  Heart Rate Source: Monitor       COGNITION  Overall Cognitive Status:  WFL - within functional limits    SENSATION  Light touch:  intact    RANGE OF MOTION   Upper extremity ROM is within functional limits     STRENGTH ASSESSMENT  Upper extremity strength is within functional limits     COORDINATION  Gross Motor: WFL   Fine Motor: WFL     ACTIVITIES OF DAILY LIVING ASSESSMENT  AM-PAC ‘6-Clicks’ Inpatient Daily Activity Short Form  How much help from another person does the patient currently need…  -   Putting on and taking off regular lower body clothing?: A Little  -   Bathing (including washing, rinsing, drying)?: A Little  -   Toileting, which includes using toilet, bedpan or urinal? : A Little  -   Putting on and taking off regular upper body clothing?: None  -   Taking care  of personal grooming such as brushing teeth?: None  -   Eating meals?: None    AM-PAC Score:  Score: 21  Approx Degree of Impairment: 32.79%  Standardized Score (AM-PAC Scale): 44.27  CMS Modifier (G-Code): CJ    BED MOBILITY  Supine to Sit: Independent via log roll     FUNCTIONAL TRANSFER ASSESSMENT  Sit to Stand from EOB: SUP  Chair Transfer: SUP  Comments:  Min verbal cues for safe hand placement during transfers.    FUNCTIONAL MOBILITY  SBA progressing to SUP for hallway fx mobility using RW    FUNCTIONAL ADL ASSESSMENT  Eating: independent seated (per obs)   Grooming: stand-by assist standing at sink (per obs)   UB Dressing: independent seated (per obs)   LB Dressing: stand-by assist - demonstrated ability to perform figure-4 with B LE while lying in bed to facilitate donning socks  Toileting: supervision seated (per obs)     EDUCATION PROVIDED  Patient : Role of Occupational Therapy; Plan of Care; Discharge Recommendations; Functional Transfer Techniques; Fall Prevention; Posture/Positioning; Energy Conservation; Compensatory ADL Techniques; Proper Body Mechanics  Patient's Response to Education: Verbalized Understanding; Returned Demonstration    Patient End of Session: Up in chair;Call light within reach;Needs met;RN aware of session/findings;All patient questions and concerns addressed;Alarm set    Patient was able to achieve the following ...   Patient able to toilet transfer   SUP based on similar fx t/f's    Patient able to dress lower extremities   SBA    Patient/Caregiver able to demonstrate safety with ADLS  at previous functional level     Patient Evaluation Complexity Level:   Occupational Profile/Medical History LOW - Brief history including review of medical or therapy records    Specific performance deficits impacting engagement in ADL/IADL LOW  1 - 3 performance deficits    Client Assessment/Performance Deficits LOW - No comorbidities nor modifications of tasks    Clinical Decision Making LOW -  Analysis of occupational profile, problem-focused assessments, limited treatment options    Overall Complexity LOW     OT Session Time  Therapeutic Activity: 17 minutes    CHINA Posada/L  [facility]  #[id number]

## 2024-01-18 NOTE — CM/SW NOTE
01/18/24 1100   CM/SW Referral Data   Referral Source Social Work (self-referral)   Reason for Referral Discharge planning   Informant Patient   Medical Hx   Does patient have an established PCP? Yes  (Marce Luis)   Patient Info   Patient's Current Mental Status at Time of Assessment Alert;Oriented   Patient's Home Environment Independent Living  ([facility])   Patient lives with Alone   Patient Status Prior to Admission   Independent with ADLs and Mobility Yes   Services in place prior to admission DME/Supplies at home   Type of DME/Supplies Straight Cane;Standard Walker   Discharge Needs   Anticipated D/C needs Home health care   Choice of Post-Acute Provider   Informed patient of right to choose their preferred provider Yes   Patient declines recommended services Yes  (wants to f/up w/ her own MD post DC for therapy needs)     SW self referred pt for DC Planning. Per chart, PT recommends HH at DC.    Met w/ pt at bedside. Above assessment completed.    Pt lives at [facility] for the past 6yrs. Pt confirmed having a cane and walker but only using PRN at home.    QUIRINO discussed HH recommendation. Pt is declining and stated \"I want to follow up with my own doctor about that.\" Pt also stated she plans to speak w/ [facility] about part time/temp CG's when she returns.    QUIRINO notified liaison Patricia of above. Per Patricia's request, QUIRINO asked MD to place outpatient PT Rx/order prior to pt's DC.    PLAN: [facility] w/ onsite PT - pending outpatient Rx/order & med clear      SW/CM to remain available for support and/or discharge planning.         Sondra Velez, MSW, LSW [id number]

## 2024-01-18 NOTE — PROGRESS NOTES
[facility]    Progress Note    Christina Giraldo Patient Status:  Observation    1933 MRN [id number]   Location [facility] 3W/SW Attending Kayleen Graham MD   Hosp Day # 0 PCP Marce Smith MD     Chief Complaint:     Afib with RVR  Lumbar Radiculopathy.    Subjective:   Subjective:    Patient seen and examined  Endorsing Pain    Objective:   Blood pressure 160/69, pulse 98, temperature 98.4 °F (36.9 °C), temperature source Oral, resp. rate 14, height 5' 3\" (1.6 m), weight 126 lb 4.8 oz (57.3 kg), SpO2 95%, not currently breastfeeding.  Physical Exam    GENERAL:  Alert and oriented to time, place, and person.  Tremulous, moderate distress secondary to her back pain.  HEENT:  Atraumatic.  Oropharynx clear.  Dry mucous membranes.  Normal hard and soft palate.  Eyes:  Anicteric sclerae.   NECK:  Supple.  No lymphadenopathy.  Trachea midline.  Full range of motion.  LUNGS:  Clear to auscultation bilaterally.  Normal respiratory effort.    HEART:  Regular rate, rhythm.  S1 and S2 auscultated.  No murmur.   ABDOMEN:  Soft, nondistended.  No tenderness.  Positive bowel sounds.    EXTREMITIES:  No edema, clubbing, or cyanosis.  NEUROLOGIC:  Motor and sensory intact.  Did not perform straight leg raising maneuver because of her intractable pain.    Results:   Lab Results   Component Value Date    WBC 9.7 2024    HGB 11.5 (L) 2024    HCT 35.7 2024    .0 2024    CREATSERUM 0.55 2024    BUN 12 2024     2024    K 3.6 2024    K 3.6 2024     2024    CO2 24.0 2024     (H) 2024    CA 8.1 (L) 2024    ALB 4.2 2024    ALKPHO 38 (L) 2024    BILT 0.8 2024    TP 6.4 2024    AST 20 2024    ALT 27 2024    PTT 22.4 (L) 2022    INR 0.9 2022    T4F 1.49 2021    TSH 1.858 2024    DDIMER 1.34 (H) 2024    ESRML 12 2020    CRP  0.64 (H) 12/16/2020    MG 2.0 01/17/2024    TROPHS 3 01/17/2024    CK 81 01/17/2024    B12 766 07/17/2017       MRI SPINE LUMBAR (CPT=72148)    Result Date: 1/18/2024  CONCLUSION:   Chronic T12, L2 and L5 vertebral compression deformity status post T12 and L2 kyphoplasties.  No acute fracture or osseous malalignment.  Mildly progressed multilevel lumbar spine degenerative changes described above.  The most significant level represents L4-L5 where there is severe central canal narrowing and mild-to-moderate bilateral foraminal narrowing.  Lesser incidental findings as above.  A preliminary report was issued by the [facility] teleradiology service. There are no major discrepancies.    Dictated by (CST): Jose Maria Murray MD on 1/18/2024 at 6:14 AM     Finalized by (CST): Jose Maria Murray MD on 1/18/2024 at 6:23 AM          CT ABDOMEN PELVIS IV CONTRAST, NO ORAL (ER)    Result Date: 1/17/2024  CONCLUSION:   No evidence of acute abnormality in the abdomen or pelvis.  Cholecystectomy.  Mild biliary ductal prominence is nonspecific but may relate to prior cholecystectomy.  Advise biliary laboratory correlation and consider follow-up MRCP as clinically warranted.  Colonic diverticulosis.  Hysterectomy.  Lesser incidental findings as above.    Dictated by (CST): Jose Maria Murray MD on 1/17/2024 at 2:14 PM     Finalized by (CST): Jose Maria Murray MD on 1/17/2024 at 2:23 PM          CT CHEST PE AORTA (IV ONLY) (CPT=71260)    Result Date: 1/17/2024  CONCLUSION: No PE    Dictated by (CST): Gabino Putnam MD on 1/17/2024 at 2:08 PM     Finalized by (CST): Gabino Putnam MD on 1/17/2024 at 2:21 PM          CT SPINE CERVICAL (CPT=72125)    Result Date: 1/17/2024  CONCLUSION: No fracture    Dictated by (CST): Gabino Putnam MD on 1/17/2024 at 2:06 PM     Finalized by (CST): Gabino Putnam MD on 1/17/2024 at 2:08 PM          CT BRAIN OR HEAD (60303)    Result Date: 1/17/2024  CONCLUSION: No traumatic finding    Dictated by  (CST): Gabino Putnam MD on 1/17/2024 at 2:03 PM     Finalized by (CST): Gabino Putnam MD on 1/17/2024 at 2:05 PM          XR CHEST AP PORTABLE  (CPT=71045)    Result Date: 1/17/2024  CONCLUSION:  1. No acute airspace disease.  Suboptimal inspiration.  Mild bibasilar scarring/atelectasis.    Dictated by (CST): Lion Bowser MD on 1/17/2024 at 12:01 PM     Finalized by (CST): Lion Bowser MD on 1/17/2024 at 12:03 PM         EKG 12 Lead    Result Date: 1/17/2024  Normal sinus rhythm Left axis deviation Abnormal ECG When compared with ECG of 17-JAN-2024 10:32, Sinus rhythm has replaced Atrial fibrillation ST no longer depressed in Inferior leads ST no longer depressed in Anterior-lateral leads Confirmed by MARJORIE DOMINGO JORDAN ([id number]) on 1/17/2024 4:28:39 PM    EKG 12 Lead    Result Date: 1/17/2024  Atrial fibrillation with rapid ventricular response Marked ST abnormality, possible inferior subendocardial injury Abnormal ECG When compared with ECG of 17-JAN-2024 10:12, No significant change was found Confirmed by MARJORIE DOMINGO JORDAN ([id number]) on 1/17/2024 4:18:22 PM    EKG 12 Lead    Result Date: 1/17/2024  Atrial fibrillation with rapid ventricular response Nonspecific ST abnormality Abnormal ECG No previous ECGs found in [facility] Confirmed by MARJORIE DOMINGO JORDAN ([id number]) on 1/17/2024 4:17:49 PM     Assessment & Plan:     Atrial fibrillation with rapid ventricular response (HCC)  -Echo reviewed  -Patient Cardiology recommendations  -Monitor on telemetry    Intractable lumbar radiculopathy  -MRI has been reviewed  -Shows findings that are consistent with chronic fractures and severe canal stenosis at L4-L5  -Continue IV analgesics  -Will appreciate recommendations from pain service      Hypokalemia  -Continue electrolyte replacement protocol  -Continue monitor      Nausea and vomiting, unspecified vomiting type  -continue to monitor  -continue IV antiemetics PRN    VTE ppx: SCD    Global A/P  -Reviewed previous  consultant notes  -Reviewed CBC, BMP, Mag, and Phos  -Reviewed tests ordered  -Repeat labs in am  -MDM: High, severe exacerbation of chronic illness posing a threat to life. IV medications requiring close inpatient monitoring.         **Certification      PHYSICIAN Certification of Need for Inpatient Hospitalization - Initial Certification    Patient will require inpatient services that will reasonably be expected to span two midnight's based on the clinical documentation in H+P.   Based on patients current state of illness, I anticipate that, after discharge, patient will require TBD.      Kayleen Graham MD

## 2024-01-18 NOTE — PROGRESS NOTES
Progress Note  Christina Giraldo Patient Status:  Observation    1933 MRN [id number]   Location [facility] 3W/SW Attending Nicci Good MD   Hosp Day # 0 PCP Marce Smith MD     Subjective:  Up in chair, feeling well. Denies cp, sob.     Objective:  /69 (BP Location: Left arm)   Pulse 91   Temp 98.4 °F (36.9 °C) (Oral)   Resp 14   Ht 5' 3\" (1.6 m)   Wt 126 lb 4.8 oz (57.3 kg)   SpO2 95%   BMI 22.37 kg/m²     Telemetry: nsr      Intake/Output:    Intake/Output Summary (Last 24 hours) at 2024 0835  Last data filed at 2024 0743  Gross per 24 hour   Intake 2620 ml   Output 1750 ml   Net 870 ml       Last 3 Weights   24 0500 126 lb 4.8 oz (57.3 kg)   24 1708 114 lb 12.8 oz (52.1 kg)   24 1011 120 lb (54.4 kg)   24 1040 120 lb (54.4 kg)   23 0845 120 lb (54.4 kg)       Labs:  Recent Labs   Lab 24  1018 24  0700   * 124*   BUN 19 12   CREATSERUM 0.66 0.55   EGFRCR 83 87   CA 8.9 8.1*    139   K 3.3* 3.6  3.6    110   CO2 22.0 24.0     Recent Labs   Lab 24  1018 24  0700   RBC 4.34 3.86   HGB 12.9 11.5*   HCT 40.4 35.7   MCV 93.1 92.5   MCH 29.7 29.8   MCHC 31.9 32.2   RDW 13.9 14.2   NEPRELIM 2.91 8.57*   WBC 7.3 9.7   .0  --          Recent Labs   Lab 24  1018   TROPHS 3   CK 81       Review of Systems   Cardiovascular: Negative.    Respiratory: Negative.         Physical Exam:    Gen: alert, oriented x 3, NAD  Heent: pupils equal, reactive. Mucous membranes moist.   Neck: no jvd  Cardiac: regular rate and rhythm, normal S1,S2  Lungs: CTA  Abd: soft, NT/ND +bs  Ext: no edema  Skin: Warm, dry  Neuro: No focal deficits        Medications:     methylPREDNISolone  40 mg Intravenous Q12H    levothyroxine  88 mcg Oral QAM AC    nitrofurantoin monohydrate macro  100 mg Oral Daily    famotidine  20 mg Oral Daily      sodium chloride Stopped (24 1218)    sodium chloride 100 mL/hr at  01/18/24 0459           Assessment:  Intractable n/v, back pain  Per primary  New Afib RVR, PAF, now maintaining NSR after Iv cardizem  14 day MCT at dc.   CTA negative for PE  No AC for now given short episode. Patient also would like to defer for now  HTN-mildly elevated.  Dizziness, presyncope likely 2/2 decreased po intake, meds    Plan:  Echo with preserved LVEF 55-60%, no rwmas.   Bp/hr elevated this am. Low dose bb added.  Tele review: maintaining nsr  Will plan for 14 day MCT after dc to assess recurrence of Afib.   No further inpatient cardiac work up needed at this time.    Plan of care discussed with patient, RN.  Attempted to call patient's pcp per patient request with no answer.     Abby Garvin, APRN  1/18/2024  8:35 AM  [phone]  [facility] [phone]

## 2024-01-18 NOTE — PROCEDURES
Procedure  Interlaminar epidural steroid injection lumbar 4 5 with C-arm guidance  All the risk and benefits of the procedure were discussed in detail with Christina she understood the risk in detail agreed to proceed  Patient taken to the fluoroscopy suite and placed in the prone position sterile prep and dressed in the usual fashion L4 and 5 were identified and fluoroscopic guidance and marked 3 cc of 1% lidocaine was used anesthetize the skin and subcutaneous tissue an 18-gauge Touhy needle inserted into the epidural space using loss-of-resistance technique along with intermittent fluoroscopy views once the epidural space was found no aspiration fluid paresthesia heme or CSF 80 mg of Depo-Medrol and 3 cc of normal saline were injected into the epidural space tolerated procedure well monitor appropriate taken back to recovery room discharge in good condition images are stored and retrievable thank you

## 2024-01-18 NOTE — PLAN OF CARE
Patient got epidural for chronic pain today. Metoprolol twice daily started per cards. Patient and daughter updated on plan of care. Daughter called and updated on patient per request and plans for possible discharge back to [facility] with physical therapy tomorrow.     Problem: Patient Centered Care  Goal: Patient preferences are identified and integrated in the patient's plan of care  Description: Interventions:  - What would you like us to know as we care for you? I live at [facility]   - Provide timely, complete, and accurate information to patient/family  - Incorporate patient and family knowledge, values, beliefs, and cultural backgrounds into the planning and delivery of care  - Encourage patient/family to participate in care and decision-making at the level they choose  - Honor patient and family perspectives and choices  Outcome: Progressing     Problem: Patient/Family Goals  Goal: Patient/Family Long Term Goal  Description: Patient's Long Term Goal: decrease pain     Interventions:  - take meds as directed   - See additional Care Plan goals for specific interventions  Outcome: Progressing  Goal: Patient/Family Short Term Goal  Description: Patient's Short Term Goal: go home     Interventions:   - complete test and follow plan of care   - See additional Care Plan goals for specific interventions  Outcome: Progressing     Problem: PAIN - ADULT  Goal: Verbalizes/displays adequate comfort level or patient's stated pain goal  Description: INTERVENTIONS:  - Encourage pt to monitor pain and request assistance  - Assess pain using appropriate pain scale  - Administer analgesics based on type and severity of pain and evaluate response  - Implement non-pharmacological measures as appropriate and evaluate response  - Consider cultural and social influences on pain and pain management  - Manage/alleviate anxiety  - Utilize distraction and/or relaxation techniques  - Monitor for opioid side effects  - Notify MD/LIP if  interventions unsuccessful or patient reports new pain  - Anticipate increased pain with activity and pre-medicate as appropriate  Outcome: Progressing     Problem: CARDIOVASCULAR - ADULT  Goal: Maintains optimal cardiac output and hemodynamic stability  Description: INTERVENTIONS:  - Monitor vital signs, rhythm, and trends  - Monitor for bleeding, hypotension and signs of decreased cardiac output  - Evaluate effectiveness of vasoactive medications to optimize hemodynamic stability  - Monitor arterial and/or venous puncture sites for bleeding and/or hematoma  - Assess quality of pulses, skin color and temperature  - Assess for signs of decreased coronary artery perfusion - ex. Angina  - Evaluate fluid balance, assess for edema, trend weights  Outcome: Progressing  Goal: Absence of cardiac arrhythmias or at baseline  Description: INTERVENTIONS:  - Continuous cardiac monitoring, monitor vital signs, obtain 12 lead EKG if indicated  - Evaluate effectiveness of antiarrhythmic and heart rate control medications as ordered  - Initiate emergency measures for life threatening arrhythmias  - Monitor electrolytes and administer replacement therapy as ordered  Outcome: Progressing     Problem: SAFETY ADULT - FALL  Goal: Free from fall injury  Description: INTERVENTIONS:  - Assess pt frequently for physical needs  - Identify cognitive and physical deficits and behaviors that affect risk of falls.  - [place] fall precautions as indicated by assessment.  - Educate pt/family on patient safety including physical limitations  - Instruct pt to call for assistance with activity based on assessment  - Modify environment to reduce risk of injury  - Provide assistive devices as appropriate  - Consider OT/PT consult to assist with strengthening/mobility  - Encourage toileting schedule  Outcome: Progressing     Problem: DISCHARGE PLANNING  Goal: Discharge to home or other facility with appropriate resources  Description:  INTERVENTIONS:  - Identify barriers to discharge w/pt and caregiver  - Include patient/family/discharge partner in discharge planning  - Arrange for needed discharge resources and transportation as appropriate  - Identify discharge learning needs (meds, wound care, etc)  - Arrange for interpreters to assist at discharge as needed  - Consider post-discharge preferences of patient/family/discharge partner  - Complete POLST form as appropriate  - Assess patient's ability to be responsible for managing their own health  - Refer to Case Management Department for coordinating discharge planning if the patient needs post-hospital services based on physician/LIP order or complex needs related to functional status, cognitive ability or social support system  Outcome: Progressing

## 2024-01-19 VITALS
BODY MASS INDEX: 21.23 KG/M2 | RESPIRATION RATE: 16 BRPM | OXYGEN SATURATION: 95 % | TEMPERATURE: 99 F | WEIGHT: 119.81 LBS | HEART RATE: 83 BPM | HEIGHT: 63 IN | DIASTOLIC BLOOD PRESSURE: 77 MMHG | SYSTOLIC BLOOD PRESSURE: 165 MMHG

## 2024-01-19 LAB
ANION GAP SERPL CALC-SCNC: 8 MMOL/L (ref 0–18)
BASOPHILS # BLD AUTO: 0.03 X10(3) UL (ref 0–0.2)
BASOPHILS NFR BLD AUTO: 0.4 %
BUN BLD-MCNC: 9 MG/DL (ref 9–23)
BUN/CREAT SERPL: 15 (ref 10–20)
CALCIUM BLD-MCNC: 8.5 MG/DL (ref 8.7–10.4)
CHLORIDE SERPL-SCNC: 109 MMOL/L (ref 98–112)
CO2 SERPL-SCNC: 23 MMOL/L (ref 21–32)
CREAT BLD-MCNC: 0.6 MG/DL
DEPRECATED RDW RBC AUTO: 47.6 FL (ref 35.1–46.3)
EGFRCR SERPLBLD CKD-EPI 2021: 85 ML/MIN/1.73M2 (ref 60–?)
EOSINOPHIL # BLD AUTO: 0.01 X10(3) UL (ref 0–0.7)
EOSINOPHIL NFR BLD AUTO: 0.1 %
ERYTHROCYTE [DISTWIDTH] IN BLOOD BY AUTOMATED COUNT: 13.8 % (ref 11–15)
GLUCOSE BLD-MCNC: 172 MG/DL (ref 70–99)
HCT VFR BLD AUTO: 36.1 %
HGB BLD-MCNC: 11.5 G/DL
IMM GRANULOCYTES # BLD AUTO: 0.03 X10(3) UL (ref 0–1)
IMM GRANULOCYTES NFR BLD: 0.4 %
LYMPHOCYTES # BLD AUTO: 1.08 X10(3) UL (ref 1–4)
LYMPHOCYTES NFR BLD AUTO: 14.8 %
MAGNESIUM SERPL-MCNC: 1.9 MG/DL (ref 1.6–2.6)
MCH RBC QN AUTO: 29.9 PG (ref 26–34)
MCHC RBC AUTO-ENTMCNC: 31.9 G/DL (ref 31–37)
MCV RBC AUTO: 94 FL
MONOCYTES # BLD AUTO: 0.67 X10(3) UL (ref 0.1–1)
MONOCYTES NFR BLD AUTO: 9.2 %
NEUTROPHILS # BLD AUTO: 5.47 X10 (3) UL (ref 1.5–7.7)
NEUTROPHILS # BLD AUTO: 5.47 X10(3) UL (ref 1.5–7.7)
NEUTROPHILS NFR BLD AUTO: 75.1 %
OSMOLALITY SERPL CALC.SUM OF ELEC: 293 MOSM/KG (ref 275–295)
PHOSPHATE SERPL-MCNC: 1.4 MG/DL (ref 2.4–5.1)
PLATELET # BLD AUTO: 184 10(3)UL (ref 150–450)
POTASSIUM SERPL-SCNC: 3.3 MMOL/L (ref 3.5–5.1)
POTASSIUM SERPL-SCNC: 3.9 MMOL/L (ref 3.5–5.1)
RBC # BLD AUTO: 3.84 X10(6)UL
SODIUM SERPL-SCNC: 140 MMOL/L (ref 136–145)
WBC # BLD AUTO: 7.3 X10(3) UL (ref 4–11)

## 2024-01-19 PROCEDURE — 99239 HOSP IP/OBS DSCHRG MGMT >30: CPT | Performed by: HOSPITALIST

## 2024-01-19 RX ORDER — HYDROCODONE BITARTRATE AND ACETAMINOPHEN 5; 325 MG/1; MG/1
1-2 TABLET ORAL EVERY 6 HOURS PRN
Qty: 10 TABLET | Refills: 0 | Status: SHIPPED | OUTPATIENT
Start: 2024-01-19 | End: 2024-01-24

## 2024-01-19 NOTE — PROGRESS NOTES
Progress Note  Christina Giraldo Patient Status:  Inpatient    1933 MRN [id number]   Location [facility] 3W/SW Attending Kayleen Graham MD   Hosp Day # 2 PCP Marce Smith MD     Subjective:  Pt denies any chest pain, SOB or dizziness  No tele event over night     Objective:  /71 (BP Location: Right arm)   Pulse 70   Temp 98.5 °F (36.9 °C) (Axillary)   Resp 16   Ht 5' 3\" (1.6 m)   Wt 119 lb 12.8 oz (54.3 kg)   SpO2 95%   BMI 21.22 kg/m²     Telemetry: NSR      Intake/Output:    Intake/Output Summary (Last 24 hours) at 2024 0836  Last data filed at 2024 0737  Gross per 24 hour   Intake 3350 ml   Output 950 ml   Net 2400 ml       Last 3 Weights   24 0340 119 lb 12.8 oz (54.3 kg)   24 0500 126 lb 4.8 oz (57.3 kg)   24 1708 114 lb 12.8 oz (52.1 kg)   24 1011 120 lb (54.4 kg)   24 1040 120 lb (54.4 kg)   23 0845 120 lb (54.4 kg)       Labs:  Recent Labs   Lab 24  1018 24  0700 24  0618   * 124* 172*   BUN 19 12 9   CREATSERUM 0.66 0.55 0.60   EGFRCR 83 87 85   CA 8.9 8.1* 8.5*    139 140   K 3.3* 3.6  3.6 3.3*    110 109   CO2 22.0 24.0 23.0     Recent Labs   Lab 24  1018 24  0700 24  0618   RBC 4.34 3.86 3.84   HGB 12.9 11.5* 11.5*   HCT 40.4 35.7 36.1   MCV 93.1 92.5 94.0   MCH 29.7 29.8 29.9   MCHC 31.9 32.2 31.9   RDW 13.9 14.2 13.8   NEPRELIM 2.91 8.57* 5.47   WBC 7.3 9.7 7.3   .0 162.0 184.0         Recent Labs   Lab 24  1018   TROPHS 3   CK 81     Lab Results   Component Value Date    INR 0.9 2022    INR 1.0 2012       Diagnostics:     Review of Systems   Respiratory: Negative.  Negative for shortness of breath.    Cardiovascular: Negative.  Negative for chest pain and palpitations.         Physical Exam:    Physical Exam  Vitals reviewed.   Constitutional:       General: She is not in acute distress.     Appearance: Normal appearance.   HENT:       Head: Normocephalic and atraumatic.   Cardiovascular:      Rate and Rhythm: Normal rate and regular rhythm.      Pulses: Normal pulses.           Radial pulses are 2+ on the right side and 2+ on the left side.        Dorsalis pedis pulses are 2+ on the right side and 2+ on the left side.      Heart sounds: S1 normal and S2 normal. No murmur heard.     No friction rub. No gallop.   Pulmonary:      Effort: Pulmonary effort is normal. No respiratory distress.      Breath sounds: Normal breath sounds. No wheezing or rales.   Abdominal:      General: Abdomen is flat. Bowel sounds are normal.      Palpations: Abdomen is soft.      Tenderness: There is no abdominal tenderness.   Musculoskeletal:      Cervical back: Normal range of motion and neck supple.      Right lower leg: No edema.      Left lower leg: No edema.   Skin:     General: Skin is warm and dry.   Neurological:      General: No focal deficit present.      Mental Status: She is alert and oriented to person, place, and time.   Psychiatric:         Mood and Affect: Mood normal.       Medications:   potassium phosphate dibasic 30 mmol in sodium chloride 0.9% 250 mL IVPB  30 mmol Intravenous Once    metoprolol tartrate  25 mg Oral 2x Daily(Beta Blocker)    methylPREDNISolone  40 mg Intravenous Q12H    levothyroxine  88 mcg Oral QAM AC    nitrofurantoin monohydrate macro  100 mg Oral Daily    famotidine  20 mg Oral Daily      sodium chloride Stopped (01/17/24 1218)    sodium chloride 100 mL/hr at 01/19/24 0339       Assessment/Plan:    Intractable N/V, back pain  -MRI: L4-L5 with severe central canal narrowing and mild to moderate bilateral foraminal narrowing  -management per primary    New Afib RVR, PAF  - Converted to NSR after dose of IV Cardizem.  - now on NSR  - echo with LVEF 55-60%, no RWMA  - CTA negative for PE  - no anticoagulation since pt had only a short run of AF. Pt want to differ it for now.   - 14 day monitor after discharge    HTN: elevated this  am  -possibly secondary to steroid    Dizziness, presyncope likely related to PO intake. Resolved now.    Hypokalemia  -Potassium of 3.3  - replace potassium per protocol    Plan:    -Elevated Blood pressure this am with 177/79 with HR 74- will give extra dose of metoprolol tartrate 12.5mg now.   -potassium and phosphate replacement per protocol  -Plan for 14 day MCT after discharge to assess recurrence of Afib  -no further inpatient cardiac work up needed at this time  - elevated blood pressure could be secondary to back pain. Pt educated on regular monitoring of heart rate and blood pressure at home and to log the information. Pt to bring these data during the follow up appointment.       CHRISTOPHER Adame  [facility]  1/19/2024  8:36 AM

## 2024-01-19 NOTE — PLAN OF CARE
Ivf, iv steroids . Tylenol for back pain with relief.  Plan: back to  [facility] with onsite PT today.    Problem: Patient Centered Care  Goal: Patient preferences are identified and integrated in the patient's plan of care  Description: Interventions:  - What would you like us to know as we care for you? I live at [facility]   - Provide timely, complete, and accurate information to patient/family  - Incorporate patient and family knowledge, values, beliefs, and cultural backgrounds into the planning and delivery of care  - Encourage patient/family to participate in care and decision-making at the level they choose  - Honor patient and family perspectives and choices  Outcome: Progressing     Problem: PAIN - ADULT  Goal: Verbalizes/displays adequate comfort level or patient's stated pain goal  Description: INTERVENTIONS:  - Encourage pt to monitor pain and request assistance  - Assess pain using appropriate pain scale  - Administer analgesics based on type and severity of pain and evaluate response  - Implement non-pharmacological measures as appropriate and evaluate response  - Consider cultural and social influences on pain and pain management  - Manage/alleviate anxiety  - Utilize distraction and/or relaxation techniques  - Monitor for opioid side effects  - Notify MD/LIP if interventions unsuccessful or patient reports new pain  - Anticipate increased pain with activity and pre-medicate as appropriate  Outcome: Progressing     Problem: CARDIOVASCULAR - ADULT  Goal: Maintains optimal cardiac output and hemodynamic stability  Description: INTERVENTIONS:  - Monitor vital signs, rhythm, and trends  - Monitor for bleeding, hypotension and signs of decreased cardiac output  - Evaluate effectiveness of vasoactive medications to optimize hemodynamic stability  - Monitor arterial and/or venous puncture sites for bleeding and/or hematoma  - Assess quality of pulses, skin color and temperature  - Assess  for signs of decreased coronary artery perfusion - ex. Angina  - Evaluate fluid balance, assess for edema, trend weights  Outcome: Progressing  Goal: Absence of cardiac arrhythmias or at baseline  Description: INTERVENTIONS:  - Continuous cardiac monitoring, monitor vital signs, obtain 12 lead EKG if indicated  - Evaluate effectiveness of antiarrhythmic and heart rate control medications as ordered  - Initiate emergency measures for life threatening arrhythmias  - Monitor electrolytes and administer replacement therapy as ordered  Outcome: Progressing     Problem: SAFETY ADULT - FALL  Goal: Free from fall injury  Description: INTERVENTIONS:  - Assess pt frequently for physical needs  - Identify cognitive and physical deficits and behaviors that affect risk of falls.  - [place] fall precautions as indicated by assessment.  - Educate pt/family on patient safety including physical limitations  - Instruct pt to call for assistance with activity based on assessment  - Modify environment to reduce risk of injury  - Provide assistive devices as appropriate  - Consider OT/PT consult to assist with strengthening/mobility  - Encourage toileting schedule  Outcome: Progressing     Problem: DISCHARGE PLANNING  Goal: Discharge to home or other facility with appropriate resources  Description: INTERVENTIONS:  - Identify barriers to discharge w/pt and caregiver  - Include patient/family/discharge partner in discharge planning  - Arrange for needed discharge resources and transportation as appropriate  - Identify discharge learning needs (meds, wound care, etc)  - Arrange for interpreters to assist at discharge as needed  - Consider post-discharge preferences of patient/family/discharge partner  - Complete POLST form as appropriate  - Assess patient's ability to be responsible for managing their own health  - Refer to Case Management Department for coordinating discharge planning if the patient needs post-hospital services based  on physician/LIP order or complex needs related to functional status, cognitive ability or social support system  Outcome: Progressing

## 2024-01-19 NOTE — DISCHARGE SUMMARY
[facility]     Discharge Summary    Christina Giraldo Patient Status:  Inpatient    1933 MRN [id number]   Location [facility] 3W/SW Attending Kayleen Graham MD   Hosp Day # 2 PCP Marce Smith MD     Date of Admission: 2024    Date of Discharge: 2024    Admitting Diagnosis: Hypokalemia [E87.6]  Atrial fibrillation with rapid ventricular response (HCC) [I48.91]  Sciatica, unspecified laterality [M54.30]  Nausea and vomiting, unspecified vomiting type [R11.2]    Discharge Diagnosis:   Patient Active Problem List   Diagnosis    Hypothyroidism, unspecified type    Recurrent UTI    H/O: hysterectomy    Factor V Leiden mutation ([facility])    Compression fracture    Anemia    Actinic keratosis    PAF (paroxysmal atrial fibrillation) ([facility])    Senile osteoporosis    Pathologic fracture of vertebrae    L3-4 stable>L4-5 unstable grade1 spondylolisthesis    L5-S1 left mod-large/right mod far lateral, L4-5 mod left & left foraminal, L3-4 mod diffuse, L2-3 left mild-mod foraminal, L1-2 mod diffuse bulging discs    L3-4 mild-mod, L4-5 mod central stenosis    Bilateral neck pain    Right hand weakness    bilateral C5-6 radiculopathies    C6-7 left mild-mod, C5-6 right mild-mod paracentral/right mod foraminal, C3-4 mild-mod central & right foraminal2-3 right mild paracentral bulging discs    C4-5 right mod HNP    C6-7 left mod foraminal, C4-5 mild central stenosis    Thoracic back pain    Other closed displaced fracture of proximal end of left humerus    Tear of left rotator cuff, unspecified tear extent    Vitamin D deficiency    Other closed intra-articular fracture of distal end of right radius, initial encounter    Other closed intra-articular fracture of distal end of right radius with malunion, subsequent encounter    Right wrist pain    SX/GLOBAL/  /EOSC/RIGHT EXTENSOR TENOYYSIS AND TENOSYNOVECTOMY 4TH EXTENSOR COMPARTMENT / DOS 17 / EXP 17    Ruptured  extensor tendon of hand or wrist, right, initial encounter    Chronic left-sided low back pain with left-sided sciatica    Chronic left sacroiliac joint pain    left L5 radiculopathy    L3-4 & L4-5 mild grade 1 spondylolisthesis that are mildly unstable    L5-S1 left mod-severe/right mild-mod foraminal stenosis    L5 chronic and L2 chronic compression fractures with L2 vertebral augmentation    T12 chronic compression fracture and s/p augmentation    Lumbar spinal stenosis    Lumbar radiculitis    Cramp of extremity    Gastroesophageal reflux disease without esophagitis    Hoarseness of voice    Protein-calorie malnutrition, unspecified severity (HCC)    Hypokalemia    Azotemia    Atrial fibrillation with rapid ventricular response (HCC)    Nausea and vomiting, unspecified vomiting type    Sciatica, unspecified laterality       Reason for Admission:     Afib with RVR    Physical Exam:     GENERAL:  Alert and oriented to time, place, and person.  Tremulous, moderate distress secondary to her back pain.  HEENT:  Atraumatic.  Oropharynx clear.  Dry mucous membranes.  Normal hard and soft palate.  Eyes:  Anicteric sclerae.   NECK:  Supple.  No lymphadenopathy.  Trachea midline.  Full range of motion.  LUNGS:  Clear to auscultation bilaterally.  Normal respiratory effort.    HEART:  Regular rate, rhythm.  S1 and S2 auscultated.  No murmur.   ABDOMEN:  Soft, nondistended.  No tenderness.  Positive bowel sounds.    EXTREMITIES:  No edema, clubbing, or cyanosis.  NEUROLOGIC:  Motor and sensory intact.  Did not perform straight leg raising maneuver because of her intractable pain.    Hospital Course:     Atrial fibrillation with rapid ventricular response (HCC)  -Echo reviewed  -Patient Cardiology recommendations  -Monitor on telemetry     Intractable lumbar radiculopathy  -MRI has been reviewed  -Shows findings that are consistent with chronic fractures and severe canal stenosis at L4-L5  -Continue IV analgesics  -Will  appreciate recommendations from pain service       Hypokalemia  -Continue electrolyte replacement protocol  -Continue monitor       Nausea and vomiting, unspecified vomiting type  -continue to monitor  -continue IV antiemetics PRN     VTE ppx: SCD    S/P EPIDURAL STEROID LUMBAR INJECTION    History of Present Illness:     Per admitting tea:    The patient is a 89-year-old  female with chronic back pain, underlying degenerative joint disease of lumbar spine, who presented today to the emergency department for evaluation after she had intractable nausea, vomiting, after she took 2 doses of tramadol at home prescribed by her primary care physician for her back pain.  Upon arrival to the emergency room, she was in atrial fibrillation, rapid ventricular response, 130 to 150, which is new for her.  Pulse ox also was 89% on room air.  She was given IV Cardizem and IV fluids, and she converted to sinus rhythm.  Chemistry showed potassium 3.3.  Otherwise, chemistry, CBC, magnesium, TSH, troponin, and CK were negative.  Urinalysis showed no evidence of urinary tract infection.  CT scan of the abdomen and pelvis, CT scan of the chest, and CT scan of the cervical spine and brain were negative.  Chest x-ray showed no acute findings.       Disposition: Home or Self Care    Discharge Condition: Good    Discharge Medications:   Current Discharge Medication List        START taking these medications    Details   metoprolol tartrate 25 MG Oral Tab Take 1 tablet (25 mg total) by mouth 2x Daily(Beta Blocker).  Qty: 60 tablet, Refills: 3           CONTINUE these medications which have CHANGED    Details   HYDROcodone-acetaminophen 5-325 MG Oral Tab Take 1-2 tablets by mouth every 6 (six) hours as needed for Pain.  Qty: 10 tablet, Refills: 0    Associated Diagnoses: Compression fracture of L1 vertebra with nonunion, subsequent encounter           CONTINUE these medications which have NOT CHANGED    Details   nitrofurantoin  monohydrate macro 100 MG Oral Cap Take 1 capsule (100 mg total) by mouth daily.      levothyroxine 75 MCG Oral Tab Take 1 tablet (75 mcg total) by mouth every morning before breakfast.  Qty: 90 tablet, Refills: 3    Associated Diagnoses: Hypothyroidism, unspecified type      famotidine 20 MG Oral Tab Take 1 tablet (20 mg total) by mouth 2 (two) times daily.  Qty: 180 tablet, Refills: 3      acetaminophen 500 MG Oral Tab Take 2 tablets (1,000 mg total) by mouth every 6 (six) hours as needed for Pain.      ondansetron 4 MG Oral Tablet Dispersible Take 1 tablet (4 mg total) by mouth every 8 (eight) hours as needed for Nausea.  Qty: 5 tablet, Refills: 3      cetirizine (ZYRTEC ALLERGY) 10 MG Oral Tab Take 1 tablet by mouth daily as needed for Allergies.  Qty: 15 tablet, Refills: 11      Polyethylene Glycol 3350 (MIRALAX OR) Take by mouth as needed.      Denosumab 60 MG/ML Subcutaneous Solution Prefilled Syringe Inject 1 mL (60 mg total) into the skin once. Historical documentation - see Epic Immunization Activity for administration details  Qty: 1 mL, Refills: 0    Associated Diagnoses: Senile osteoporosis      Estradiol (VAGIFEM) 10 MCG Vaginal Tab Place 10 mcg vaginally 3 (three) times a week.  Qty: 48 tablet, Refills: 3    Associated Diagnoses: Recurrent UTI             Total dc time > 30 min    Kayleen Graham MD  1/19/2024  11:26 AM     Hospital Discharge Diagnoses:  Afib with RVR     Lace+ Score: 49  59-90 High Risk  29-58 Medium Risk  0-28   Low Risk.    TCM Follow-Up Recommendation:  LACE 29-58: Moderate Risk of readmission after discharge from the hospital.

## 2024-01-19 NOTE — CM/SW NOTE
01/19/24 1200   Discharge disposition   Expected discharge disposition Home or Self  ([facility])   Outpatient services   (onsite PT at [facility])   Discharge transportation Private car       Per chart, pt has DC order for today. QUIRINO requested MD enter outpatient PT script/order for pt.    Patricia barreto/ [facility] notified via Vmail of pt's intended return today.    Pt is cleared from SW/GABRIEL stand point.       PLAN: [facility] w/ onsite PT           Sondra Velez, MSW, LSW [phone]

## 2024-01-19 NOTE — PLAN OF CARE
Patient medically cleared to go by team. Discharge instructions reviewed with pt at bedside along with daughter. Patient discharged with all personal belongings. IV and tele monitor removed.    Problem: Patient Centered Care  Goal: Patient preferences are identified and integrated in the patient's plan of care  Description: Interventions:  - What would you like us to know as we care for you? I live at [facility]   - Provide timely, complete, and accurate information to patient/family  - Incorporate patient and family knowledge, values, beliefs, and cultural backgrounds into the planning and delivery of care  - Encourage patient/family to participate in care and decision-making at the level they choose  - Honor patient and family perspectives and choices  Outcome: Completed     Problem: Patient/Family Goals  Goal: Patient/Family Long Term Goal  Description: Patient's Long Term Goal: decrease pain     Interventions:  - take meds as directed   - See additional Care Plan goals for specific interventions  Outcome: Completed  Goal: Patient/Family Short Term Goal  Description: Patient's Short Term Goal: go home     Interventions:   - complete test and follow plan of care   - See additional Care Plan goals for specific interventions  Outcome: Completed     Problem: PAIN - ADULT  Goal: Verbalizes/displays adequate comfort level or patient's stated pain goal  Description: INTERVENTIONS:  - Encourage pt to monitor pain and request assistance  - Assess pain using appropriate pain scale  - Administer analgesics based on type and severity of pain and evaluate response  - Implement non-pharmacological measures as appropriate and evaluate response  - Consider cultural and social influences on pain and pain management  - Manage/alleviate anxiety  - Utilize distraction and/or relaxation techniques  - Monitor for opioid side effects  - Notify MD/LIP if interventions unsuccessful or patient reports new pain  - Anticipate increased pain  with activity and pre-medicate as appropriate  Outcome: Completed

## 2024-01-19 NOTE — CHRONIC PAIN
[facility]  Report of Consultation    Christina Giraldo Patient Status:  Inpatient    1933 MRN [id number]   Location [facility] 3W/SW Attending Kayleen Graham MD   Hosp Day # 2 PCP Marce Smith MD     Date of Admission:  2024  Date of Consult:  2024    Reason for Consultation:   1. 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 Atrial fibrillation with rapid ventricular response (HCC)    3. Hypokalemia    4. Nausea and vomiting, unspecified vomiting type    5. Sciatica, unspecified laterality    6. L5-S1 left mod-severe/right mild-mod foraminal stenosis      Interval history:  Doing well after RADHA yesterday, starting to notice improvement.     LBP with neurogenic claudication  Spinal stenosis severe L4/5  History of Present Illness:  Christina Giraldo is a a(n) 89 year old female with hx of fall@ 4 months ago resulting in wrist fx and pelvic fx. Hx of Comp Fx T12 and L2 s/p kyphoplasty.  Patient states she continued with LBP and R>L leg radicular pain attempted PT naprosyn Advil Tylenol gabapentin and then Tramadol which made her sick with N&V which brought her to the hospital. Today she is still c/o LBP with activity /neurogenetic claudication but is comfortable at rest. No persistent numbness or change in bowel or bladder.    History:  Past Medical History:   Diagnosis Date    Blood disorder     Cataract     Disorder of thyroid     Factor V Leiden mutation (HCC)     High cholesterol     Osteopenia     OTHER DISEASES     pancreatitis X 2    Problems with swallowing     Recurrent urinary tract infection      Past Surgical History:   Procedure Laterality Date    CATARACT      CHOLECYSTECTOMY      because of pancreatic pseudocyst which was also removed.    COLONOSCOPY       - had upper and lower endoscopy; ; Dr Lagunas because of anemia - normal.    EXCISIONAL BIOSPY RIGHT      HYSTERECTOMY      ORA/BSO in      IR VERTEBROPLASTY            OTHER SURGICAL HISTORY  11    juanita gerard     Family History   Problem Relation Age of Onset    Other (Other) Father         cva    Cancer Mother 80        breast cancer    Breast Cancer Mother 80        80    Cancer Other         breast    Breast Cancer Maternal Aunt 82        82      reports that she has never smoked. She has never used smokeless tobacco. She reports that she does not drink alcohol and does not use drugs.    Allergies:  Allergies   Allergen Reactions    Cephalexin OTHER (SEE COMMENTS)     Pt does not remember.     Codeine [Opioid Analgesics] NAUSEA AND VOMITING    Etodolac OTHER (SEE COMMENTS)    Morphine NAUSEA AND VOMITING    Terbinafine OTHER (SEE COMMENTS)     Medication was not working.    Tramadol NAUSEA AND VOMITING    Ciprofloxacin DIZZINESS    Lamisil DIARRHEA    Omeprazole NAUSEA AND VOMITING       Medications:    Current Facility-Administered Medications:     metoprolol tartrate (Lopressor) tab 25 mg, 25 mg, Oral, 2x Daily(Beta Blocker)    [COMPLETED] sodium chloride 0.9 % IV bolus 500 mL, 500 mL, Intravenous, Once **FOLLOWED BY** sodium chloride 0.9% infusion, , Intravenous, Continuous    sodium chloride 0.9% infusion, , Intravenous, Continuous    acetaminophen (Tylenol Extra Strength) tab 500 mg, 500 mg, Oral, Q4H PRN    ondansetron (Zofran) 4 MG/2ML injection 4 mg, 4 mg, Intravenous, Q6H PRN    metoclopramide (Reglan) 5 mg/mL injection 10 mg, 10 mg, Intravenous, Q8H PRN    HYDROmorphone (Dilaudid) 1 MG/ML injection 0.2 mg, 0.2 mg, Intravenous, Q2H PRN **OR** HYDROmorphone (Dilaudid) 1 MG/ML injection 0.4 mg, 0.4 mg, Intravenous, Q2H PRN **OR** HYDROmorphone (Dilaudid) 1 MG/ML injection 0.8 mg, 0.8 mg, Intravenous, Q2H PRN    methylPREDNISolone sodium succinate (Solu-MEDROL) injection 40 mg, 40 mg, Intravenous, Q12H    HYDROcodone-acetaminophen (Norco) 5-325 MG per tab 1 tablet, 1 tablet, Oral, Q6H PRN    levothyroxine (Synthroid) tab  88 mcg, 88 mcg, Oral, QAM AC    nitrofurantoin monohydrate macro (Macrobid) cap 100 mg, 100 mg, Oral, Daily    famotidine (Pepcid) tab 20 mg, 20 mg, Oral, Daily  Scheduled Meds:   metoprolol tartrate  25 mg Oral 2x Daily(Beta Blocker)    methylPREDNISolone  40 mg Intravenous Q12H    levothyroxine  88 mcg Oral QAM AC    nitrofurantoin monohydrate macro  100 mg Oral Daily    famotidine  20 mg Oral Daily     Continuous Infusions:   sodium chloride Stopped (01/17/24 1218)    sodium chloride Stopped (01/19/24 1204)     PRN Meds:.acetaminophen, ondansetron, metoclopramide, HYDROmorphone **OR** HYDROmorphone **OR** HYDROmorphone, HYDROcodone-acetaminophen    Review of Systems:  Pertinent items are noted in HPI.    Physical Exam:  Blood pressure (!) 170/68, pulse 71, temperature 98.5 °F (36.9 °C), temperature source Axillary, resp. rate 16, height 5' 3\" (1.6 m), weight 119 lb 12.8 oz (54.3 kg), SpO2 95%, not currently breastfeeding.  General: Alert and oriented x3, NAD, appears stated age, appropriate disposition and demeanor, answers questions appropriately appears to be comfortable in chair  Head: normocephalic, atraumatic  Eyes: anicteric; no injection  Ears: no obvious deformities noted   Nose: externally grossly within normal limits, no unusual discharge or rhinorrhea   Throat: lips grossly within normal limits by visual exam externally  Neck: supple, trachea midline, no obvious JVD  Chest: no obvious visual deformity  Respiratory: Non labored   SLR;' sitting neg  Laboratory Data:  Lab Results   Component Value Date    WBC 7.3 01/19/2024    HGB 11.5 01/19/2024    HCT 36.1 01/19/2024    .0 01/19/2024    CREATSERUM 0.60 01/19/2024    BUN 9 01/19/2024     01/19/2024    K 3.3 01/19/2024     01/19/2024    CO2 23.0 01/19/2024     01/19/2024    CA 8.5 01/19/2024    MG 1.9 01/19/2024    PHOS 1.4 01/19/2024       Imaging:  Narrative   PROCEDURE: MRI SPINE LUMBAR (CPT=72148)     COMPARISON: [facility]  [facility], CT ABDOMEN PELVIS IV CONTRAST NO ORAL (ER), 1/17/2024, 1:45 PM.  [facility], MRI SPINE LUMBAR (CPT=72148), 2/24/2017, 11:20 AM.     INDICATIONS: lumbar radiculopathy     TECHNIQUE: A variety of imaging planes and parameters were utilized for visualization of suspected pathology.     FINDINGS:  NUMERATION: For the purposes of this examination, the lowest fully formed disc space is designated L5-S1.    BONES: Moderate chronic fracture deformity T12 status post kyphoplasty.  Mild-to-moderate chronic fracture deformity L2 status post kyphoplasty.  Moderate chronic appearing fracture deformity L5.  No acute fracture or osseous malalignment.  Multilevel  degenerative change with marginal osteophyte formation and facet hypertrophy.  Bone marrow signal is otherwise unremarkable.  CORD/CAUDA EQUINA: Normal caliber, contour, and signal intensity.  The conus terminates at T12.  PARASPINAL AREA: Unremarkable.    OTHER: Bilateral small T2 hyperintense foci in the kidneys incompletely characterized but probably representing small cysts.  Nonspecific urinary bladder distension.     LUMBAR DISC LEVELS:  L1-L2: Circumferential disc bulge with small central superimposed protrusion.  There is ligamentum flavum hypertrophy.  Mild central canal narrowing.  Moderate bilateral foraminal narrowing noted with facet arthropathy.  Similar findings were seen  previously.  L2-L3: Circumferential disc bulge with ligamentum flavum hypertrophy.  Mild central canal narrowing.  Moderate bilateral foraminal narrowing right greater than left with facet arthropathy.  Overall findings are mildly progressed.  L3-L4: Circumferential disc bulge with ligamentum flavum hypertrophy.  There is mild-to-moderate central canal narrowing mildly progressed.  Mild bilateral foraminal narrowing with facet arthropathy.  L4-L5: Circumferential disc bulge with ligamentum flavum hypertrophy.  Severe central canal narrowing.   This is progressed since the prior study.  Mild-to-moderate bilateral foraminal narrowing with facet arthropathy.  L5-S1: Circumferential disc bulge with ligamentum flavum hypertrophy.  Moderate central canal narrowing is mildly progressed.  Left greater than right bilateral moderate foraminal narrowing facet arthropathy.               Impression   CONCLUSION:     Chronic T12, L2 and L5 vertebral compression deformity status post T12 and L2 kyphoplasties.  No acute fracture or osseous malalignment.     Mildly progressed multilevel lumbar spine degenerative changes described above.  The most significant level represents L4-L5 where there is severe central canal narrowing and mild-to-moderate bilateral foraminal narrowing.     Lesser incidental findings as above.     A preliminary report was issued by the [facility] teleradiology service. There are no major discrepancies.           Dictated by (CST): Jose Maria Murray MD on 1/18/2024 at 6:14 AM      Finalized by (CST): Jose Maria Murray MD on 1/18/2024 at 6:23 AM           Impression:  Patient Active Problem List   Diagnosis    Hypothyroidism, unspecified type    Recurrent UTI    H/O: hysterectomy    Factor V Leiden mutation ([facility])    Compression fracture    Anemia    Actinic keratosis    PAF (paroxysmal atrial fibrillation) ([facility])    Senile osteoporosis    Pathologic fracture of vertebrae    L3-4 stable>L4-5 unstable grade1 spondylolisthesis    L5-S1 left mod-large/right mod far lateral, L4-5 mod left & left foraminal, L3-4 mod diffuse, L2-3 left mild-mod foraminal, L1-2 mod diffuse bulging discs    L3-4 mild-mod, L4-5 mod central stenosis    Bilateral neck pain    Right hand weakness    bilateral C5-6 radiculopathies    C6-7 left mild-mod, C5-6 right mild-mod paracentral/right mod foraminal, C3-4 mild-mod central & right foraminal2-3 right mild paracentral bulging discs    C4-5 right mod HNP    C6-7 left mod foraminal, C4-5 mild central stenosis    Thoracic back  pain    Other closed displaced fracture of proximal end of left humerus    Tear of left rotator cuff, unspecified tear extent    Vitamin D deficiency    Other closed intra-articular fracture of distal end of right radius, initial encounter    Other closed intra-articular fracture of distal end of right radius with malunion, subsequent encounter    Right wrist pain    SX/GLOBAL/  /EOSC/RIGHT EXTENSOR TENOYYSIS AND TENOSYNOVECTOMY 4TH EXTENSOR COMPARTMENT / DOS 01/17/17 / EXP 04/17/17    Ruptured extensor tendon of hand or wrist, right, initial encounter    Chronic left-sided low back pain with left-sided sciatica    Chronic left sacroiliac joint pain    left L5 radiculopathy    L3-4 & L4-5 mild grade 1 spondylolisthesis that are mildly unstable    L5-S1 left mod-severe/right mild-mod foraminal stenosis    L5 chronic and L2 chronic compression fractures with L2 vertebral augmentation    T12 chronic compression fracture and s/p augmentation    Lumbar spinal stenosis    Lumbar radiculitis    Cramp of extremity    Gastroesophageal reflux disease without esophagitis    Hoarseness of voice    Protein-calorie malnutrition, unspecified severity (HCC)    Hypokalemia    Azotemia    Atrial fibrillation with rapid ventricular response (HCC)    Nausea and vomiting, unspecified vomiting type    Sciatica, unspecified laterality           Recommendations:    - s/p LESI with good response  - Outpatient follow up in pain clinic [facility] after discharge.   - Pain service will sign off please call with questions.     I have informed Christina Giraldo  of the risks of neuraxial injection including, but not limited to: failure, headache, backache, spinal, unilateral/patchy block, difficulty breathing, infection, bleeding, nerve damage, paralysis, death. The patient desires the proposed neuraxial injection as planned.        Comprehensive analgesic plan was formulated. Conservative vs. Aggressive measures were discussed at length  including pharmacotherapy (eg. Anti- inflammatories, muscle relaxants, neuropathic medications, oral steroids, analgesics), injections, and further testing. Risks and benefits of all options were discussed at length to patients satisfaction during a comprehensive interactive discussion. All questions were answered during extended questions and answer session. Patient agreeable to discussion plan. Greater than 50% of the time was spent with counseling (nature of discussion centered around pain, therapy, and treatment options), face to face time, time spent reviewing data, obtaining patient information and discussing the care with the patients health care providers.     Thank you for allowing me to participate in the care of your patient.    Rojelio Dominguez MD, 01/19/24, 1:29 PM

## 2024-01-19 NOTE — PROGRESS NOTES
Health system - CARDIOLOGY PROGRESS NOTE      Christina Giraldo Patient Status:  Inpatient    1933 MRN [id number]   Location Health system 3W/SW Attending Kayleen Graham MD   Hosp Day # 2 PCP Marce Smith MD       Subjective:   ***    Objective:   Blood pressure (!) 164/66, pulse 70, temperature 98.5 °F (36.9 °C), temperature source Axillary, resp. rate 16, height 5' 3\" (1.6 m), weight 119 lb 12.8 oz (54.3 kg), SpO2 95%, not currently breastfeeding.    Exam  Gen: No acute distress, alert and oriented   Neck:supple,no JVD  Pulm: Lungs ok, normal respiratory effort  CV: Heart with regular rate and rhythm,no pathologic murmur  Abd: Abdomen soft, nontender, nondistended,  bowel sounds present  Ext:  no clubbing, no cyanosis  Neuro: no focal deficits  Skin: no rashes or lesions      Scheduled Meds:    potassium phosphate dibasic 30 mmol in sodium chloride 0.9% 250 mL IVPB  30 mmol Intravenous Once    metoprolol tartrate  25 mg Oral 2x Daily(Beta Blocker)    methylPREDNISolone  40 mg Intravenous Q12H    levothyroxine  88 mcg Oral QAM AC    nitrofurantoin monohydrate macro  100 mg Oral Daily    famotidine  20 mg Oral Daily       Continuous Infusions:    sodium chloride Stopped (24 1218)    sodium chloride 100 mL/hr at 24 0339       Results:     Lab Results   Component Value Date    WBC 7.3 2024    HGB 11.5 (L) 2024    HCT 36.1 2024    .0 2024    CREATSERUM 0.60 2024    BUN 9 2024     2024    K 3.3 (L) 2024     2024    CO2 23.0 2024     (H) 2024    CA 8.5 (L) 2024    ALB 4.2 2024    ALKPHO 38 (L) 2024    BILT 0.8 2024    TP 6.4 2024    AST 20 2024    ALT 27 2024    PTT 22.4 (L) 2022    INR 0.9 2022    T4F 1.49 2021    TSH 1.858 2024    DDIMER 1.34 (H) 2024    ESRML 12 2020    CRP 0.64 (H) 2020    MG 1.9  01/19/2024    PHOS 1.4 (L) 01/19/2024    CK 81 01/17/2024    B12 766 07/17/2017       XR PAIN CLINIC FLUOROSCOPY - N/C    Result Date: 1/18/2024  CONCLUSION: Procedural fluoroscopy    Dictated by (CST): Gabino Putnam MD on 1/18/2024 at 3:27 PM     Finalized by (CST): Gabino Putnam MD on 1/18/2024 at 3:28 PM          MRI SPINE LUMBAR (CPT=72148)    Result Date: 1/18/2024  CONCLUSION:   Chronic T12, L2 and L5 vertebral compression deformity status post T12 and L2 kyphoplasties.  No acute fracture or osseous malalignment.  Mildly progressed multilevel lumbar spine degenerative changes described above.  The most significant level represents L4-L5 where there is severe central canal narrowing and mild-to-moderate bilateral foraminal narrowing.  Lesser incidental findings as above.  A preliminary report was issued by the [facility] teleradiology service. There are no major discrepancies.    Dictated by (CST): Jose Maria Murray MD on 1/18/2024 at 6:14 AM     Finalized by (CST): Jose Maria Murray MD on 1/18/2024 at 6:23 AM          CT ABDOMEN PELVIS IV CONTRAST, NO ORAL (ER)    Result Date: 1/17/2024  CONCLUSION:   No evidence of acute abnormality in the abdomen or pelvis.  Cholecystectomy.  Mild biliary ductal prominence is nonspecific but may relate to prior cholecystectomy.  Advise biliary laboratory correlation and consider follow-up MRCP as clinically warranted.  Colonic diverticulosis.  Hysterectomy.  Lesser incidental findings as above.    Dictated by (CST): Jose Maria Murray MD on 1/17/2024 at 2:14 PM     Finalized by (CST): Jose Maria Murray MD on 1/17/2024 at 2:23 PM          CT CHEST PE AORTA (IV ONLY) (CPT=71260)    Result Date: 1/17/2024  CONCLUSION: No PE    Dictated by (CST): Gabnio Putnam MD on 1/17/2024 at 2:08 PM     Finalized by (CST): Gabino Putnam MD on 1/17/2024 at 2:21 PM          CT SPINE CERVICAL (CPT=72125)    Result Date: 1/17/2024  CONCLUSION: No fracture    Dictated by (CST): Gabino Putnam,  MD on 1/17/2024 at 2:06 PM     Finalized by (CST): Gabino Putnam MD on 1/17/2024 at 2:08 PM          CT BRAIN OR HEAD (33442)    Result Date: 1/17/2024  CONCLUSION: No traumatic finding    Dictated by (CST): Gabino Putnam MD on 1/17/2024 at 2:03 PM     Finalized by (CST): Gabino Putnam MD on 1/17/2024 at 2:05 PM          XR CHEST AP PORTABLE  (CPT=71045)    Result Date: 1/17/2024  CONCLUSION:  1. No acute airspace disease.  Suboptimal inspiration.  Mild bibasilar scarring/atelectasis.    Dictated by (CST): Lion Bowser MD on 1/17/2024 at 12:01 PM     Finalized by (CST): Lion Bowser MD on 1/17/2024 at 12:03 PM         EKG 12 Lead    Result Date: 1/17/2024  Normal sinus rhythm Left axis deviation Abnormal ECG When compared with ECG of 17-JAN-2024 10:32, Sinus rhythm has replaced Atrial fibrillation ST no longer depressed in Inferior leads ST no longer depressed in Anterior-lateral leads Confirmed by MARJORIE DOMINGO JORDAN ([id number]) on 1/17/2024 4:28:39 PM    EKG 12 Lead    Result Date: 1/17/2024  Atrial fibrillation with rapid ventricular response Marked ST abnormality, possible inferior subendocardial injury Abnormal ECG When compared with ECG of 17-JAN-2024 10:12, No significant change was found Confirmed by MARJORIE DOMINGO JORDAN ([id number]) on 1/17/2024 4:18:22 PM    EKG 12 Lead    Result Date: 1/17/2024  Atrial fibrillation with rapid ventricular response Nonspecific ST abnormality Abnormal ECG No previous ECGs found in [facility] Confirmed by MARJORIE DOMINGO JORDAN ([id number]) on 1/17/2024 4:17:49 PM       Assessment and Plan:   ***      Que Trujillo MD

## 2024-02-19 ENCOUNTER — OFFICE VISIT (OUTPATIENT)
Dept: PAIN CLINIC | Facility: HOSPITAL | Age: 89
End: 2024-02-19
Attending: ANESTHESIOLOGY
Payer: MEDICARE

## 2024-02-19 VITALS
SYSTOLIC BLOOD PRESSURE: 180 MMHG | DIASTOLIC BLOOD PRESSURE: 73 MMHG | RESPIRATION RATE: 18 BRPM | HEART RATE: 80 BPM | HEIGHT: 63 IN | BODY MASS INDEX: 20.02 KG/M2 | WEIGHT: 113 LBS

## 2024-02-19 DIAGNOSIS — M54.16 LUMBAR RADICULOPATHY: ICD-10-CM

## 2024-02-19 DIAGNOSIS — M48.062 SPINAL STENOSIS OF LUMBAR REGION WITH NEUROGENIC CLAUDICATION: ICD-10-CM

## 2024-02-19 DIAGNOSIS — M51.37 DEGENERATION OF LUMBAR OR LUMBOSACRAL INTERVERTEBRAL DISC: ICD-10-CM

## 2024-02-19 DIAGNOSIS — M48.061 LUMBAR FORAMINAL STENOSIS: ICD-10-CM

## 2024-02-19 DIAGNOSIS — S32.000S COMPRESSION FRACTURE OF LUMBAR VERTEBRA, UNSPECIFIED LUMBAR VERTEBRAL LEVEL, SEQUELA: ICD-10-CM

## 2024-02-19 DIAGNOSIS — M54.16 LUMBAR BACK PAIN WITH RADICULOPATHY AFFECTING LEFT LOWER EXTREMITY: Primary | ICD-10-CM

## 2024-02-19 PROCEDURE — 99202 OFFICE O/P NEW SF 15 MIN: CPT

## 2024-02-19 NOTE — PROGRESS NOTES
2/19/2024-presents ambulatory use her cane; NEW CONSULT C/O TEX. BUTTOCK RADIATING DOWN  BLE; ALSO PAIN IN THE BILAT CALF'S; TODAY PAIN 5/10; post injection she is very happy with the results; but she reports she is still having tex. Buttock and bilat calf pain; examined by Dr. Young; refer to documentation for the plan of care.

## 2024-02-19 NOTE — CHRONIC PAIN
Follow-up Note    HISTORY OF PRESENT ILLNESS:  Christina Giraldo is a 89 year old old female, originally referred to the pain clinic during her hospitalization in January 18, 2024 at which time she underwent a lumbar epidural steroid injection interlaminar at L4-5 for low back pain and neurogenic claudication.  She now presents for follow-up in the pain clinic reporting at least 50% relief of her lower back symptoms and right leg pain but still has persistent left radicular leg pain with ambulation.  1. Lumbar back pain with radiculopathy affecting left lower extremity    2. left L5 radiculopathy    3. L5-S1 left mod-large/right mod far lateral, L4-5 mod left & left foraminal, L3-4 mod diffuse, L2-3 left mild-mod foraminal, L1-2 mod diffuse bulging discs    4. L5-S1 left mod-severe/right mild-mod foraminal stenosis    5. Spinal stenosis of lumbar region with neurogenic claudication    6. Compression fracture of lumbar vertebra, unspecified lumbar vertebral level, sequela    Overall the patient feels that she is doing much better but this left leg pain seems to inhibit her activities of daily living.. The pain radiates into the left lower extremity all the way to the foot and ankle.  No recent injury/trauma.   Pt denies persistent numbness/tingling/weakness.  There is no incontinence of bowel/bladder. Coughing/sneezing/straining does not exacerbate the pain.       ALLERGIES:  Allergies   Allergen Reactions    Cephalexin OTHER (SEE COMMENTS)     Pt does not remember.     Codeine [Opioid Analgesics] NAUSEA AND VOMITING    Etodolac OTHER (SEE COMMENTS)    Morphine NAUSEA AND VOMITING    Terbinafine OTHER (SEE COMMENTS)     Medication was not working.    Tramadol NAUSEA AND VOMITING    Ciprofloxacin DIZZINESS    Lamisil DIARRHEA    Omeprazole NAUSEA AND VOMITING       MEDICATION LIST:  Current Outpatient Medications   Medication Sig Dispense Refill    metoprolol tartrate 25 MG Oral Tab Take 1 tablet (25 mg total) by  mouth 2x Daily(Beta Blocker). 60 tablet 3    nitrofurantoin monohydrate macro 100 MG Oral Cap Take 1 capsule (100 mg total) by mouth daily.      Polyethylene Glycol 3350 (MIRALAX OR) Take by mouth as needed.      levothyroxine 75 MCG Oral Tab Take 1 tablet (75 mcg total) by mouth every morning before breakfast. (Patient taking differently: Take 88 mcg by mouth every morning before breakfast.) 90 tablet 3    famotidine 20 MG Oral Tab Take 1 tablet (20 mg total) by mouth 2 (two) times daily. 180 tablet 3    acetaminophen 500 MG Oral Tab Take 2 tablets (1,000 mg total) by mouth every 6 (six) hours as needed for Pain.      Denosumab 60 MG/ML Subcutaneous Solution Prefilled Syringe Inject 1 mL (60 mg total) into the skin once. Historical documentation - see Epic Immunization Activity for administration details 1 mL 0    Estradiol (VAGIFEM) 10 MCG Vaginal Tab Place 10 mcg vaginally 3 (three) times a week. 48 tablet 3    ondansetron 4 MG Oral Tablet Dispersible Take 1 tablet (4 mg total) by mouth every 8 (eight) hours as needed for Nausea. 5 tablet 3    cetirizine (ZYRTEC ALLERGY) 10 MG Oral Tab Take 1 tablet by mouth daily as needed for Allergies. 15 tablet 11      REVIEW OF SYSTEMS:   Bowel/Bladder Incontinence: none  Coughing/sneezing/straining does not exacerbate the pain.  Numbness/tingling: as above  Weakness: as above  Weight Loss: Negative   Fever: Negative   Cardiovascular:  No current chest pain or palpitations   Respiratory:  No current shortness of breath   Gastrointestinal:  No active ulcer  Genitourinary:  Negative  Integumentary :  Negative  Psychiatric:  Negative  Hematologic: No active bleeding  Lymphatic: No current lymphedema  Allergic/Immunologic:  Negative  Musculoskeletal: As above  Neurological: As above  Denies chest pain, shortness of breath.    MEDICAL HISTORY:  Patient Active Problem List   Diagnosis    Hypothyroidism, unspecified type    Recurrent UTI    H/O: hysterectomy    Factor V Leiden  mutation (HCC)    Compression fracture    Anemia    Actinic keratosis    PAF (paroxysmal atrial fibrillation) (HCC)    Senile osteoporosis    Pathologic fracture of vertebrae    L3-4 stable>L4-5 unstable grade1 spondylolisthesis    L5-S1 left mod-large/right mod far lateral, L4-5 mod left & left foraminal, L3-4 mod diffuse, L2-3 left mild-mod foraminal, L1-2 mod diffuse bulging discs    L3-4 mild-mod, L4-5 mod central stenosis    Bilateral neck pain    Right hand weakness    bilateral C5-6 radiculopathies    C6-7 left mild-mod, C5-6 right mild-mod paracentral/right mod foraminal, C3-4 mild-mod central & right foraminal2-3 right mild paracentral bulging discs    C4-5 right mod HNP    C6-7 left mod foraminal, C4-5 mild central stenosis    Thoracic back pain    Other closed displaced fracture of proximal end of left humerus    Tear of left rotator cuff, unspecified tear extent    Vitamin D deficiency    Other closed intra-articular fracture of distal end of right radius, initial encounter    Other closed intra-articular fracture of distal end of right radius with malunion, subsequent encounter    Right wrist pain    SX/GLOBAL/  /EOSC/RIGHT EXTENSOR TENOYYSIS AND TENOSYNOVECTOMY 4TH EXTENSOR COMPARTMENT / DOS 01/17/17 / EXP 04/17/17    Ruptured extensor tendon of hand or wrist, right, initial encounter    Chronic left-sided low back pain with left-sided sciatica    Chronic left sacroiliac joint pain    left L5 radiculopathy    L3-4 & L4-5 mild grade 1 spondylolisthesis that are mildly unstable    L5-S1 left mod-severe/right mild-mod foraminal stenosis    L5 chronic and L2 chronic compression fractures with L2 vertebral augmentation    T12 chronic compression fracture and s/p augmentation    Lumbar spinal stenosis    Lumbar radiculitis    Cramp of extremity    Gastroesophageal reflux disease without esophagitis    Hoarseness of voice    Protein-calorie malnutrition, unspecified severity (HCC)    Hypokalemia     Azotemia    Atrial fibrillation with rapid ventricular response (HCC)    Nausea and vomiting, unspecified vomiting type    Sciatica, unspecified laterality     Past Medical History:   Diagnosis Date    Blood disorder     Cataract     Disorder of thyroid     Factor V Leiden mutation (HCC)     High cholesterol     Osteopenia     OTHER DISEASES     pancreatitis X 2    Problems with swallowing     Recurrent urinary tract infection        SURGICAL HISTORY:  Past Surgical History:   Procedure Laterality Date    CATARACT      CHOLECYSTECTOMY      because of pancreatic pseudocyst which was also removed.    COLONOSCOPY       - had upper and lower endoscopy; ; Dr Lagunas because of anemia - normal.    EXCISIONAL BIOSPY RIGHT  1973    HYSTERECTOMY      ORA/BSO in     IR VERTEBROPLASTY            OTHER SURGICAL HISTORY  11    cysto-dr. gerard       FAMILY HISTORY:  Family History   Problem Relation Age of Onset    Other (Other) Father         cva    Cancer Mother 80        breast cancer    Breast Cancer Mother 80        80    Cancer Other         breast    Breast Cancer Maternal Aunt 82        82       SOCIAL HISTORY:  Social History     Socioeconomic History    Marital status:      Spouse name: Not on file    Number of children: Not on file    Years of education: Not on file    Highest education level: Not on file   Occupational History    Not on file   Tobacco Use    Smoking status: Never    Smokeless tobacco: Never   Vaping Use    Vaping Use: Never used   Substance and Sexual Activity    Alcohol use: No     Alcohol/week: 0.0 standard drinks of alcohol    Drug use: No    Sexual activity: Not on file   Other Topics Concern     Service Not Asked    Blood Transfusions Not Asked    Caffeine Concern Yes     Comment: 1 cup of coffee daily    Occupational Exposure Not Asked    Hobby Hazards Not Asked    Sleep Concern Not Asked    Stress Concern Not Asked    Weight Concern Not Asked    Special Diet  Not Asked    Back Care Not Asked    Exercise Yes    Bike Helmet Not Asked    Seat Belt Not Asked    Self-Exams Not Asked   Social History Narrative    The patient does not use an assistive device..      The patient does live in a home with stairs.     Social Determinants of Health     Financial Resource Strain: Not on file   Food Insecurity: No Food Insecurity (1/17/2024)    Food Insecurity     Food Insecurity: Never true   Transportation Needs: No Transportation Needs (1/17/2024)    Transportation Needs     Lack of Transportation: No   Physical Activity: Not on file   Stress: Not on file   Social Connections: Not on file   Housing Stability: Low Risk  (1/17/2024)    Housing Stability     Housing Instability: No     Housing Instability Emergency: Not on file     PHYSICAL EXAMINATION:  Vitals:    02/19/24 0931   BP: (!) 180/73   Pulse: 80   Resp: 18      General: Alert and oriented x3  Affect:  NAD  Eyes: anicteric; no injection  Gait: Antalgic;  cane user - Yes  Spine: Normal  SLR: Positive left      IMAGING:  Narrative   PROCEDURE: CT SPINE LUMBAR (CPT=72131)     COMPARISON: [facility], CT SPINE LUMBAR (CPT=72131), 11/22/2023, 9:43 AM.     INDICATIONS: low back and pelvic pain     TECHNIQUE:   Multi-planar CT images were obtained without intravenous contrast material.  Automated exposure control for dose reduction was used. Adjustment of the mA and/or kV was done based on the patient's size. Use of iterative reconstruction  technique for dose reduction was used.  Dose information is transmitted to the ACR (American College of Radiology) NRDR (National Radiology Data Registry) which includes the Dose Index Registry.        FINDINGS:  PARASPINAL AREA: Normal with no visible mass.    BONES:   No acute fracture or subluxation.  Moderate chronic L5 vertebral compression fracture.  Moderate L2 compression fracture post kyphoplasty.  And partially visualized chronic T12 compression fracture post  kyphoplasty.  Osteoporosis.  Mild  bilateral SI joint degenerative change.  ALIGNMENT:   Normal.       LUMBAR DISC LEVELS:  L1-L2: Mild central and foraminal narrowing related to the L2 compression fracture.  L2-L3: Decrease in disc height with a spondylotic disc bulge.  Mild facet arthrosis.  Mild bilateral foraminal narrowing.  No significant central or lateral narrowing.  L3-L4: Advanced facet arthrosis.  Ligamentum flavum hypertrophy and calcification.  Spondylotic disc bulge.  Moderate central narrowing and mild foraminal narrowing.  L4-L5: Advanced facet arthrosis.  Ligamentum flavum hypertrophy.  A partially rim calcified right-sided synovial cyst along the ligamentum flavum and anterior facet joint capsule displaces the thecal sac.  Moderate to severe right lateral narrowing and  asymmetric central narrowing.  Mild bilateral foraminal narrowing.  L5-S1: Advanced facet arthrosis.  Disc degeneration with spondylotic disc bulge.  Mild central and lateral narrowing.  Moderate bilateral foraminal narrowing..     OTHER: Atherosclerotic vascular calcification.               Impression   CONCLUSION:  1. No acute fracture or subluxation.  Osteoporosis  2. Chronic L5, L2 and T12 vertebral compression fractures.  Post kyphoplasty at T12 and L2.  3. Multilevel disc and facet degeneration.  4. L4-L5:  Moderate to severe right lateral narrowing and asymmetric central narrowing.  5. L3-4:  Moderate central narrowing.  6. L5-S1:  Moderate bilateral foraminal narrowing.  Mild central and lateral narrowing.              Dictated by (CST): Vincent Pena MD on 1/03/2024 at 3:05 PM      Finalized by (CST): Vincent Pena MD on 1/03/2024 at 3:12 PM       PROCEDURE: MRI SPINE LUMBAR (CPT=72148)     COMPARISON: [facility], CT ABDOMEN PELVIS IV CONTRAST NO ORAL (ER), 1/17/2024, 1:45 PM.  [facility], MRI SPINE LUMBAR (CPT=72148), 2/24/2017, 11:20 AM.     INDICATIONS: lumbar radiculopathy      TECHNIQUE: A variety of imaging planes and parameters were utilized for visualization of suspected pathology.     FINDINGS:  NUMERATION: For the purposes of this examination, the lowest fully formed disc space is designated L5-S1.    BONES: Moderate chronic fracture deformity T12 status post kyphoplasty.  Mild-to-moderate chronic fracture deformity L2 status post kyphoplasty.  Moderate chronic appearing fracture deformity L5.  No acute fracture or osseous malalignment.  Multilevel  degenerative change with marginal osteophyte formation and facet hypertrophy.  Bone marrow signal is otherwise unremarkable.  CORD/CAUDA EQUINA: Normal caliber, contour, and signal intensity.  The conus terminates at T12.  PARASPINAL AREA: Unremarkable.    OTHER: Bilateral small T2 hyperintense foci in the kidneys incompletely characterized but probably representing small cysts.  Nonspecific urinary bladder distension.     LUMBAR DISC LEVELS:  L1-L2: Circumferential disc bulge with small central superimposed protrusion.  There is ligamentum flavum hypertrophy.  Mild central canal narrowing.  Moderate bilateral foraminal narrowing noted with facet arthropathy.  Similar findings were seen  previously.  L2-L3: Circumferential disc bulge with ligamentum flavum hypertrophy.  Mild central canal narrowing.  Moderate bilateral foraminal narrowing right greater than left with facet arthropathy.  Overall findings are mildly progressed.  L3-L4: Circumferential disc bulge with ligamentum flavum hypertrophy.  There is mild-to-moderate central canal narrowing mildly progressed.  Mild bilateral foraminal narrowing with facet arthropathy.  L4-L5: Circumferential disc bulge with ligamentum flavum hypertrophy.  Severe central canal narrowing.  This is progressed since the prior study.  Mild-to-moderate bilateral foraminal narrowing with facet arthropathy.  L5-S1: Circumferential disc bulge with ligamentum flavum hypertrophy.  Moderate central canal  narrowing is mildly progressed.  Left greater than right bilateral moderate foraminal narrowing facet arthropathy.               Impression   CONCLUSION:     Chronic T12, L2 and L5 vertebral compression deformity status post T12 and L2 kyphoplasties.  No acute fracture or osseous malalignment.     Mildly progressed multilevel lumbar spine degenerative changes described above.  The most significant level represents L4-L5 where there is severe central canal narrowing and mild-to-moderate bilateral foraminal narrowing.     Lesser incidental findings as above.     A preliminary report was issued by the [facility] teleradiology service. There are no major discrepancies.           Dictated by (CST): Jose Maria Murray MD on 1/18/2024 at 6:14 AM      Finalized by (CST): Jose Maria Murray MD on 1/18/2024 at 6:23 AM             [place] PHYSICIAN MONITORING PROGRAM REVIEWED  No    ASSESSMENT:   Christina Giraldo is a 89 year old  female, with   1. Lumbar back pain with radiculopathy affecting left lower extremity    2. left L5 radiculopathy    3. L5-S1 left mod-large/right mod far lateral, L4-5 mod left & left foraminal, L3-4 mod diffuse, L2-3 left mild-mod foraminal, L1-2 mod diffuse bulging discs    4. L5-S1 left mod-severe/right mild-mod foraminal stenosis    5. Spinal stenosis of lumbar region with neurogenic claudication    6. Compression fracture of lumbar vertebra, unspecified lumbar vertebral level, sequela      Status post interlaminar L4-5 epidural steroid injection which resulted in a 50% decrease of low back pain and right leg symptoms currently with left radicular leg pain with activity.    PLAN:  RECOMMENDATIONS:  1) left transforaminal epidural steroid injection L4-5 and L5-S1 with fluoroscopic guidance  - Continue medications as prescribed. Discussed with patient that his medication doses currently exceed the recommended dosages. He verbalizes understanding. Discussed with patient the risks of opioid  medications including but not limited to dependence, addiction, respiratory depression, and death.  Patient advised not to consume ETOH or operating heavy machinery or vehicles while taking this medication.  Patient verbalized understanding.    Comprehensive analgesic plan was formulated. Conservative vs. Aggressive measures were discussed at length including pharmacotherapy (eg. Anti- inflammatories, muscle relaxants, neuropathic medications, oral steroids, analgesics), injections, and further testing. Risks and benefits of all options were discussed at length to patients satisfaction during a comprehensive interactive discussion. All questions were answered during extended questions and answer session. Patient agreeable to discussion plan. Greater than 50% of the time was spent with counseling (nature of discussion centered around pain, therapy, and treatment options), face to face time, time spent reviewing data, obtaining patient information and discussing the care with the patients health care providers.     Pt will return to clinic 2 weeks post procedure    Total time: 28 minutes    PASQUALE REED MD  2/19/2024  Anesthesia Chronic Pain Service    n/a

## 2024-02-28 ENCOUNTER — TELEPHONE (OUTPATIENT)
Dept: PAIN CLINIC | Facility: HOSPITAL | Age: 89
End: 2024-02-28

## 2024-02-28 DIAGNOSIS — M48.061 SPINAL STENOSIS, LUMBAR REGION, WITHOUT NEUROGENIC CLAUDICATION: ICD-10-CM

## 2024-02-28 DIAGNOSIS — M54.16 LUMBAR RADICULOPATHY: Primary | ICD-10-CM

## 2024-03-25 ENCOUNTER — TELEPHONE (OUTPATIENT)
Dept: PAIN CLINIC | Facility: HOSPITAL | Age: 89
End: 2024-03-25

## 2024-04-10 ENCOUNTER — OFFICE VISIT (OUTPATIENT)
Dept: PAIN CLINIC | Facility: HOSPITAL | Age: 89
End: 2024-04-10
Attending: ANESTHESIOLOGY
Payer: MEDICARE

## 2024-04-10 VITALS
OXYGEN SATURATION: 97 % | WEIGHT: 120 LBS | HEART RATE: 82 BPM | BODY MASS INDEX: 21 KG/M2 | DIASTOLIC BLOOD PRESSURE: 89 MMHG | SYSTOLIC BLOOD PRESSURE: 147 MMHG

## 2024-04-10 DIAGNOSIS — M54.16 LUMBAR BACK PAIN WITH RADICULOPATHY AFFECTING LEFT LOWER EXTREMITY: Primary | ICD-10-CM

## 2024-04-10 DIAGNOSIS — M48.062 SPINAL STENOSIS OF LUMBAR REGION WITH NEUROGENIC CLAUDICATION: ICD-10-CM

## 2024-04-10 DIAGNOSIS — M54.16 LUMBAR RADICULOPATHY: ICD-10-CM

## 2024-04-10 PROCEDURE — 99211 OFF/OP EST MAY X REQ PHY/QHP: CPT

## 2024-04-10 RX ORDER — TIZANIDINE 4 MG/1
2 TABLET ORAL NIGHTLY
Qty: 7 TABLET | Refills: 0 | Status: SHIPPED | OUTPATIENT
Start: 2024-04-10 | End: 2024-04-24

## 2024-04-10 NOTE — CHRONIC PAIN
Follow-up Note  Interim note for 4/10/24: Ms. Huddleston: [facility] after undergoing a left transforaminal L4 and L5 epidural steroid injection on 3/14/2024.  She reports greater than 50% improvement today approximately 1 month since the injection.  Overall she is very happy with the outcome of the injection she has had persistent relief of her right leg pain since the epidural in January.  She has continued with physical therapy as well as some dry needling from her physical therapist.  Her only complaint at this point seems to be tightness in the thoracolumbar muscles which her therapist is having difficulty working out.  Patient denies any change in her bowel or bladder habits or any increased or change in pain or numbness.  HISTORY OF PRESENT ILLNESS:  Christina Giraldo is a 89 year old old female, originally referred to the pain clinic during her hospitalization in January 18, 2024 at which time she underwent a lumbar epidural steroid injection interlaminar at L4-5 for low back pain and neurogenic claudication.  She now presents for follow-up in the pain clinic reporting at least 50% relief of her lower back symptoms and right leg pain but still has persistent left radicular leg pain with ambulation.  1. Lumbar back pain with radiculopathy affecting left lower extremity    2. left L5 radiculopathy    3. Spinal stenosis of lumbar region with neurogenic claudication    Overall the patient feels that she is doing much better but this left leg pain seems to inhibit her activities of daily living.. The pain radiates into the left lower extremity all the way to the foot and ankle.  No recent injury/trauma.   Pt denies persistent numbness/tingling/weakness.  There is no incontinence of bowel/bladder. Coughing/sneezing/straining does not exacerbate the pain.       ALLERGIES:  Allergies   Allergen Reactions    Cephalexin OTHER (SEE COMMENTS)     Pt does not remember.     Codeine  [Opioid Analgesics] NAUSEA AND VOMITING    Etodolac OTHER (SEE COMMENTS)    Morphine NAUSEA AND VOMITING    Terbinafine OTHER (SEE COMMENTS)     Medication was not working.    Tramadol NAUSEA AND VOMITING    Ciprofloxacin DIZZINESS    Lamisil DIARRHEA    Omeprazole NAUSEA AND VOMITING       MEDICATION LIST:  Current Outpatient Medications   Medication Sig Dispense Refill    Cholecalciferol (VITAMIN D) 50 MCG (2000 UT) Oral Tab Take by mouth.      Multiple Vitamins-Minerals (BIOTIN PLUS/CALCIUM/VIT D3) Oral Tab Take by mouth.      metoprolol tartrate 25 MG Oral Tab Take 1 tablet (25 mg total) by mouth 2x Daily(Beta Blocker). (Patient not taking: Reported on 3/14/2024) 60 tablet 3    nitrofurantoin monohydrate macro 100 MG Oral Cap Take 1 capsule (100 mg total) by mouth daily. (Patient not taking: Reported on 3/14/2024)      Polyethylene Glycol 3350 (MIRALAX OR) Take by mouth as needed.      levothyroxine 75 MCG Oral Tab Take 1 tablet (75 mcg total) by mouth every morning before breakfast. (Patient taking differently: Take 88 mcg by mouth every morning before breakfast.) 90 tablet 3    famotidine 20 MG Oral Tab Take 1 tablet (20 mg total) by mouth 2 (two) times daily. (Patient not taking: Reported on 3/14/2024) 180 tablet 3    acetaminophen 500 MG Oral Tab Take 2 tablets (1,000 mg total) by mouth every 6 (six) hours as needed for Pain.      Denosumab 60 MG/ML Subcutaneous Solution Prefilled Syringe Inject 1 mL (60 mg total) into the skin once. Historical documentation - see Epic Immunization Activity for administration details 1 mL 0    Estradiol (VAGIFEM) 10 MCG Vaginal Tab Place 10 mcg vaginally 3 (three) times a week. 48 tablet 3    ondansetron 4 MG Oral Tablet Dispersible Take 1 tablet (4 mg total) by mouth every 8 (eight) hours as needed for Nausea. 5 tablet 3    cetirizine (ZYRTEC ALLERGY) 10 MG Oral Tab Take 1 tablet by mouth daily as needed for Allergies. 15 tablet 11      REVIEW OF SYSTEMS:   Bowel/Bladder  Incontinence: none  Coughing/sneezing/straining does not exacerbate the pain.  Numbness/tingling: as above  Weakness: as above  Weight Loss: Negative   Fever: Negative   Cardiovascular:  No current chest pain or palpitations   Respiratory:  No current shortness of breath   Gastrointestinal:  No active ulcer  Genitourinary:  Negative  Integumentary :  Negative  Psychiatric:  Negative  Hematologic: No active bleeding  Lymphatic: No current lymphedema  Allergic/Immunologic:  Negative  Musculoskeletal: As above  Neurological: As above  Denies chest pain, shortness of breath.    MEDICAL HISTORY:  Patient Active Problem List   Diagnosis    Hypothyroidism, unspecified type    Recurrent UTI    H/O: hysterectomy    Factor V Leiden mutation ([facility])    Compression fracture    Anemia    Actinic keratosis    PAF (paroxysmal atrial fibrillation) ([facility])    Senile osteoporosis    Pathologic fracture of vertebrae    L3-4 stable>L4-5 unstable grade1 spondylolisthesis    L5-S1 left mod-large/right mod far lateral, L4-5 mod left & left foraminal, L3-4 mod diffuse, L2-3 left mild-mod foraminal, L1-2 mod diffuse bulging discs    L3-4 mild-mod, L4-5 mod central stenosis    Bilateral neck pain    Right hand weakness    bilateral C5-6 radiculopathies    C6-7 left mild-mod, C5-6 right mild-mod paracentral/right mod foraminal, C3-4 mild-mod central & right foraminal2-3 right mild paracentral bulging discs    C4-5 right mod HNP    C6-7 left mod foraminal, C4-5 mild central stenosis    Thoracic back pain    Other closed displaced fracture of proximal end of left humerus    Tear of left rotator cuff, unspecified tear extent    Vitamin D deficiency    Other closed intra-articular fracture of distal end of right radius, initial encounter    Other closed intra-articular fracture of distal end of right radius with malunion, subsequent encounter    Right wrist pain    SX/GLOBAL/  /EOSC/RIGHT EXTENSOR TENOYYSIS AND TENOSYNOVECTOMY 4TH EXTENSOR  COMPARTMENT / DOS 17 / EXP 17    Ruptured extensor tendon of hand or wrist, right, initial encounter    Chronic left-sided low back pain with left-sided sciatica    Chronic left sacroiliac joint pain    left L5 radiculopathy    L3-4 & L4-5 mild grade 1 spondylolisthesis that are mildly unstable    L5-S1 left mod-severe/right mild-mod foraminal stenosis    L5 chronic and L2 chronic compression fractures with L2 vertebral augmentation    T12 chronic compression fracture and s/p augmentation    Lumbar spinal stenosis    Lumbar radiculitis    Cramp of extremity    Gastroesophageal reflux disease without esophagitis    Hoarseness of voice    Protein-calorie malnutrition, unspecified severity (HCC)    Hypokalemia    Azotemia    Atrial fibrillation with rapid ventricular response (HCC)    Nausea and vomiting, unspecified vomiting type    Sciatica, unspecified laterality     Past Medical History:    Blood disorder    Cataract    Disorder of thyroid    Factor V Leiden mutation (HCC)    High cholesterol    Osteopenia    OTHER DISEASES    pancreatitis X 2    Problems with swallowing    Recurrent urinary tract infection       SURGICAL HISTORY:  Past Surgical History:   Procedure Laterality Date    Cataract      Cholecystectomy      because of pancreatic pseudocyst which was also removed.    Colonoscopy       - had upper and lower endoscopy; ; Dr Lagunas because of anemia - normal.    Excisional biospy right  1973    Hysterectomy      ORA/BSO in 's    Ir vertebroplasty            Other surgical history  11    cysto-dr. gerard       FAMILY HISTORY:  Family History   Problem Relation Age of Onset    Other (Other) Father         cva    Cancer Mother 80        breast cancer    Breast Cancer Mother 80        80    Cancer Other         breast    Breast Cancer Maternal Aunt 82        82       SOCIAL HISTORY:  Social History     Socioeconomic History    Marital status:      Spouse name: Not on file     Number of children: Not on file    Years of education: Not on file    Highest education level: Not on file   Occupational History    Not on file   Tobacco Use    Smoking status: Never    Smokeless tobacco: Never   Substance and Sexual Activity    Alcohol use: No     Alcohol/week: 0.0 standard drinks of alcohol    Drug use: No    Sexual activity: Not on file   Other Topics Concern     Service Not Asked    Blood Transfusions Not Asked    Caffeine Concern Yes     Comment: 1 cup of coffee daily    Occupational Exposure Not Asked    Hobby Hazards Not Asked    Sleep Concern Not Asked    Stress Concern Not Asked    Weight Concern Not Asked    Special Diet Not Asked    Back Care Not Asked    Exercise Yes    Bike Helmet Not Asked    Seat Belt Not Asked    Self-Exams Not Asked   Social History Narrative    The patient does not use an assistive device..      The patient does live in a home with stairs.     Social Determinants of Health     Financial Resource Strain: Not on file   Food Insecurity: No Food Insecurity (1/17/2024)    Food Insecurity     Food Insecurity: Never true   Transportation Needs: No Transportation Needs (1/17/2024)    Transportation Needs     Lack of Transportation: No   Physical Activity: Not on file   Stress: Not on file   Social Connections: Not on file   Housing Stability: Low Risk  (1/17/2024)    Housing Stability     Housing Instability: No     Housing Instability Emergency: Not on file     PHYSICAL EXAMINATION:  Vitals:    04/10/24 0926   BP: 147/89   Pulse: 82      General: Alert and oriented x3  Affect:  NAD  Eyes: anicteric; no injection  Gait: Antalgic;  cane user - Yes  Spine: Normal  SLR: Positive left      IMAGING:  Narrative   PROCEDURE: CT SPINE LUMBAR (CPT=72131)     COMPARISON: [facility], CT SPINE LUMBAR (CPT=72131), 11/22/2023, 9:43 AM.     INDICATIONS: low back and pelvic pain     TECHNIQUE:   Multi-planar CT images were obtained without intravenous contrast  material.  Automated exposure control for dose reduction was used. Adjustment of the mA and/or kV was done based on the patient's size. Use of iterative reconstruction  technique for dose reduction was used.  Dose information is transmitted to the ACR (American College of Radiology) NRDR (National Radiology Data Registry) which includes the Dose Index Registry.        FINDINGS:  PARASPINAL AREA: Normal with no visible mass.    BONES:   No acute fracture or subluxation.  Moderate chronic L5 vertebral compression fracture.  Moderate L2 compression fracture post kyphoplasty.  And partially visualized chronic T12 compression fracture post kyphoplasty.  Osteoporosis.  Mild  bilateral SI joint degenerative change.  ALIGNMENT:   Normal.       LUMBAR DISC LEVELS:  L1-L2: Mild central and foraminal narrowing related to the L2 compression fracture.  L2-L3: Decrease in disc height with a spondylotic disc bulge.  Mild facet arthrosis.  Mild bilateral foraminal narrowing.  No significant central or lateral narrowing.  L3-L4: Advanced facet arthrosis.  Ligamentum flavum hypertrophy and calcification.  Spondylotic disc bulge.  Moderate central narrowing and mild foraminal narrowing.  L4-L5: Advanced facet arthrosis.  Ligamentum flavum hypertrophy.  A partially rim calcified right-sided synovial cyst along the ligamentum flavum and anterior facet joint capsule displaces the thecal sac.  Moderate to severe right lateral narrowing and  asymmetric central narrowing.  Mild bilateral foraminal narrowing.  L5-S1: Advanced facet arthrosis.  Disc degeneration with spondylotic disc bulge.  Mild central and lateral narrowing.  Moderate bilateral foraminal narrowing..     OTHER: Atherosclerotic vascular calcification.               Impression   CONCLUSION:  1. No acute fracture or subluxation.  Osteoporosis  2. Chronic L5, L2 and T12 vertebral compression fractures.  Post kyphoplasty at T12 and L2.  3. Multilevel disc and facet  degeneration.  4. L4-L5:  Moderate to severe right lateral narrowing and asymmetric central narrowing.  5. L3-4:  Moderate central narrowing.  6. L5-S1:  Moderate bilateral foraminal narrowing.  Mild central and lateral narrowing.              Dictated by (CST): Vincent Pena MD on 1/03/2024 at 3:05 PM      Finalized by (CST): Vincent Pena MD on 1/03/2024 at 3:12 PM       PROCEDURE: MRI SPINE LUMBAR (CPT=72148)     COMPARISON: [facility], CT ABDOMEN PELVIS IV CONTRAST NO ORAL (ER), 1/17/2024, 1:45 PM.  [facility], MRI SPINE LUMBAR (CPT=72148), 2/24/2017, 11:20 AM.     INDICATIONS: lumbar radiculopathy     TECHNIQUE: A variety of imaging planes and parameters were utilized for visualization of suspected pathology.     FINDINGS:  NUMERATION: For the purposes of this examination, the lowest fully formed disc space is designated L5-S1.    BONES: Moderate chronic fracture deformity T12 status post kyphoplasty.  Mild-to-moderate chronic fracture deformity L2 status post kyphoplasty.  Moderate chronic appearing fracture deformity L5.  No acute fracture or osseous malalignment.  Multilevel  degenerative change with marginal osteophyte formation and facet hypertrophy.  Bone marrow signal is otherwise unremarkable.  CORD/CAUDA EQUINA: Normal caliber, contour, and signal intensity.  The conus terminates at T12.  PARASPINAL AREA: Unremarkable.    OTHER: Bilateral small T2 hyperintense foci in the kidneys incompletely characterized but probably representing small cysts.  Nonspecific urinary bladder distension.     LUMBAR DISC LEVELS:  L1-L2: Circumferential disc bulge with small central superimposed protrusion.  There is ligamentum flavum hypertrophy.  Mild central canal narrowing.  Moderate bilateral foraminal narrowing noted with facet arthropathy.  Similar findings were seen  previously.  L2-L3: Circumferential disc bulge with ligamentum flavum hypertrophy.  Mild central canal  narrowing.  Moderate bilateral foraminal narrowing right greater than left with facet arthropathy.  Overall findings are mildly progressed.  L3-L4: Circumferential disc bulge with ligamentum flavum hypertrophy.  There is mild-to-moderate central canal narrowing mildly progressed.  Mild bilateral foraminal narrowing with facet arthropathy.  L4-L5: Circumferential disc bulge with ligamentum flavum hypertrophy.  Severe central canal narrowing.  This is progressed since the prior study.  Mild-to-moderate bilateral foraminal narrowing with facet arthropathy.  L5-S1: Circumferential disc bulge with ligamentum flavum hypertrophy.  Moderate central canal narrowing is mildly progressed.  Left greater than right bilateral moderate foraminal narrowing facet arthropathy.               Impression   CONCLUSION:     Chronic T12, L2 and L5 vertebral compression deformity status post T12 and L2 kyphoplasties.  No acute fracture or osseous malalignment.     Mildly progressed multilevel lumbar spine degenerative changes described above.  The most significant level represents L4-L5 where there is severe central canal narrowing and mild-to-moderate bilateral foraminal narrowing.     Lesser incidental findings as above.     A preliminary report was issued by the [facility] teleradiology service. There are no major discrepancies.           Dictated by (CST): Jose Maria Murray MD on 1/18/2024 at 6:14 AM      Finalized by (CST): Jose Maria Murray MD on 1/18/2024 at 6:23 AM             [place] PHYSICIAN MONITORING PROGRAM REVIEWED  No    ASSESSMENT:   Christina Giraldo is a 89 year old  female, with   1. Lumbar back pain with radiculopathy affecting left lower extremity    2. left L5 radiculopathy    3. Spinal stenosis of lumbar region with neurogenic claudication      Status post interlaminar L4-5 epidural steroid injection which resulted in a 50% decrease of low back pain and right leg symptoms.  Now status post left transforaminal  injection L4 and L5 with 50% decrease of her left radicular leg pains.  PLAN:  RECOMMENDATIONS:  1) addition of tizanidine 2 mg nightly x 2 weeks  - Continue medications as prescribed. Discussed with patient that his medication doses currently exceed the recommended dosages. He verbalizes understanding. Discussed with patient the risks of opioid medications including but not limited to dependence, addiction, respiratory depression, and death.  Patient advised not to consume ETOH or operating heavy machinery or vehicles while taking this medication.  Patient verbalized understanding.    Comprehensive analgesic plan was formulated. Conservative vs. Aggressive measures were discussed at length including pharmacotherapy (eg. Anti- inflammatories, muscle relaxants, neuropathic medications, oral steroids, analgesics), injections, and further testing. Risks and benefits of all options were discussed at length to patients satisfaction during a comprehensive interactive discussion. All questions were answered during extended questions and answer session. Patient agreeable to discussion plan. Greater than 50% of the time was spent with counseling (nature of discussion centered around pain, therapy, and treatment options), face to face time, time spent reviewing data, obtaining patient information and discussing the care with the patients health care providers.     Pt will return to clinic as needed    Total time: 25 minutes

## 2024-04-10 NOTE — PATIENT INSTRUCTIONS
Refill policies:    Allow 2-3 business days for refills; controlled substances may take longer.  Contact your pharmacy at least 5 days prior to running out of medication and have them send an electronic request or submit request through the “request refill” option in your [employer] account.  Refills are not addressed on weekends; covering physicians do not authorize routine medications on weekends.  No narcotics or controlled substances are refilled after noon on Fridays or by on call physicians.  By law, narcotics must be electronically prescribed.  A 30 day supply with no refills is the maximum allowed.  If your prescription is due for a refill, you may be due for a follow up appointment.  To best provide you care, patients receiving routine medications need to be seen at least once a year.  Patients receiving narcotic/controlled substance medications need to be seen at least once every 3 months.  In the event that your preferred pharmacy does not have the requested medication in stock (e.g. Backordered), it is your responsibility to find another pharmacy that has the requested medication available.  We will gladly send a new prescription to that pharmacy at your request.    Scheduling Tests:    If your physician has ordered radiology tests such as MRI or CT scans, please contact Central Scheduling at [phone] right away to schedule the test.  Once scheduled, the [facility] Centralized Referral Team will work with your insurance carrier to obtain pre-certification or prior authorization.  Depending on your insurance carrier, approval may take 3-10 days.  It is highly recommended patients assure they have received an authorization before having a test performed.  If test is done without insurance authorization, patient may be responsible for the entire amount billed.      Precertification and Prior Authorizations:  If your physician has recommended that you have a procedure or additional testing performed the [facility]  Centralized Referral Team will contact your insurance carrier to obtain pre-certification or prior authorization.    You are strongly encouraged to contact your insurance carrier to verify that your procedure/test has been approved and is a COVERED benefit.  Although the [facility] Centralized Referral Team does its due diligence, the insurance carrier gives the disclaimer that \"Although the procedure is authorized, this does not guarantee payment.\"    Ultimately the patient is responsible for payment.   Thank you for your understanding in this matter.  Paperwork Completion:  If you require FMLA or disability paperwork for your recovery, please make sure to either drop it off or have it faxed to our office at [phone]. Be sure the form has your name and date of birth on it.  The form will be faxed to our Forms Department and they will complete it for you.  There is a 25$ fee for all forms that need to be filled out.  Please be aware there is a 10-14 day turnaround time.  You will need to sign a release of information (TORY) form if your paperwork does not come with one.  You may call the Forms Department with any questions at [phone].  Their fax number is [phone].

## 2024-04-10 NOTE — PROGRESS NOTES
Last procedure: LT TRANS L4/5 & L5/S1     Date: 03.14.24     Percentage of relief obtained: 50%     Duration of relief: constant.      Some increased left sided back pain.     Current Pain Score: 4    Narcotic Contract Exp: NA

## 2024-06-12 ENCOUNTER — OFFICE VISIT (OUTPATIENT)
Dept: PAIN CLINIC | Facility: HOSPITAL | Age: 89
End: 2024-06-12
Attending: ANESTHESIOLOGY
Payer: MEDICARE

## 2024-06-12 VITALS
OXYGEN SATURATION: 95 % | BODY MASS INDEX: 21 KG/M2 | SYSTOLIC BLOOD PRESSURE: 153 MMHG | DIASTOLIC BLOOD PRESSURE: 82 MMHG | HEART RATE: 85 BPM | WEIGHT: 120 LBS

## 2024-06-12 DIAGNOSIS — M48.062 SPINAL STENOSIS OF LUMBAR REGION WITH NEUROGENIC CLAUDICATION: ICD-10-CM

## 2024-06-12 DIAGNOSIS — M54.16 LUMBAR RADICULOPATHY: Primary | ICD-10-CM

## 2024-06-12 DIAGNOSIS — M54.16 LUMBAR BACK PAIN WITH RADICULOPATHY AFFECTING LEFT LOWER EXTREMITY: ICD-10-CM

## 2024-06-12 PROCEDURE — 99211 OFF/OP EST MAY X REQ PHY/QHP: CPT

## 2024-06-12 RX ORDER — BACLOFEN 10 MG/1
5 TABLET ORAL 2 TIMES DAILY
Qty: 14 TABLET | Refills: 0 | Status: SHIPPED | OUTPATIENT
Start: 2024-06-12 | End: 2024-06-26

## 2024-06-12 NOTE — CHRONIC PAIN
Follow-up Note  Interim note for 6/12/24: Ms. Giraldo has [facility] back to the [facility] due to a persistent complaint of tightness in her right leg.  She reports she has right IT band tightness and tightness down her leg and her right calf which she has been suffering from for greater than a year.  She is happy reports she no longer has any left leg sciatica which she was treated for with a lumbar epidural steroid injection followed by a left transforaminal injection at 2 levels.  She was placed on tizanidine for trial but did not obtain any relief of her tightness in her leg.  She reports that she obtains most of her relief in the past by dry needling as well as in a occasionally had a IT band injection by Dr. Bolton orthopedic surgeon.  At this time we discussed with the patient that we would not recommend using her symptom of tightness as we do not believe this will be beneficial.  But we have discussed alternative medications that may help as well as possibly being evaluated for peripheral vascular disease of the lower extremities which can be contributing to the symptoms.  The above can be managed through her PCP.  Patient reports that she has tried gabapentin in the past and has not been beneficial for the leg tightness.      Interim note for 4/10/24: Ms. Giraldo has returned back to the [facility] for [facility] after undergoing a left transforaminal L4 and L5 epidural steroid injection on 3/14/2024.  She reports greater than 50% improvement today approximately 1 month since the injection.  Overall she is very happy with the outcome of the injection she has had persistent relief of her right leg pain since the epidural in January.  She has continued with physical therapy as well as some dry needling from her physical therapist.  Her only complaint at this point seems to be tightness in the thoracolumbar muscles which her therapist is having difficulty working out.  Patient denies any change  in her bowel or bladder habits or any increased or change in pain or numbness.  HISTORY OF PRESENT ILLNESS:  Christina Giraldo is a 89 year old old female, originally referred to the pain clinic during her hospitalization in January 18, 2024 at which time she underwent a lumbar epidural steroid injection interlaminar at L4-5 for low back pain and neurogenic claudication.  She now presents for follow-up in the pain clinic reporting at least 50% relief of her lower back symptoms and right leg pain but still has persistent left radicular leg pain with ambulation.  1. left L5 radiculopathy    2. Spinal stenosis of lumbar region with neurogenic claudication    3. Lumbar back pain with radiculopathy affecting left lower extremity    Overall the patient feels that she is doing much better but this left leg pain seems to inhibit her activities of daily living.. The pain radiates into the left lower extremity all the way to the foot and ankle.  No recent injury/trauma.   Pt denies persistent numbness/tingling/weakness.  There is no incontinence of bowel/bladder. Coughing/sneezing/straining does not exacerbate the pain.       ALLERGIES:  Allergies   Allergen Reactions    Cephalexin OTHER (SEE COMMENTS)     Pt does not remember.     Codeine [Opioid Analgesics] NAUSEA AND VOMITING    Etodolac OTHER (SEE COMMENTS)    Morphine NAUSEA AND VOMITING    Terbinafine OTHER (SEE COMMENTS)     Medication was not working.    Tramadol NAUSEA AND VOMITING    Ciprofloxacin DIZZINESS    Lamisil DIARRHEA    Omeprazole NAUSEA AND VOMITING       MEDICATION LIST:  Current Outpatient Medications   Medication Sig Dispense Refill    Cholecalciferol (VITAMIN D) 50 MCG (2000 UT) Oral Tab Take by mouth.      Multiple Vitamins-Minerals (BIOTIN PLUS/CALCIUM/VIT D3) Oral Tab Take by mouth.      metoprolol tartrate 25 MG Oral Tab Take 1 tablet (25 mg total) by mouth 2x Daily(Beta Blocker). 60 tablet 3    nitrofurantoin monohydrate macro 100 MG Oral  Cap Take 1 capsule (100 mg total) by mouth daily.      Polyethylene Glycol 3350 (MIRALAX OR) Take by mouth as needed.      levothyroxine 75 MCG Oral Tab Take 1 tablet (75 mcg total) by mouth every morning before breakfast. (Patient taking differently: Take 88 mcg by mouth every morning before breakfast.) 90 tablet 3    famotidine 20 MG Oral Tab Take 1 tablet (20 mg total) by mouth 2 (two) times daily. 180 tablet 3    acetaminophen 500 MG Oral Tab Take 2 tablets (1,000 mg total) by mouth every 6 (six) hours as needed for Pain.      Denosumab 60 MG/ML Subcutaneous Solution Prefilled Syringe Inject 1 mL (60 mg total) into the skin once. Historical documentation - see Epic Immunization Activity for administration details 1 mL 0    Estradiol (VAGIFEM) 10 MCG Vaginal Tab Place 10 mcg vaginally 3 (three) times a week. 48 tablet 3    ondansetron 4 MG Oral Tablet Dispersible Take 1 tablet (4 mg total) by mouth every 8 (eight) hours as needed for Nausea. 5 tablet 3    cetirizine (ZYRTEC ALLERGY) 10 MG Oral Tab Take 1 tablet by mouth daily as needed for Allergies. 15 tablet 11      REVIEW OF SYSTEMS:   Bowel/Bladder Incontinence: none  Coughing/sneezing/straining does not exacerbate the pain.  Numbness/tingling: as above  Weakness: as above  Weight Loss: Negative   Fever: Negative   Cardiovascular:  No current chest pain or palpitations   Respiratory:  No current shortness of breath   Gastrointestinal:  No active ulcer  Genitourinary:  Negative  Integumentary :  Negative  Psychiatric:  Negative  Hematologic: No active bleeding  Lymphatic: No current lymphedema  Allergic/Immunologic:  Negative  Musculoskeletal: As above  Neurological: As above  Denies chest pain, shortness of breath.    MEDICAL HISTORY:  Patient Active Problem List   Diagnosis    Hypothyroidism, unspecified type    Recurrent UTI    H/O: hysterectomy    Factor V Leiden mutation (HCC)    Compression fracture    Anemia    Actinic keratosis    PAF (paroxysmal  atrial fibrillation) (HCC)    Senile osteoporosis    Pathologic fracture of vertebrae    L3-4 stable>L4-5 unstable grade1 spondylolisthesis    L5-S1 left mod-large/right mod far lateral, L4-5 mod left & left foraminal, L3-4 mod diffuse, L2-3 left mild-mod foraminal, L1-2 mod diffuse bulging discs    L3-4 mild-mod, L4-5 mod central stenosis    Bilateral neck pain    Right hand weakness    bilateral C5-6 radiculopathies    C6-7 left mild-mod, C5-6 right mild-mod paracentral/right mod foraminal, C3-4 mild-mod central & right foraminal2-3 right mild paracentral bulging discs    C4-5 right mod HNP    C6-7 left mod foraminal, C4-5 mild central stenosis    Thoracic back pain    Other closed displaced fracture of proximal end of left humerus    Tear of left rotator cuff, unspecified tear extent    Vitamin D deficiency    Other closed intra-articular fracture of distal end of right radius, initial encounter    Other closed intra-articular fracture of distal end of right radius with malunion, subsequent encounter    Right wrist pain    SX/GLOBAL/  /EOSC/RIGHT EXTENSOR TENOYYSIS AND TENOSYNOVECTOMY 4TH EXTENSOR COMPARTMENT / DOS 01/17/17 / EXP 04/17/17    Ruptured extensor tendon of hand or wrist, right, initial encounter    Chronic left-sided low back pain with left-sided sciatica    Chronic left sacroiliac joint pain    left L5 radiculopathy    L3-4 & L4-5 mild grade 1 spondylolisthesis that are mildly unstable    L5-S1 left mod-severe/right mild-mod foraminal stenosis    L5 chronic and L2 chronic compression fractures with L2 vertebral augmentation    T12 chronic compression fracture and s/p augmentation    Lumbar spinal stenosis    Lumbar radiculitis    Cramp of extremity    Gastroesophageal reflux disease without esophagitis    Hoarseness of voice    Protein-calorie malnutrition, unspecified severity (HCC)    Hypokalemia    Azotemia    Atrial fibrillation with rapid ventricular response (HCC)    Nausea and  vomiting, unspecified vomiting type    Sciatica, unspecified laterality     Past Medical History:    Blood disorder    Cataract    Disorder of thyroid    Factor V Leiden mutation (HCC)    High cholesterol    Osteopenia    OTHER DISEASES    pancreatitis X 2    Problems with swallowing    Recurrent urinary tract infection       SURGICAL HISTORY:  Past Surgical History:   Procedure Laterality Date    Cataract      Cholecystectomy      because of pancreatic pseudocyst which was also removed.    Colonoscopy       - had upper and lower endoscopy; ; Dr Lagunas because of anemia - normal.    Excisional biospy right  1973    Hysterectomy      ORA/BSO in     Ir vertebroplasty            Other surgical history  11    cysto-dr. gerard       FAMILY HISTORY:  Family History   Problem Relation Age of Onset    Other (Other) Father         cva    Cancer Mother 80        breast cancer    Breast Cancer Mother 80        80    Cancer Other         breast    Breast Cancer Maternal Aunt 82        82       SOCIAL HISTORY:  Social History     Socioeconomic History    Marital status:      Spouse name: Not on file    Number of children: Not on file    Years of education: Not on file    Highest education level: Not on file   Occupational History    Not on file   Tobacco Use    Smoking status: Never    Smokeless tobacco: Never   Substance and Sexual Activity    Alcohol use: No     Alcohol/week: 0.0 standard drinks of alcohol    Drug use: No    Sexual activity: Not on file   Other Topics Concern     Service Not Asked    Blood Transfusions Not Asked    Caffeine Concern Yes     Comment: 1 cup of coffee daily    Occupational Exposure Not Asked    Hobby Hazards Not Asked    Sleep Concern Not Asked    Stress Concern Not Asked    Weight Concern Not Asked    Special Diet Not Asked    Back Care Not Asked    Exercise Yes    Bike Helmet Not Asked    Seat Belt Not Asked    Self-Exams Not Asked   Social History Narrative     The patient does not use an assistive device..      The patient does live in a home with stairs.     Social Determinants of Health     Financial Resource Strain: Not on file   Food Insecurity: No Food Insecurity (1/17/2024)    Food Insecurity     Food Insecurity: Never true   Transportation Needs: No Transportation Needs (1/17/2024)    Transportation Needs     Lack of Transportation: No   Physical Activity: Not on file   Stress: Not on file   Social Connections: Not on file   Housing Stability: Low Risk  (1/17/2024)    Housing Stability     Housing Instability: No     Housing Instability Emergency: Not on file     Crib or Bassinette: Not on file     PHYSICAL EXAMINATION:  Vitals:    06/12/24 0804   BP: 153/82   Pulse: 85      General: Alert and oriented x3  Affect:  NAD  Eyes: anicteric; no injection  Gait: Antalgic;  cane user - Yes  Spine: Normal  SLR: Positive left      IMAGING:  Narrative   PROCEDURE: CT SPINE LUMBAR (CPT=72131)     COMPARISON: [facility], CT SPINE LUMBAR (CPT=72131), 11/22/2023, 9:43 AM.     INDICATIONS: low back and pelvic pain     TECHNIQUE:   Multi-planar CT images were obtained without intravenous contrast material.  Automated exposure control for dose reduction was used. Adjustment of the mA and/or kV was done based on the patient's size. Use of iterative reconstruction  technique for dose reduction was used.  Dose information is transmitted to the ACR (American College of Radiology) NRDR (National Radiology Data Registry) which includes the Dose Index Registry.        FINDINGS:  PARASPINAL AREA: Normal with no visible mass.    BONES:   No acute fracture or subluxation.  Moderate chronic L5 vertebral compression fracture.  Moderate L2 compression fracture post kyphoplasty.  And partially visualized chronic T12 compression fracture post kyphoplasty.  Osteoporosis.  Mild  bilateral SI joint degenerative change.  ALIGNMENT:   Normal.       LUMBAR DISC LEVELS:  L1-L2: Mild  central and foraminal narrowing related to the L2 compression fracture.  L2-L3: Decrease in disc height with a spondylotic disc bulge.  Mild facet arthrosis.  Mild bilateral foraminal narrowing.  No significant central or lateral narrowing.  L3-L4: Advanced facet arthrosis.  Ligamentum flavum hypertrophy and calcification.  Spondylotic disc bulge.  Moderate central narrowing and mild foraminal narrowing.  L4-L5: Advanced facet arthrosis.  Ligamentum flavum hypertrophy.  A partially rim calcified right-sided synovial cyst along the ligamentum flavum and anterior facet joint capsule displaces the thecal sac.  Moderate to severe right lateral narrowing and  asymmetric central narrowing.  Mild bilateral foraminal narrowing.  L5-S1: Advanced facet arthrosis.  Disc degeneration with spondylotic disc bulge.  Mild central and lateral narrowing.  Moderate bilateral foraminal narrowing..     OTHER: Atherosclerotic vascular calcification.               Impression   CONCLUSION:  1. No acute fracture or subluxation.  Osteoporosis  2. Chronic L5, L2 and T12 vertebral compression fractures.  Post kyphoplasty at T12 and L2.  3. Multilevel disc and facet degeneration.  4. L4-L5:  Moderate to severe right lateral narrowing and asymmetric central narrowing.  5. L3-4:  Moderate central narrowing.  6. L5-S1:  Moderate bilateral foraminal narrowing.  Mild central and lateral narrowing.              Dictated by (CST): Vincent Pena MD on 1/03/2024 at 3:05 PM      Finalized by (CST): Vincent Pena MD on 1/03/2024 at 3:12 PM       PROCEDURE: MRI SPINE LUMBAR (CPT=72148)     COMPARISON: [facility], CT ABDOMEN PELVIS IV CONTRAST NO ORAL (ER), 1/17/2024, 1:45 PM.  [facility], MRI SPINE LUMBAR (CPT=72148), 2/24/2017, 11:20 AM.     INDICATIONS: lumbar radiculopathy     TECHNIQUE: A variety of imaging planes and parameters were utilized for visualization of suspected pathology.     FINDINGS:  NUMERATION:  For the purposes of this examination, the lowest fully formed disc space is designated L5-S1.    BONES: Moderate chronic fracture deformity T12 status post kyphoplasty.  Mild-to-moderate chronic fracture deformity L2 status post kyphoplasty.  Moderate chronic appearing fracture deformity L5.  No acute fracture or osseous malalignment.  Multilevel  degenerative change with marginal osteophyte formation and facet hypertrophy.  Bone marrow signal is otherwise unremarkable.  CORD/CAUDA EQUINA: Normal caliber, contour, and signal intensity.  The conus terminates at T12.  PARASPINAL AREA: Unremarkable.    OTHER: Bilateral small T2 hyperintense foci in the kidneys incompletely characterized but probably representing small cysts.  Nonspecific urinary bladder distension.     LUMBAR DISC LEVELS:  L1-L2: Circumferential disc bulge with small central superimposed protrusion.  There is ligamentum flavum hypertrophy.  Mild central canal narrowing.  Moderate bilateral foraminal narrowing noted with facet arthropathy.  Similar findings were seen  previously.  L2-L3: Circumferential disc bulge with ligamentum flavum hypertrophy.  Mild central canal narrowing.  Moderate bilateral foraminal narrowing right greater than left with facet arthropathy.  Overall findings are mildly progressed.  L3-L4: Circumferential disc bulge with ligamentum flavum hypertrophy.  There is mild-to-moderate central canal narrowing mildly progressed.  Mild bilateral foraminal narrowing with facet arthropathy.  L4-L5: Circumferential disc bulge with ligamentum flavum hypertrophy.  Severe central canal narrowing.  This is progressed since the prior study.  Mild-to-moderate bilateral foraminal narrowing with facet arthropathy.  L5-S1: Circumferential disc bulge with ligamentum flavum hypertrophy.  Moderate central canal narrowing is mildly progressed.  Left greater than right bilateral moderate foraminal narrowing facet arthropathy.               Impression    CONCLUSION:     Chronic T12, L2 and L5 vertebral compression deformity status post T12 and L2 kyphoplasties.  No acute fracture or osseous malalignment.     Mildly progressed multilevel lumbar spine degenerative changes described above.  The most significant level represents L4-L5 where there is severe central canal narrowing and mild-to-moderate bilateral foraminal narrowing.     Lesser incidental findings as above.     A preliminary report was issued by the [facility] teleradiology service. There are no major discrepancies.           Dictated by (CST): Jose Maria Murray MD on 1/18/2024 at 6:14 AM      Finalized by (CST): Jose Maria Murray MD on 1/18/2024 at 6:23 AM             [place] PHYSICIAN MONITORING PROGRAM REVIEWED  No    ASSESSMENT:   Christina Giraldo is a 89 year old  female, with   1. left L5 radiculopathy    2. Spinal stenosis of lumbar region with neurogenic claudication    3. Lumbar back pain with radiculopathy affecting left lower extremity      Status post interlaminar L4-5 epidural steroid injection which resulted in a 50% decrease of low back pain and right leg symptoms.  Now status post left transforaminal injection L4 and L5 with 50% decrease of her left radicular leg pains.  PLAN:  RECOMMENDATIONS:  #1 discussed with patient a trial of low-dose baclofen 5 mg twice daily x 2 weeks which she is interested in trying.  #2 patient reports that she will be following up with her primary care physician and will address potentially peripheral vascular disease of her lower extremities.  #3 if baclofen is successful her primary care physician can continue this or other options would be pregabalin or Cymbalta which possibly may be beneficial.        - Continue medications as prescribed. Discussed with patient that his medication doses currently exceed the recommended dosages. He verbalizes understanding. Discussed with patient the risks of opioid medications including but not limited to  dependence, addiction, respiratory depression, and death.  Patient advised not to consume ETOH or operating heavy machinery or vehicles while taking this medication.  Patient verbalized understanding.    Comprehensive analgesic plan was formulated. Conservative vs. Aggressive measures were discussed at length including pharmacotherapy (eg. Anti- inflammatories, muscle relaxants, neuropathic medications, oral steroids, analgesics), injections, and further testing. Risks and benefits of all options were discussed at length to patients satisfaction during a comprehensive interactive discussion. All questions were answered during extended questions and answer session. Patient agreeable to discussion plan. Greater than 50% of the time was spent with counseling (nature of discussion centered around pain, therapy, and treatment options), face to face time, time spent reviewing data, obtaining patient information and discussing the care with the patients health care providers.     Pt will return to clinic as needed    Total time: 23 minutes

## 2024-06-12 NOTE — PATIENT INSTRUCTIONS
Refill policies:    Allow 2-3 business days for refills; controlled substances may take longer.  Contact your pharmacy at least 5 days prior to running out of medication and have them send an electronic request or submit request through the “request refill” option in your [employer] account.  Refills are not addressed on weekends; covering physicians do not authorize routine medications on weekends.  No narcotics or controlled substances are refilled after noon on Fridays or by on call physicians.  By law, narcotics must be electronically prescribed.  A 30 day supply with no refills is the maximum allowed.  If your prescription is due for a refill, you may be due for a follow up appointment.  To best provide you care, patients receiving routine medications need to be seen at least once a year.  Patients receiving narcotic/controlled substance medications need to be seen at least once every 3 months.  In the event that your preferred pharmacy does not have the requested medication in stock (e.g. Backordered), it is your responsibility to find another pharmacy that has the requested medication available.  We will gladly send a new prescription to that pharmacy at your request.    Scheduling Tests:    If your physician has ordered radiology tests such as MRI or CT scans, please contact Central Scheduling at [phone] right away to schedule the test.  Once scheduled, the [facility] Centralized Referral Team will work with your insurance carrier to obtain pre-certification or prior authorization.  Depending on your insurance carrier, approval may take 3-10 days.  It is highly recommended patients assure they have received an authorization before having a test performed.  If test is done without insurance authorization, patient may be responsible for the entire amount billed.      Precertification and Prior Authorizations:  If your physician has recommended that you have a procedure or additional testing performed the [facility]  Centralized Referral Team will contact your insurance carrier to obtain pre-certification or prior authorization.    You are strongly encouraged to contact your insurance carrier to verify that your procedure/test has been approved and is a COVERED benefit.  Although the [facility] Centralized Referral Team does its due diligence, the insurance carrier gives the disclaimer that \"Although the procedure is authorized, this does not guarantee payment.\"    Ultimately the patient is responsible for payment.   Thank you for your understanding in this matter.  Paperwork Completion:  If you require FMLA or disability paperwork for your recovery, please make sure to either drop it off or have it faxed to our office at [phone]. Be sure the form has your name and date of birth on it.  The form will be faxed to our Forms Department and they will complete it for you.  There is a 25$ fee for all forms that need to be filled out.  Please be aware there is a 10-14 day turnaround time.  You will need to sign a release of information (TORY) form if your paperwork does not come with one.  You may call the Forms Department with any questions at [phone].  Their fax number is [phone].

## 2024-06-12 NOTE — PROGRESS NOTES
Patient presents in office today with reported tightness in Right leg to her foot.     Current pain level reported = 5/10    Last reported dose of Advil and tylenol this morning       Narcotic Contract renewal NA    Patient last seen 04.10.24 for LT trans L4/5 & L5/S1 F/U 50% improvement.

## 2024-08-27 ENCOUNTER — OFFICE VISIT (OUTPATIENT)
Dept: PAIN CLINIC | Facility: HOSPITAL | Age: 89
End: 2024-08-27
Attending: ANESTHESIOLOGY
Payer: MEDICARE

## 2024-08-27 VITALS
WEIGHT: 112 LBS | DIASTOLIC BLOOD PRESSURE: 82 MMHG | SYSTOLIC BLOOD PRESSURE: 141 MMHG | BODY MASS INDEX: 20 KG/M2 | HEART RATE: 75 BPM | OXYGEN SATURATION: 97 %

## 2024-08-27 DIAGNOSIS — M54.16 RIGHT LUMBAR RADICULOPATHY: ICD-10-CM

## 2024-08-27 DIAGNOSIS — M48.062 SPINAL STENOSIS OF LUMBAR REGION WITH NEUROGENIC CLAUDICATION: Primary | ICD-10-CM

## 2024-08-27 PROCEDURE — 99211 OFF/OP EST MAY X REQ PHY/QHP: CPT

## 2024-08-27 NOTE — PATIENT INSTRUCTIONS
Refill policies:    Allow 2-3 business days for refills; controlled substances may take longer.  Contact your pharmacy at least 5 days prior to running out of medication and have them send an electronic request or submit request through the “request refill” option in your [employer] account.  Refills are not addressed on weekends; covering physicians do not authorize routine medications on weekends.  No narcotics or controlled substances are refilled after noon on Fridays or by on call physicians.  By law, narcotics must be electronically prescribed.  A 30 day supply with no refills is the maximum allowed.  If your prescription is due for a refill, you may be due for a follow up appointment.  To best provide you care, patients receiving routine medications need to be seen at least once a year.  Patients receiving narcotic/controlled substance medications need to be seen at least once every 3 months.  In the event that your preferred pharmacy does not have the requested medication in stock (e.g. Backordered), it is your responsibility to find another pharmacy that has the requested medication available.  We will gladly send a new prescription to that pharmacy at your request.    Scheduling Tests:    If your physician has ordered radiology tests such as MRI or CT scans, please contact Central Scheduling at [phone] right away to schedule the test.  Once scheduled, the [facility] Centralized Referral Team will work with your insurance carrier to obtain pre-certification or prior authorization.  Depending on your insurance carrier, approval may take 3-10 days.  It is highly recommended patients assure they have received an authorization before having a test performed.  If test is done without insurance authorization, patient may be responsible for the entire amount billed.      Precertification and Prior Authorizations:  If your physician has recommended that you have a procedure or additional testing performed the [facility]  Centralized Referral Team will contact your insurance carrier to obtain pre-certification or prior authorization.    You are strongly encouraged to contact your insurance carrier to verify that your procedure/test has been approved and is a COVERED benefit.  Although the [facility] Centralized Referral Team does its due diligence, the insurance carrier gives the disclaimer that \"Although the procedure is authorized, this does not guarantee payment.\"    Ultimately the patient is responsible for payment.   Thank you for your understanding in this matter.  Paperwork Completion:  If you require FMLA or disability paperwork for your recovery, please make sure to either drop it off or have it faxed to our office at [phone]. Be sure the form has your name and date of birth on it.  The form will be faxed to our Forms Department and they will complete it for you.  There is a 25$ fee for all forms that need to be filled out.  Please be aware there is a 10-14 day turnaround time.  You will need to sign a release of information (TORY) form if your paperwork does not come with one.  You may call the Forms Department with any questions at [phone].  Their fax number is [phone].

## 2024-08-27 NOTE — PROGRESS NOTES
Patient presents in office today with reported tightness in Right leg to her foot.      Current pain level reported = 5/10     Last reported dose of Advil and Tylenol this morning         Narcotic Contract renewal NA    Increased pain.  Would like to discuss an injection.

## 2024-08-27 NOTE — CHRONIC PAIN
Follow-up Note  Interim note for 8/27/24: Ms. Giraldo has [facility] to the [facility] for pain management due to a persistent complaint of bilateral radiating pain down her right leg to her ankle.  She reports she has had right IT band tightness and tightness down her leg and her right calf which she has been suffering from for greater than a year but now she is reporting a sciatic pain from her low back buttocks and down her leg..  She she continues to report no  left leg sciatica which she was treated for with a lumbar epidural steroid injection followed by a left transforaminal injection at 2 levels.  She was placed on tizanidine and baclofen for trial but did not obtain any relief of her tightness in her leg.  She reports that she obtains most of her relief in the past by dry needling as well as in a occasionally had a IT band injection by Dr. Bolton orthopedic surgeon.  At this time she would like to try an epidural for her right radicular leg pain.   Patient reports that she has tried gabapentin in the past and has not been beneficial for the leg tightness or pain.  She denies any recent trauma or falls or any change in bowel or bladder habits.        HISTORY OF PRESENT ILLNESS:  Christina Giraldo is a 89 year old old female, originally referred to the pain clinic during her hospitalization in January 18, 2024 at which time she underwent a lumbar epidural steroid injection interlaminar at L4-5 for low back pain and neurogenic claudication.  She now presents for follow-up in the pain clinic reporting at least 50% relief of her lower back symptoms and right leg pain but still has persistent left radicular leg pain with ambulation which was successfully treated with injections..  1. Spinal stenosis of lumbar region with neurogenic claudication    2. Right lumbar radiculopathy    Overall the patient feels that she is doing much better with her left leg pain but now her right leg radicular pain seems to  inhibit her activities of daily living.. The pain radiates into the right lower extremity all the way to the foot and ankle.  No recent injury/trauma.   Pt denies persistent numbness/tingling/weakness.  There is no incontinence of bowel/bladder. Coughing/sneezing/straining does not exacerbate the pain.       ALLERGIES:  Allergies   Allergen Reactions    Cephalexin OTHER (SEE COMMENTS)     Pt does not remember.     Codeine [Opioid Analgesics] NAUSEA AND VOMITING    Etodolac OTHER (SEE COMMENTS)    Morphine NAUSEA AND VOMITING    Terbinafine OTHER (SEE COMMENTS)     Medication was not working.    Tramadol NAUSEA AND VOMITING    Ciprofloxacin DIZZINESS    Lamisil DIARRHEA    Omeprazole NAUSEA AND VOMITING       MEDICATION LIST:  Current Outpatient Medications   Medication Sig Dispense Refill    Cholecalciferol (VITAMIN D) 50 MCG (2000 UT) Oral Tab Take by mouth.      Multiple Vitamins-Minerals (BIOTIN PLUS/CALCIUM/VIT D3) Oral Tab Take by mouth.      metoprolol tartrate 25 MG Oral Tab Take 1 tablet (25 mg total) by mouth 2x Daily(Beta Blocker). 60 tablet 3    nitrofurantoin monohydrate macro 100 MG Oral Cap Take 1 capsule (100 mg total) by mouth daily.      Polyethylene Glycol 3350 (MIRALAX OR) Take by mouth as needed.      levothyroxine 75 MCG Oral Tab Take 1 tablet (75 mcg total) by mouth every morning before breakfast. (Patient taking differently: Take 88 mcg by mouth every morning before breakfast.) 90 tablet 3    famotidine 20 MG Oral Tab Take 1 tablet (20 mg total) by mouth 2 (two) times daily. 180 tablet 3    acetaminophen 500 MG Oral Tab Take 2 tablets (1,000 mg total) by mouth every 6 (six) hours as needed for Pain.      Denosumab 60 MG/ML Subcutaneous Solution Prefilled Syringe Inject 1 mL (60 mg total) into the skin once. Historical documentation - see Epic Immunization Activity for administration details 1 mL 0    Estradiol (VAGIFEM) 10 MCG Vaginal Tab Place 10 mcg vaginally 3 (three) times a week. 48  tablet 3    ondansetron 4 MG Oral Tablet Dispersible Take 1 tablet (4 mg total) by mouth every 8 (eight) hours as needed for Nausea. 5 tablet 3    cetirizine (ZYRTEC ALLERGY) 10 MG Oral Tab Take 1 tablet by mouth daily as needed for Allergies. 15 tablet 11      REVIEW OF SYSTEMS:   Bowel/Bladder Incontinence: none  Coughing/sneezing/straining does not exacerbate the pain.  Numbness/tingling: as above  Weakness: as above  Weight Loss: Negative   Fever: Negative   Cardiovascular:  No current chest pain or palpitations   Respiratory:  No current shortness of breath   Gastrointestinal:  No active ulcer  Genitourinary:  Negative  Integumentary :  Negative  Psychiatric:  Negative  Hematologic: No active bleeding  Lymphatic: No current lymphedema  Allergic/Immunologic:  Negative  Musculoskeletal: As above  Neurological: As above  Denies chest pain, shortness of breath.    MEDICAL HISTORY:  Patient Active Problem List   Diagnosis    Hypothyroidism, unspecified type    Recurrent UTI    H/O: hysterectomy    Factor V Leiden mutation ([facility])    Compression fracture    Anemia    Actinic keratosis    PAF (paroxysmal atrial fibrillation) ([facility])    Senile osteoporosis    Pathologic fracture of vertebrae    L3-4 stable>L4-5 unstable grade1 spondylolisthesis    L5-S1 left mod-large/right mod far lateral, L4-5 mod left & left foraminal, L3-4 mod diffuse, L2-3 left mild-mod foraminal, L1-2 mod diffuse bulging discs    L3-4 mild-mod, L4-5 mod central stenosis    Bilateral neck pain    Right hand weakness    bilateral C5-6 radiculopathies    C6-7 left mild-mod, C5-6 right mild-mod paracentral/right mod foraminal, C3-4 mild-mod central & right foraminal2-3 right mild paracentral bulging discs    C4-5 right mod HNP    C6-7 left mod foraminal, C4-5 mild central stenosis    Thoracic back pain    Other closed displaced fracture of proximal end of left humerus    Tear of left rotator cuff, unspecified tear extent    Vitamin D deficiency    Other  closed intra-articular fracture of distal end of right radius, initial encounter    Other closed intra-articular fracture of distal end of right radius with malunion, subsequent encounter    Right wrist pain    SX/GLOBAL/  /EOSC/RIGHT EXTENSOR TENOYYSIS AND TENOSYNOVECTOMY 4TH EXTENSOR COMPARTMENT / DOS 17 / EXP 17    Ruptured extensor tendon of hand or wrist, right, initial encounter    Chronic left-sided low back pain with left-sided sciatica    Chronic left sacroiliac joint pain    left L5 radiculopathy    L3-4 & L4-5 mild grade 1 spondylolisthesis that are mildly unstable    L5-S1 left mod-severe/right mild-mod foraminal stenosis    L5 chronic and L2 chronic compression fractures with L2 vertebral augmentation    T12 chronic compression fracture and s/p augmentation    Lumbar spinal stenosis    Lumbar radiculitis    Cramp of extremity    Gastroesophageal reflux disease without esophagitis    Hoarseness of voice    Protein-calorie malnutrition, unspecified severity (HCC)    Hypokalemia    Azotemia    Atrial fibrillation with rapid ventricular response (HCC)    Nausea and vomiting, unspecified vomiting type    Sciatica, unspecified laterality     Past Medical History:    Blood disorder    Cataract    Disorder of thyroid    Factor V Leiden mutation (HCC)    High cholesterol    Osteopenia    OTHER DISEASES    pancreatitis X 2    Problems with swallowing    Recurrent urinary tract infection       SURGICAL HISTORY:  Past Surgical History:   Procedure Laterality Date    Cataract      Cholecystectomy      because of pancreatic pseudocyst which was also removed.    Colonoscopy       - had upper and lower endoscopy; ; Dr Lagunas because of anemia - normal.    Excisional biospy right  1973    Hysterectomy      ORA/BSO in 's    Ir vertebroplasty            Other surgical history  11    cysto-dr. gerard       FAMILY HISTORY:  Family History   Problem Relation Age of Onset    Other  (Other) Father         cva    Cancer Mother 80        breast cancer    Breast Cancer Mother 80        80    Cancer Other         breast    Breast Cancer Maternal Aunt 82        82       SOCIAL HISTORY:  Social History     Socioeconomic History    Marital status:      Spouse name: Not on file    Number of children: Not on file    Years of education: Not on file    Highest education level: Not on file   Occupational History    Not on file   Tobacco Use    Smoking status: Never    Smokeless tobacco: Never   Substance and Sexual Activity    Alcohol use: No     Alcohol/week: 0.0 standard drinks of alcohol    Drug use: No    Sexual activity: Not on file   Other Topics Concern     Service Not Asked    Blood Transfusions Not Asked    Caffeine Concern Yes     Comment: 1 cup of coffee daily    Occupational Exposure Not Asked    Hobby Hazards Not Asked    Sleep Concern Not Asked    Stress Concern Not Asked    Weight Concern Not Asked    Special Diet Not Asked    Back Care Not Asked    Exercise Yes    Bike Helmet Not Asked    Seat Belt Not Asked    Self-Exams Not Asked   Social History Narrative    The patient does not use an assistive device..      The patient does live in a home with stairs.     Social Determinants of Health     Financial Resource Strain: Not on file   Food Insecurity: No Food Insecurity (1/17/2024)    Food Insecurity     Food Insecurity: Never true   Transportation Needs: No Transportation Needs (1/17/2024)    Transportation Needs     Lack of Transportation: No   Physical Activity: Not on file   Stress: Not on file   Social Connections: Not on file   Housing Stability: Low Risk  (1/17/2024)    Housing Stability     Housing Instability: No     Housing Instability Emergency: Not on file     Crib or Bassinette: Not on file     PHYSICAL EXAMINATION:  Vitals:    08/27/24 0831   BP: 141/82   Pulse: 75      General: Alert and oriented x3  Affect:  NAD  Eyes: anicteric; no injection  Gait: Antalgic;   cane user - Yes  Spine: Normal  SLR: Positive left      IMAGING:  Narrative   PROCEDURE: CT SPINE LUMBAR (CPT=72131)     COMPARISON: [facility], CT SPINE LUMBAR (CPT=72131), 11/22/2023, 9:43 AM.     INDICATIONS: low back and pelvic pain     TECHNIQUE:   Multi-planar CT images were obtained without intravenous contrast material.  Automated exposure control for dose reduction was used. Adjustment of the mA and/or kV was done based on the patient's size. Use of iterative reconstruction  technique for dose reduction was used.  Dose information is transmitted to the ACR (American College of Radiology) NRDR (National Radiology Data Registry) which includes the Dose Index Registry.        FINDINGS:  PARASPINAL AREA: Normal with no visible mass.    BONES:   No acute fracture or subluxation.  Moderate chronic L5 vertebral compression fracture.  Moderate L2 compression fracture post kyphoplasty.  And partially visualized chronic T12 compression fracture post kyphoplasty.  Osteoporosis.  Mild  bilateral SI joint degenerative change.  ALIGNMENT:   Normal.       LUMBAR DISC LEVELS:  L1-L2: Mild central and foraminal narrowing related to the L2 compression fracture.  L2-L3: Decrease in disc height with a spondylotic disc bulge.  Mild facet arthrosis.  Mild bilateral foraminal narrowing.  No significant central or lateral narrowing.  L3-L4: Advanced facet arthrosis.  Ligamentum flavum hypertrophy and calcification.  Spondylotic disc bulge.  Moderate central narrowing and mild foraminal narrowing.  L4-L5: Advanced facet arthrosis.  Ligamentum flavum hypertrophy.  A partially rim calcified right-sided synovial cyst along the ligamentum flavum and anterior facet joint capsule displaces the thecal sac.  Moderate to severe right lateral narrowing and  asymmetric central narrowing.  Mild bilateral foraminal narrowing.  L5-S1: Advanced facet arthrosis.  Disc degeneration with spondylotic disc bulge.  Mild central and  lateral narrowing.  Moderate bilateral foraminal narrowing..     OTHER: Atherosclerotic vascular calcification.               Impression   CONCLUSION:  1. No acute fracture or subluxation.  Osteoporosis  2. Chronic L5, L2 and T12 vertebral compression fractures.  Post kyphoplasty at T12 and L2.  3. Multilevel disc and facet degeneration.  4. L4-L5:  Moderate to severe right lateral narrowing and asymmetric central narrowing.  5. L3-4:  Moderate central narrowing.  6. L5-S1:  Moderate bilateral foraminal narrowing.  Mild central and lateral narrowing.              Dictated by (CST): Vincent Pena MD on 1/03/2024 at 3:05 PM      Finalized by (CST): Vincent Pena MD on 1/03/2024 at 3:12 PM       PROCEDURE: MRI SPINE LUMBAR (CPT=72148)     COMPARISON: [facility], CT ABDOMEN PELVIS IV CONTRAST NO ORAL (ER), 1/17/2024, 1:45 PM.  [facility], MRI SPINE LUMBAR (CPT=72148), 2/24/2017, 11:20 AM.     INDICATIONS: lumbar radiculopathy     TECHNIQUE: A variety of imaging planes and parameters were utilized for visualization of suspected pathology.     FINDINGS:  NUMERATION: For the purposes of this examination, the lowest fully formed disc space is designated L5-S1.    BONES: Moderate chronic fracture deformity T12 status post kyphoplasty.  Mild-to-moderate chronic fracture deformity L2 status post kyphoplasty.  Moderate chronic appearing fracture deformity L5.  No acute fracture or osseous malalignment.  Multilevel  degenerative change with marginal osteophyte formation and facet hypertrophy.  Bone marrow signal is otherwise unremarkable.  CORD/CAUDA EQUINA: Normal caliber, contour, and signal intensity.  The conus terminates at T12.  PARASPINAL AREA: Unremarkable.    OTHER: Bilateral small T2 hyperintense foci in the kidneys incompletely characterized but probably representing small cysts.  Nonspecific urinary bladder distension.     LUMBAR DISC LEVELS:  L1-L2: Circumferential disc  bulge with small central superimposed protrusion.  There is ligamentum flavum hypertrophy.  Mild central canal narrowing.  Moderate bilateral foraminal narrowing noted with facet arthropathy.  Similar findings were seen  previously.  L2-L3: Circumferential disc bulge with ligamentum flavum hypertrophy.  Mild central canal narrowing.  Moderate bilateral foraminal narrowing right greater than left with facet arthropathy.  Overall findings are mildly progressed.  L3-L4: Circumferential disc bulge with ligamentum flavum hypertrophy.  There is mild-to-moderate central canal narrowing mildly progressed.  Mild bilateral foraminal narrowing with facet arthropathy.  L4-L5: Circumferential disc bulge with ligamentum flavum hypertrophy.  Severe central canal narrowing.  This is progressed since the prior study.  Mild-to-moderate bilateral foraminal narrowing with facet arthropathy.  L5-S1: Circumferential disc bulge with ligamentum flavum hypertrophy.  Moderate central canal narrowing is mildly progressed.  Left greater than right bilateral moderate foraminal narrowing facet arthropathy.               Impression   CONCLUSION:     Chronic T12, L2 and L5 vertebral compression deformity status post T12 and L2 kyphoplasties.  No acute fracture or osseous malalignment.     Mildly progressed multilevel lumbar spine degenerative changes described above.  The most significant level represents L4-L5 where there is severe central canal narrowing and mild-to-moderate bilateral foraminal narrowing.     Lesser incidental findings as above.     A preliminary report was issued by the [facility] teleradiology service. There are no major discrepancies.           Dictated by (CST): Jose Maria Murray MD on 1/18/2024 at 6:14 AM      Finalized by (CST): Jose Maria Murray MD on 1/18/2024 at 6:23 AM             [place] PHYSICIAN MONITORING PROGRAM REVIEWED  No    ASSESSMENT:   Christina Giraldo is a 89 year old  female, with   1. Spinal  stenosis of lumbar region with neurogenic claudication    2. Right lumbar radiculopathy      Who previously has responded to epidural and transforaminal epidurals in the past .  The patient has a new right radicular leg pain which has not responded to conservative therapies.  PLAN:  RECOMMENDATIONS:  #1  Right transforaminal epidural steroid injection L4-5 with fluoroscopic guidance  - Continue medications as prescribed. Discussed with patient that his medication doses currently exceed the recommended dosages. He verbalizes understanding. Discussed with patient the risks of opioid medications including but not limited to dependence, addiction, respiratory depression, and death.  Patient advised not to consume ETOH or operating heavy machinery or vehicles while taking this medication.  Patient verbalized understanding.    Comprehensive analgesic plan was formulated. Conservative vs. Aggressive measures were discussed at length including pharmacotherapy (eg. Anti- inflammatories, muscle relaxants, neuropathic medications, oral steroids, analgesics), injections, and further testing. Risks and benefits of all options were discussed at length to patients satisfaction during a comprehensive interactive discussion. All questions were answered during extended questions and answer session. Patient agreeable to discussion plan. Greater than 50% of the time was spent with counseling (nature of discussion centered around pain, therapy, and treatment options), face to face time, time spent reviewing data, obtaining patient information and discussing the care with the patients health care providers.     Pt will return to clinic 2 weeks postinjection    Total time:25 minutes

## 2024-09-13 ENCOUNTER — TELEPHONE (OUTPATIENT)
Dept: PAIN CLINIC | Facility: HOSPITAL | Age: 89
End: 2024-09-13

## 2024-09-13 NOTE — TELEPHONE ENCOUNTER
Patient states she is having significant pain in both legs now and wondering if Dr. Young would change the injection order to help both of her legs. Please advise.

## 2024-09-16 NOTE — TELEPHONE ENCOUNTER
Anup Young MD  You3 days ago     AB  Change procedure to LESI L4/5       Call placed to patient to schedule. Left voicemail to return call.

## 2024-10-07 ENCOUNTER — HOSPITAL ENCOUNTER (EMERGENCY)
Facility: HOSPITAL | Age: 89
Discharge: HOME | End: 2024-10-07
Attending: EMERGENCY MEDICINE
Payer: MEDICARE

## 2024-10-07 ENCOUNTER — APPOINTMENT (OUTPATIENT)
Dept: CT IMAGING | Facility: HOSPITAL | Age: 89
End: 2024-10-07
Attending: EMERGENCY MEDICINE
Payer: MEDICARE

## 2024-10-07 VITALS
SYSTOLIC BLOOD PRESSURE: 166 MMHG | WEIGHT: 120 LBS | BODY MASS INDEX: 21 KG/M2 | DIASTOLIC BLOOD PRESSURE: 72 MMHG | OXYGEN SATURATION: 96 % | RESPIRATION RATE: 18 BRPM | HEART RATE: 84 BPM | TEMPERATURE: 97 F

## 2024-10-07 DIAGNOSIS — S09.90XA INJURY OF HEAD, INITIAL ENCOUNTER: ICD-10-CM
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
## 2024-10-07 PROCEDURE — 99283 EMERGENCY DEPT VISIT LOW MDM: CPT

## 2024-10-07 PROCEDURE — 99284 EMERGENCY DEPT VISIT MOD MDM: CPT

## 2024-10-07 PROCEDURE — 70450 CT HEAD/BRAIN W/O DYE: CPT | Performed by: EMERGENCY MEDICINE

## 2024-10-07 NOTE — ED PROVIDER NOTES
Patient Seen in: [facility] Emergency Department      History     Chief Complaint   Patient presents with    Fall     Stated Complaint: fall - loc -bld thinners    Subjective:   HPI        Objective:     Past Medical History:    Blood disorder    Cataract    Disorder of thyroid    Factor V Leiden mutation (HCC)    High cholesterol    Osteopenia    OTHER DISEASES    pancreatitis X 2    Problems with swallowing    Recurrent urinary tract infection              Past Surgical History:   Procedure Laterality Date    Cataract      Cholecystectomy      because of pancreatic pseudocyst which was also removed.    Colonoscopy       - had upper and lower endoscopy; ; Dr Lagunas because of anemia - normal.    Excisional biospy right  1973    Hysterectomy      ORA/BSO in     Ir vertebroplasty            Other surgical history  11    cysto-dr. gerard                Social History     Socioeconomic History    Marital status:    Tobacco Use    Smoking status: Never    Smokeless tobacco: Never   Substance and Sexual Activity    Alcohol use: No     Alcohol/week: 0.0 standard drinks of alcohol    Drug use: No   Other Topics Concern    Caffeine Concern Yes     Comment: 1 cup of coffee daily    Exercise Yes   Social History Narrative    The patient does not use an assistive device..      The patient does live in a home with stairs.     Social Determinants of Health     Food Insecurity: No Food Insecurity (2024)    Food Insecurity     Food Insecurity: Never true   Transportation Needs: No Transportation Needs (2024)    Transportation Needs     Lack of Transportation: No   Housing Stability: Low Risk  (2024)    Housing Stability     Housing Instability: No                  Physical Exam     ED Triage Vitals [10/07/24 1516]   BP (!) 183/81   Pulse 95   Resp 18   Temp 96.8 °F (36 °C)   Temp src Temporal   SpO2 98 %   O2 Device None (Room air)       Current Vitals:   Vital Signs  BP: (!)  166/72  Pulse: 84  Resp: 18  Temp: 96.8 °F (36 °C)  Temp src: Temporal  MAP (mmHg): 97    Oxygen Therapy  SpO2: 96 %  O2 Device: None (Room air)        Physical Exam        ED Course   Labs Reviewed - No data to display                MDM      89-year-old female with a history of osteopenia, arthritis, and several other nonacute medical problems presents today after a fall.  Patient states about 2 hours ago, she lost her balance and tripped on a curb.  She scraped her bilateral elbows and did strike the back of her head though denies loss of consciousness.  Takes no blood thinners.  Currently, states there is no significant pain at rest but has some pain with palpation of the posterior head.  She initially presented to immediate care for evaluation and was referred to the ED for head CT.  Patient denies any significant preceding symptoms including lightheadedness, chest pain, palpitations, or recent illness, medication changes, or abnormal ingestions.    On exam, slightly hypertensive with otherwise normal vitals, GCS 15, PERRLA, EOMI, some tenderness to palpation of the occiput with a small amount of swelling but no palpable cranial deformity.  Mild abrasion on the bilateral elbows without tenderness to palpation or deformity.    Differential: Fall, head injury    Plan evaluate with CT head to rule out fracture or intracranial hemorrhage.    I personally reviewed and interpreted the patient's noncontrast head CT and agree with radiologist read of no acute findings.    On reexamination, patient remains well-appearing and asymptomatic.  Will discharge with return precautions.                    Medical Decision Making      Disposition and Plan     Clinical Impression:  1. Fall, initial encounter    2. Injury of head, initial encounter         Disposition:  Discharge  10/7/2024  6:25 pm    Follow-up:  Marce Smith MD    Follow up  As needed    We recommend that you schedule follow up care with a primary care  provider within the next three months to obtain basic health screening including reassessment of your blood pressure.      Medications Prescribed:  Discharge Medication List as of 10/7/2024  6:26 PM              Supplementary Documentation:

## 2024-10-07 NOTE — ED INITIAL ASSESSMENT (HPI)
Patient complains of mechanical fall x 2 hours ago, tripped on curb, hit back of her head on pavement. Went to IC who told her to come here for CT. Denies headache but does complain of mild pain to the site where she hit her head. No bleeding noted. Is not on blood thinners

## 2024-10-16 ENCOUNTER — OFFICE VISIT (OUTPATIENT)
Dept: PAIN CLINIC | Facility: HOSPITAL | Age: 89
End: 2024-10-16
Attending: ANESTHESIOLOGY
Payer: MEDICARE

## 2024-10-16 VITALS
DIASTOLIC BLOOD PRESSURE: 78 MMHG | SYSTOLIC BLOOD PRESSURE: 137 MMHG | HEART RATE: 78 BPM | OXYGEN SATURATION: 95 % | BODY MASS INDEX: 21 KG/M2 | WEIGHT: 120 LBS

## 2024-10-16 DIAGNOSIS — M54.16 LUMBAR RADICULOPATHY: ICD-10-CM

## 2024-10-16 DIAGNOSIS — M48.062 SPINAL STENOSIS OF LUMBAR REGION WITH NEUROGENIC CLAUDICATION: Primary | ICD-10-CM

## 2024-10-16 DIAGNOSIS — M54.16 LUMBAR BACK PAIN WITH RADICULOPATHY AFFECTING LEFT LOWER EXTREMITY: ICD-10-CM

## 2024-10-16 DIAGNOSIS — M54.16 RIGHT LUMBAR RADICULOPATHY: ICD-10-CM

## 2024-10-16 DIAGNOSIS — S32.000S COMPRESSION FRACTURE OF LUMBAR VERTEBRA, UNSPECIFIED LUMBAR VERTEBRAL LEVEL, SEQUELA: ICD-10-CM

## 2024-10-16 PROCEDURE — 99211 OFF/OP EST MAY X REQ PHY/QHP: CPT

## 2024-10-16 RX ORDER — PREGABALIN 25 MG/1
25 CAPSULE ORAL 2 TIMES DAILY
Qty: 60 CAPSULE | Refills: 0 | Status: SHIPPED | OUTPATIENT
Start: 2024-10-16 | End: 2024-11-15

## 2024-10-16 NOTE — CHRONIC PAIN
Follow-up Note  Interim note for 10/16/2024: Ms. Giraldo has [facility] back to the [facility] for [facility] after undergoing a lumbar epidural steroid injection L4-5 on 9/30/2024 she reports that she had obtained majority of her relief in her right leg where she only has partial tingling left in her right foot but unfortunately she feels more pain in her left leg now especially with ambulation.  She did report a fall on October 7 where she tripped on a curb and fell backwards hitting her head she was seen in the emergency room and her head was scanned and found to be unremarkable.  We have discussed the fact that we did a transforaminal injection on the left and April that did give her relief to her left leg pain but unfortunately it is now returned.  She has had a total of 4 epidural injections this year.  She is looking for alternatives we have briefly discussed the use of spinal cord stimulation trial.  She is also interested in possibility of Vertiflex as she has heard this through her physical therapist.  At this time she is healing from her fall and denies any additional changes in her lower extremities or change in her bowel or bladder habits.  Interim note for 8/27/24: Ms. Giraldo has returned back to the Center for pain management due to a persistent complaint of bilateral radiating pain down her right leg to her ankle.  She reports she has had right IT band tightness and tightness down her leg and her right calf which she has been suffering from for greater than a year but now she is reporting a sciatic pain from her low back buttocks and down her leg..  She she continues to report no  left leg sciatica which she was treated for with a lumbar epidural steroid injection followed by a left transforaminal injection at 2 levels.  She was placed on tizanidine and baclofen for trial but did not obtain any relief of her tightness in her leg.  She reports that she obtains most of her relief in the past by dry needling as  well as in a occasionally had a IT band injection by Dr. Bolton orthopedic surgeon.  At this time she would like to try an epidural for her right radicular leg pain.   Patient reports that she has tried gabapentin in the past and has not been beneficial for the leg tightness or pain.  She denies any recent trauma or falls or any change in bowel or bladder habits.        HISTORY OF PRESENT ILLNESS:  Christina Giraldo is a 89 year old old female, originally referred to the pain clinic during her hospitalization in January 18, 2024 at which time she underwent a lumbar epidural steroid injection interlaminar at L4-5 for low back pain and neurogenic claudication.  She now presents for follow-up in the pain clinic reporting at least 50% relief of her lower back symptoms and right leg pain but still has persistent left radicular leg pain with ambulation which was successfully treated with injections..  1. Spinal stenosis of lumbar region with neurogenic claudication    2. Right lumbar radiculopathy    3. left L5 radiculopathy    4. Lumbar back pain with radiculopathy affecting left lower extremity    5. Compression fracture of lumbar vertebra, unspecified lumbar vertebral level, sequela    Overall the patient feels that she is doing much better with her left leg pain but now her right leg radicular pain seems to inhibit her activities of daily living.. The pain radiates into the right lower extremity all the way to the foot and ankle.  No recent injury/trauma.   Pt denies persistent numbness/tingling/weakness.  There is no incontinence of bowel/bladder. Coughing/sneezing/straining does not exacerbate the pain.       ALLERGIES:  Allergies   Allergen Reactions    Cephalexin OTHER (SEE COMMENTS)     Pt does not remember.     Codeine [Opioid Analgesics] NAUSEA AND VOMITING    Etodolac OTHER (SEE COMMENTS)    Morphine NAUSEA AND VOMITING    Terbinafine OTHER (SEE COMMENTS)     Medication was not working.    Tramadol NAUSEA  AND VOMITING    Ciprofloxacin DIZZINESS    Lamisil DIARRHEA    Omeprazole NAUSEA AND VOMITING       MEDICATION LIST:  Current Outpatient Medications   Medication Sig Dispense Refill    Cholecalciferol (VITAMIN D) 50 MCG (2000 UT) Oral Tab Take by mouth.      Multiple Vitamins-Minerals (BIOTIN PLUS/CALCIUM/VIT D3) Oral Tab Take by mouth.      Polyethylene Glycol 3350 (MIRALAX OR) Take by mouth as needed.      levothyroxine 75 MCG Oral Tab Take 1 tablet (75 mcg total) by mouth every morning before breakfast. (Patient taking differently: Take 88 mcg by mouth every morning before breakfast.) 90 tablet 3    acetaminophen 500 MG Oral Tab Take 2 tablets (1,000 mg total) by mouth every 6 (six) hours as needed for Pain.      Denosumab 60 MG/ML Subcutaneous Solution Prefilled Syringe Inject 1 mL (60 mg total) into the skin once. Historical documentation - see Epic Immunization Activity for administration details 1 mL 0    Estradiol (VAGIFEM) 10 MCG Vaginal Tab Place 10 mcg vaginally 3 (three) times a week. 48 tablet 3    ondansetron 4 MG Oral Tablet Dispersible Take 1 tablet (4 mg total) by mouth every 8 (eight) hours as needed for Nausea. 5 tablet 3    cetirizine (ZYRTEC ALLERGY) 10 MG Oral Tab Take 1 tablet by mouth daily as needed for Allergies. 15 tablet 11    metoprolol tartrate 25 MG Oral Tab Take 1 tablet (25 mg total) by mouth 2x Daily(Beta Blocker). (Patient not taking: Reported on 10/16/2024) 60 tablet 3    nitrofurantoin monohydrate macro 100 MG Oral Cap Take 1 capsule (100 mg total) by mouth daily. (Patient not taking: Reported on 10/16/2024)      famotidine 20 MG Oral Tab Take 1 tablet (20 mg total) by mouth 2 (two) times daily. (Patient not taking: Reported on 10/16/2024) 180 tablet 3      REVIEW OF SYSTEMS:   Bowel/Bladder Incontinence: none  Coughing/sneezing/straining does not exacerbate the pain.  Numbness/tingling: as above  Weakness: as above  Weight Loss: Negative   Fever: Negative   Cardiovascular:  No  current chest pain or palpitations   Respiratory:  No current shortness of breath   Gastrointestinal:  No active ulcer  Genitourinary:  Negative  Integumentary :  Negative  Psychiatric:  Negative  Hematologic: No active bleeding  Lymphatic: No current lymphedema  Allergic/Immunologic:  Negative  Musculoskeletal: As above  Neurological: As above  Denies chest pain, shortness of breath.    MEDICAL HISTORY:  Patient Active Problem List   Diagnosis    Hypothyroidism, unspecified type    Recurrent UTI    H/O: hysterectomy    Factor V Leiden mutation ([facility])    Compression fracture    Anemia    Actinic keratosis    PAF (paroxysmal atrial fibrillation) ([facility])    Senile osteoporosis    Pathologic fracture of vertebrae    L3-4 stable>L4-5 unstable grade1 spondylolisthesis    L5-S1 left mod-large/right mod far lateral, L4-5 mod left & left foraminal, L3-4 mod diffuse, L2-3 left mild-mod foraminal, L1-2 mod diffuse bulging discs    L3-4 mild-mod, L4-5 mod central stenosis    Bilateral neck pain    Right hand weakness    bilateral C5-6 radiculopathies    C6-7 left mild-mod, C5-6 right mild-mod paracentral/right mod foraminal, C3-4 mild-mod central & right foraminal2-3 right mild paracentral bulging discs    C4-5 right mod HNP    C6-7 left mod foraminal, C4-5 mild central stenosis    Thoracic back pain    Other closed displaced fracture of proximal end of left humerus    Tear of left rotator cuff, unspecified tear extent    Vitamin D deficiency    Other closed intra-articular fracture of distal end of right radius, initial encounter    Other closed intra-articular fracture of distal end of right radius with malunion, subsequent encounter    Right wrist pain    SX/GLOBAL/  /EOSC/RIGHT EXTENSOR TENOYYSIS AND TENOSYNOVECTOMY 4TH EXTENSOR COMPARTMENT / DOS 01/17/17 / EXP 04/17/17    Ruptured extensor tendon of hand or wrist, right, initial encounter    Chronic left-sided low back pain with left-sided sciatica    Chronic left  sacroiliac joint pain    left L5 radiculopathy    L3-4 & L4-5 mild grade 1 spondylolisthesis that are mildly unstable    L5-S1 left mod-severe/right mild-mod foraminal stenosis    L5 chronic and L2 chronic compression fractures with L2 vertebral augmentation    T12 chronic compression fracture and s/p augmentation    Lumbar spinal stenosis    Lumbar radiculitis    Cramp of extremity    Gastroesophageal reflux disease without esophagitis    Hoarseness of voice    Protein-calorie malnutrition, unspecified severity (HCC)    Hypokalemia    Azotemia    Atrial fibrillation with rapid ventricular response (HCC)    Nausea and vomiting, unspecified vomiting type    Sciatica, unspecified laterality     Past Medical History:    Blood disorder    Cataract    Disorder of thyroid    Factor V Leiden mutation (HCC)    High cholesterol    Osteopenia    OTHER DISEASES    pancreatitis X 2    Problems with swallowing    Recurrent urinary tract infection       SURGICAL HISTORY:  Past Surgical History:   Procedure Laterality Date    Cataract      Cholecystectomy      because of pancreatic pseudocyst which was also removed.    Colonoscopy       - had upper and lower endoscopy; ; Dr Lagunas because of anemia - normal.    Excisional biospy right  1973    Hysterectomy      ORA/BSO in 's    Ir vertebroplasty            Other surgical history  11    cysto-dr. gerard       FAMILY HISTORY:  Family History   Problem Relation Age of Onset    Other (Other) Father         cva    Cancer Mother 80        breast cancer    Breast Cancer Mother 80        80    Cancer Other         breast    Breast Cancer Maternal Aunt 82        82       SOCIAL HISTORY:  Social History     Socioeconomic History    Marital status:      Spouse name: Not on file    Number of children: Not on file    Years of education: Not on file    Highest education level: Not on file   Occupational History    Not on file   Tobacco Use    Smoking status: Never     Smokeless tobacco: Never   Substance and Sexual Activity    Alcohol use: No     Alcohol/week: 0.0 standard drinks of alcohol    Drug use: No    Sexual activity: Not on file   Other Topics Concern     Service Not Asked    Blood Transfusions Not Asked    Caffeine Concern Yes     Comment: 1 cup of coffee daily    Occupational Exposure Not Asked    Hobby Hazards Not Asked    Sleep Concern Not Asked    Stress Concern Not Asked    Weight Concern Not Asked    Special Diet Not Asked    Back Care Not Asked    Exercise Yes    Bike Helmet Not Asked    Seat Belt Not Asked    Self-Exams Not Asked   Social History Narrative    The patient does not use an assistive device..      The patient does live in a home with stairs.     Social Drivers of Health     Financial Resource Strain: Not on file   Food Insecurity: No Food Insecurity (1/17/2024)    Food Insecurity     Food Insecurity: Never true   Transportation Needs: No Transportation Needs (1/17/2024)    Transportation Needs     Lack of Transportation: No   Physical Activity: Not on file   Stress: Not on file   Social Connections: Not on file   Housing Stability: Low Risk  (1/17/2024)    Housing Stability     Housing Instability: No     Housing Instability Emergency: Not on file     Crib or Bassinette: Not on file     PHYSICAL EXAMINATION:  Vitals:    10/16/24 1113   BP: 137/78   Pulse: 78      General: Alert and oriented x3  Affect:  NAD  Eyes: anicteric; no injection  Gait: Antalgic;  cane user - Yes  Spine: Normal  SLR: Positive left      IMAGING:  Narrative   PROCEDURE: CT SPINE LUMBAR (CPT=72131)     COMPARISON: [facility], CT SPINE LUMBAR (CPT=72131), 11/22/2023, 9:43 AM.     INDICATIONS: low back and pelvic pain     TECHNIQUE:   Multi-planar CT images were obtained without intravenous contrast material.  Automated exposure control for dose reduction was used. Adjustment of the mA and/or kV was done based on the patient's size. Use of iterative  reconstruction  technique for dose reduction was used.  Dose information is transmitted to the ACR (American College of Radiology) NRDR (National Radiology Data Registry) which includes the Dose Index Registry.        FINDINGS:  PARASPINAL AREA: Normal with no visible mass.    BONES:   No acute fracture or subluxation.  Moderate chronic L5 vertebral compression fracture.  Moderate L2 compression fracture post kyphoplasty.  And partially visualized chronic T12 compression fracture post kyphoplasty.  Osteoporosis.  Mild  bilateral SI joint degenerative change.  ALIGNMENT:   Normal.       LUMBAR DISC LEVELS:  L1-L2: Mild central and foraminal narrowing related to the L2 compression fracture.  L2-L3: Decrease in disc height with a spondylotic disc bulge.  Mild facet arthrosis.  Mild bilateral foraminal narrowing.  No significant central or lateral narrowing.  L3-L4: Advanced facet arthrosis.  Ligamentum flavum hypertrophy and calcification.  Spondylotic disc bulge.  Moderate central narrowing and mild foraminal narrowing.  L4-L5: Advanced facet arthrosis.  Ligamentum flavum hypertrophy.  A partially rim calcified right-sided synovial cyst along the ligamentum flavum and anterior facet joint capsule displaces the thecal sac.  Moderate to severe right lateral narrowing and  asymmetric central narrowing.  Mild bilateral foraminal narrowing.  L5-S1: Advanced facet arthrosis.  Disc degeneration with spondylotic disc bulge.  Mild central and lateral narrowing.  Moderate bilateral foraminal narrowing..     OTHER: Atherosclerotic vascular calcification.               Impression   CONCLUSION:  1. No acute fracture or subluxation.  Osteoporosis  2. Chronic L5, L2 and T12 vertebral compression fractures.  Post kyphoplasty at T12 and L2.  3. Multilevel disc and facet degeneration.  4. L4-L5:  Moderate to severe right lateral narrowing and asymmetric central narrowing.  5. L3-4:  Moderate central narrowing.  6. L5-S1:  Moderate  bilateral foraminal narrowing.  Mild central and lateral narrowing.              Dictated by (CST): Vincent Pena MD on 1/03/2024 at 3:05 PM      Finalized by (CST): Vincent Pena MD on 1/03/2024 at 3:12 PM       PROCEDURE: MRI SPINE LUMBAR (CPT=72148)     COMPARISON: [facility], CT ABDOMEN PELVIS IV CONTRAST NO ORAL (ER), 1/17/2024, 1:45 PM.  [facility], MRI SPINE LUMBAR (CPT=72148), 2/24/2017, 11:20 AM.     INDICATIONS: lumbar radiculopathy     TECHNIQUE: A variety of imaging planes and parameters were utilized for visualization of suspected pathology.     FINDINGS:  NUMERATION: For the purposes of this examination, the lowest fully formed disc space is designated L5-S1.    BONES: Moderate chronic fracture deformity T12 status post kyphoplasty.  Mild-to-moderate chronic fracture deformity L2 status post kyphoplasty.  Moderate chronic appearing fracture deformity L5.  No acute fracture or osseous malalignment.  Multilevel  degenerative change with marginal osteophyte formation and facet hypertrophy.  Bone marrow signal is otherwise unremarkable.  CORD/CAUDA EQUINA: Normal caliber, contour, and signal intensity.  The conus terminates at T12.  PARASPINAL AREA: Unremarkable.    OTHER: Bilateral small T2 hyperintense foci in the kidneys incompletely characterized but probably representing small cysts.  Nonspecific urinary bladder distension.     LUMBAR DISC LEVELS:  L1-L2: Circumferential disc bulge with small central superimposed protrusion.  There is ligamentum flavum hypertrophy.  Mild central canal narrowing.  Moderate bilateral foraminal narrowing noted with facet arthropathy.  Similar findings were seen  previously.  L2-L3: Circumferential disc bulge with ligamentum flavum hypertrophy.  Mild central canal narrowing.  Moderate bilateral foraminal narrowing right greater than left with facet arthropathy.  Overall findings are mildly progressed.  L3-L4: Circumferential disc  bulge with ligamentum flavum hypertrophy.  There is mild-to-moderate central canal narrowing mildly progressed.  Mild bilateral foraminal narrowing with facet arthropathy.  L4-L5: Circumferential disc bulge with ligamentum flavum hypertrophy.  Severe central canal narrowing.  This is progressed since the prior study.  Mild-to-moderate bilateral foraminal narrowing with facet arthropathy.  L5-S1: Circumferential disc bulge with ligamentum flavum hypertrophy.  Moderate central canal narrowing is mildly progressed.  Left greater than right bilateral moderate foraminal narrowing facet arthropathy.               Impression   CONCLUSION:     Chronic T12, L2 and L5 vertebral compression deformity status post T12 and L2 kyphoplasties.  No acute fracture or osseous malalignment.     Mildly progressed multilevel lumbar spine degenerative changes described above.  The most significant level represents L4-L5 where there is severe central canal narrowing and mild-to-moderate bilateral foraminal narrowing.     Lesser incidental findings as above.     A preliminary report was issued by the [employer] teleradiology service. There are no major discrepancies.           Dictated by (CST): Jose Maria Murray MD on 1/18/2024 at 6:14 AM      Finalized by (CST): Jose Maria Murray MD on 1/18/2024 at 6:23 AM             [place] PHYSICIAN MONITORING PROGRAM REVIEWED  No    ASSESSMENT:   Christina Giraldo is a 89 year old  female, with   1. Spinal stenosis of lumbar region with neurogenic claudication    2. Right lumbar radiculopathy    3. left L5 radiculopathy    4. Lumbar back pain with radiculopathy affecting left lower extremity    5. Compression fracture of lumbar vertebra, unspecified lumbar vertebral level, sequela      Who previously has responded to epidural and transforaminal epidurals in the past .  The patient has a new right radicular leg pain which has not responded to conservative therapies including steroid injections at  this time.  She is interested in learning about Vertiflex.  We have also discussed with her the option of spinal cord stimulation today as well.  And she was given a handout.  She is not interested in having surgery with the surgeon as she has already discussed those options in the past.     PLAN:  RECOMMENDATIONS:  Patient to return to pain clinic in 1 month and to see Dr. MATIAS to discuss options of Vertiflex versus my discussion with her about spinal cord stimulation.  2.  In the meantime we will trial low-dose Lyrica as Neurontin was not successful in the past      - Continue medications as prescribed. Discussed with patient that his medication doses currently exceed the recommended dosages. He verbalizes understanding. Discussed with patient the risks of opioid medications including but not limited to dependence, addiction, respiratory depression, and death.  Patient advised not to consume ETOH or operating heavy machinery or vehicles while taking this medication.  Patient verbalized understanding.    Comprehensive analgesic plan was formulated. Conservative vs. Aggressive measures were discussed at length including pharmacotherapy (eg. Anti- inflammatories, muscle relaxants, neuropathic medications, oral steroids, analgesics), injections, and further testing. Risks and benefits of all options were discussed at length to patients satisfaction during a comprehensive interactive discussion. All questions were answered during extended questions and answer session. Patient agreeable to discussion plan. Greater than 50% of the time was spent with counseling (nature of discussion centered around pain, therapy, and treatment options), face to face time, time spent reviewing data, obtaining patient information and discussing the care with the patients health care providers.     Pt will return to clinic 2-4 weeks postinjection    Total time: 33 minutes

## 2024-10-16 NOTE — PATIENT INSTRUCTIONS
Refill policies:    Allow 2-3 business days for refills; controlled substances may take longer.  Contact your pharmacy at least 5 days prior to running out of medication and have them send an electronic request or submit request through the “request refill” option in your [employer] account.  Refills are not addressed on weekends; covering physicians do not authorize routine medications on weekends.  No narcotics or controlled substances are refilled after noon on Fridays or by on call physicians.  By law, narcotics must be electronically prescribed.  A 30 day supply with no refills is the maximum allowed.  If your prescription is due for a refill, you may be due for a follow up appointment.  To best provide you care, patients receiving routine medications need to be seen at least once a year.  Patients receiving narcotic/controlled substance medications need to be seen at least once every 3 months.  In the event that your preferred pharmacy does not have the requested medication in stock (e.g. Backordered), it is your responsibility to find another pharmacy that has the requested medication available.  We will gladly send a new prescription to that pharmacy at your request.    Scheduling Tests:    If your physician has ordered radiology tests such as MRI or CT scans, please contact Central Scheduling at [phone] right away to schedule the test.  Once scheduled, the [facility] Centralized Referral Team will work with your insurance carrier to obtain pre-certification or prior authorization.  Depending on your insurance carrier, approval may take 3-10 days.  It is highly recommended patients assure they have received an authorization before having a test performed.  If test is done without insurance authorization, patient may be responsible for the entire amount billed.      Precertification and Prior Authorizations:  If your physician has recommended that you have a procedure or additional testing performed the [facility]  Centralized Referral Team will contact your insurance carrier to obtain pre-certification or prior authorization.    You are strongly encouraged to contact your insurance carrier to verify that your procedure/test has been approved and is a COVERED benefit.  Although the [facility] Centralized Referral Team does its due diligence, the insurance carrier gives the disclaimer that \"Although the procedure is authorized, this does not guarantee payment.\"    Ultimately the patient is responsible for payment.   Thank you for your understanding in this matter.  Paperwork Completion:  If you require FMLA or disability paperwork for your recovery, please make sure to either drop it off or have it faxed to our office at [phone]. Be sure the form has your name and date of birth on it.  The form will be faxed to our Forms Department and they will complete it for you.  There is a 25$ fee for all forms that need to be filled out.  Please be aware there is a 10-14 day turnaround time.  You will need to sign a release of information (TORY) form if your paperwork does not come with one.  You may call the Forms Department with any questions at [phone].  Their fax number is [phone].

## 2024-10-16 NOTE — PROGRESS NOTES
Last procedure: 09.30.24-LESI [id number]    Percentage of relief obtained: None    Duration of relief: NA    Current Pain Score: 7/10    Narcotic Contract Exp: NA    Increases LLE pain since injection causing      Has patient been flagged as fall risk:   Yes    TOP FALL PREVENTION TIPS    INSIDE YOUR HOME    KITCHEN:  Use non skid mats only.    Clean up spills as soon as they happen.  Keep objects that you use often within easy reach.  BATHROOM:  Install grab bars on the bathroom walls beside the tub, shower and toilet.  Use a non skid rubber mat in the tub/shower.  If you are unsteady on your feet you may want to use a shower chair/bench and a hand held shower head while bathing/showering.  BEDROOM:  Place light switches within reach of your bed and a night light between the bedroom and bathroom.  Get up slowly from lying down or sitting if you get dizzy.  Keep a working flashlight near your bed.  STAIRS:  Keep stairwells well lit with light switches at top and bottom.  Install sturdy handrails on both sides.  Make sure carpeting is secure.  FLOORS:  Remove all loose wires, cords and throw rugs.  Keep floors clear of clutter.  Make sure carpets and area rugs have skid proof backing.  Do not use slippery wax on bare floors.  Keep furniture in its accustomed place.   If you have pets, be careful that you don’t trip over them.    OUTSIDE SAFETY TIPS  Always wear good shoes with proper support and traction.  Always use hand rails on stairs and escalators.  Cover porch steps with gritty weather proof paint.  Pay attention to curbs and other changes in surfaces when out in the community.  Take care when walking on gravel or grassy surfaces.  Avoid walking on snowy or icy surfaces.  Use a cane or walker (indoors and out) if you are unsteady on your feet.

## 2025-01-09 ENCOUNTER — APPOINTMENT (OUTPATIENT)
Dept: CT IMAGING | Facility: HOSPITAL | Age: OVER 89
End: 2025-01-09
Attending: EMERGENCY MEDICINE
Payer: MEDICARE

## 2025-01-09 ENCOUNTER — HOSPITAL ENCOUNTER (EMERGENCY)
Facility: HOSPITAL | Age: OVER 89
Discharge: HOME | End: 2025-01-09
Attending: EMERGENCY MEDICINE
Payer: MEDICARE

## 2025-01-09 VITALS
DIASTOLIC BLOOD PRESSURE: 73 MMHG | RESPIRATION RATE: 18 BRPM | HEIGHT: 62 IN | SYSTOLIC BLOOD PRESSURE: 159 MMHG | HEART RATE: 95 BPM | BODY MASS INDEX: 22.08 KG/M2 | WEIGHT: 120 LBS | OXYGEN SATURATION: 100 % | TEMPERATURE: 98 F

## 2025-01-09 DIAGNOSIS — A08.4 VIRAL ENTERITIS: Primary | ICD-10-CM

## 2025-01-09 LAB
ALBUMIN SERPL-MCNC: 4.6 G/DL (ref 3.2–4.8)
ALBUMIN/GLOB SERPL: 1.9 {RATIO} (ref 1–2)
ALP LIVER SERPL-CCNC: 44 U/L
ALT SERPL-CCNC: 24 U/L
ANION GAP SERPL CALC-SCNC: 12 MMOL/L (ref 0–18)
AST SERPL-CCNC: 27 U/L (ref ?–34)
BASOPHILS # BLD AUTO: 0.02 X10(3) UL (ref 0–0.2)
BASOPHILS NFR BLD AUTO: 0.2 %
BILIRUB SERPL-MCNC: 0.9 MG/DL (ref 0.2–0.9)
BILIRUB UR QL: NEGATIVE
BUN BLD-MCNC: 18 MG/DL (ref 9–23)
BUN/CREAT SERPL: 27.3 (ref 10–20)
CALCIUM BLD-MCNC: 9.7 MG/DL (ref 8.7–10.4)
CHLORIDE SERPL-SCNC: 104 MMOL/L (ref 98–112)
CLARITY UR: CLEAR
CO2 SERPL-SCNC: 23 MMOL/L (ref 21–32)
COLOR UR: COLORLESS
CREAT BLD-MCNC: 0.66 MG/DL
DEPRECATED RDW RBC AUTO: 44.1 FL (ref 35.1–46.3)
EGFRCR SERPLBLD CKD-EPI 2021: 83 ML/MIN/1.73M2 (ref 60–?)
EOSINOPHIL # BLD AUTO: 0.09 X10(3) UL (ref 0–0.7)
EOSINOPHIL NFR BLD AUTO: 1 %
ERYTHROCYTE [DISTWIDTH] IN BLOOD BY AUTOMATED COUNT: 13.1 % (ref 11–15)
FLUAV + FLUBV RNA SPEC NAA+PROBE: NEGATIVE
FLUAV + FLUBV RNA SPEC NAA+PROBE: NEGATIVE
GLOBULIN PLAS-MCNC: 2.4 G/DL (ref 2–3.5)
GLUCOSE BLD-MCNC: 132 MG/DL (ref 70–99)
GLUCOSE UR-MCNC: NORMAL MG/DL
HCT VFR BLD AUTO: 38.3 %
HGB BLD-MCNC: 12.9 G/DL
HGB UR QL STRIP.AUTO: NEGATIVE
IMM GRANULOCYTES # BLD AUTO: 0.03 X10(3) UL (ref 0–1)
IMM GRANULOCYTES NFR BLD: 0.3 %
KETONES UR-MCNC: NEGATIVE MG/DL
LEUKOCYTE ESTERASE UR QL STRIP.AUTO: NEGATIVE
LIPASE SERPL-CCNC: 26 U/L (ref 12–53)
LYMPHOCYTES # BLD AUTO: 1 X10(3) UL (ref 1–4)
LYMPHOCYTES NFR BLD AUTO: 11.3 %
MCH RBC QN AUTO: 30.8 PG (ref 26–34)
MCHC RBC AUTO-ENTMCNC: 33.7 G/DL (ref 31–37)
MCV RBC AUTO: 91.4 FL
MONOCYTES # BLD AUTO: 0.51 X10(3) UL (ref 0.1–1)
MONOCYTES NFR BLD AUTO: 5.8 %
NEUTROPHILS # BLD AUTO: 7.19 X10 (3) UL (ref 1.5–7.7)
NEUTROPHILS # BLD AUTO: 7.19 X10(3) UL (ref 1.5–7.7)
NEUTROPHILS NFR BLD AUTO: 81.4 %
NITRITE UR QL STRIP.AUTO: NEGATIVE
OSMOLALITY SERPL CALC.SUM OF ELEC: 292 MOSM/KG (ref 275–295)
PH UR: 7 [PH] (ref 5–8)
PLATELET # BLD AUTO: 219 10(3)UL (ref 150–450)
POTASSIUM SERPL-SCNC: 4.3 MMOL/L (ref 3.5–5.1)
PROT SERPL-MCNC: 7 G/DL (ref 5.7–8.2)
PROT UR-MCNC: NEGATIVE MG/DL
RBC # BLD AUTO: 4.19 X10(6)UL
RSV RNA SPEC NAA+PROBE: NEGATIVE
SARS-COV-2 RNA RESP QL NAA+PROBE: NOT DETECTED
SODIUM SERPL-SCNC: 139 MMOL/L (ref 136–145)
SP GR UR STRIP: 1.02 (ref 1–1.03)
UROBILINOGEN UR STRIP-ACNC: NORMAL
WBC # BLD AUTO: 8.8 X10(3) UL (ref 4–11)

## 2025-01-09 PROCEDURE — 74177 CT ABD & PELVIS W/CONTRAST: CPT | Performed by: EMERGENCY MEDICINE

## 2025-01-09 PROCEDURE — 96361 HYDRATE IV INFUSION ADD-ON: CPT

## 2025-01-09 PROCEDURE — 99285 EMERGENCY DEPT VISIT HI MDM: CPT

## 2025-01-09 PROCEDURE — 81003 URINALYSIS AUTO W/O SCOPE: CPT | Performed by: EMERGENCY MEDICINE

## 2025-01-09 PROCEDURE — 96376 TX/PRO/DX INJ SAME DRUG ADON: CPT

## 2025-01-09 PROCEDURE — 0241U SARS-COV-2/FLU A AND B/RSV BY PCR (GENEXPERT): CPT | Performed by: EMERGENCY MEDICINE

## 2025-01-09 PROCEDURE — 85025 COMPLETE CBC W/AUTO DIFF WBC: CPT | Performed by: EMERGENCY MEDICINE

## 2025-01-09 PROCEDURE — 83690 ASSAY OF LIPASE: CPT | Performed by: EMERGENCY MEDICINE

## 2025-01-09 PROCEDURE — 96374 THER/PROPH/DIAG INJ IV PUSH: CPT

## 2025-01-09 PROCEDURE — 80053 COMPREHEN METABOLIC PANEL: CPT | Performed by: EMERGENCY MEDICINE

## 2025-01-09 RX ORDER — DICYCLOMINE HCL 20 MG
20 TABLET ORAL 4 TIMES DAILY PRN
Qty: 30 TABLET | Refills: 0 | Status: SHIPPED | OUTPATIENT
Start: 2025-01-09 | End: 2025-02-08

## 2025-01-09 RX ORDER — DICYCLOMINE HYDROCHLORIDE 10 MG/1
10 CAPSULE ORAL ONCE
Status: COMPLETED | OUTPATIENT
Start: 2025-01-09 | End: 2025-01-09

## 2025-01-09 RX ORDER — ONDANSETRON 4 MG/1
4 TABLET, ORALLY DISINTEGRATING ORAL EVERY 8 HOURS PRN
Qty: 10 TABLET | Refills: 0 | Status: SHIPPED | OUTPATIENT
Start: 2025-01-09 | End: 2025-01-16

## 2025-01-09 RX ORDER — ACETAMINOPHEN 500 MG
1000 TABLET ORAL ONCE
Status: COMPLETED | OUTPATIENT
Start: 2025-01-09 | End: 2025-01-09

## 2025-01-09 RX ORDER — ONDANSETRON 2 MG/ML
4 INJECTION INTRAMUSCULAR; INTRAVENOUS ONCE
Status: COMPLETED | OUTPATIENT
Start: 2025-01-09 | End: 2025-01-09

## 2025-01-09 NOTE — ED PROVIDER NOTES
Patient Seen in: [facility] Emergency Department      History     Chief Complaint   Patient presents with    Vomiting     Stated Complaint: vomiting    Subjective:   HPI      Patient presents the emergency department with vomiting.  She states that she has abdominal pain and vomiting which began at midnight.  She is concerned because she easily gets dehydrated.  She has had a history of pancreatitis with pseudocyst surgery in the past.  She denies aggravating or alleviating factors.  There is no blood in her stool.  There is no fever.    Objective:     Past Medical History:    Blood disorder    Cataract    Disorder of thyroid    Factor V Leiden mutation (HCC)    High cholesterol    Osteopenia    OTHER DISEASES    pancreatitis X 2    Problems with swallowing    Recurrent urinary tract infection              Past Surgical History:   Procedure Laterality Date    Cataract      Cholecystectomy      because of pancreatic pseudocyst which was also removed.    Colonoscopy       - had upper and lower endoscopy; ; Dr Lagunas because of anemia - normal.    Excisional biospy right  1973    Hysterectomy      ORA/BSO in     Ir vertebroplasty            Other surgical history  11    cysto-dr. gerard                Social History     Socioeconomic History    Marital status:    Tobacco Use    Smoking status: Never    Smokeless tobacco: Never   Substance and Sexual Activity    Alcohol use: No     Alcohol/week: 0.0 standard drinks of alcohol    Drug use: No   Other Topics Concern    Caffeine Concern Yes     Comment: 1 cup of coffee daily    Exercise Yes   Social History Narrative    The patient does not use an assistive device..      The patient does live in a home with stairs.     Social Drivers of Health     Food Insecurity: No Food Insecurity (2024)    Food Insecurity     Food Insecurity: Never true   Transportation Needs: No Transportation Needs (2024)    Transportation Needs     Lack  of Transportation: No   Housing Stability: Low Risk  (1/17/2024)    Housing Stability     Housing Instability: No                  Physical Exam     ED Triage Vitals [01/09/25 0850]   BP (!) 163/77   Pulse 90   Resp 15   Temp 98.4 °F (36.9 °C)   Temp src Temporal   SpO2 100 %   O2 Device None (Room air)       Current Vitals:   Vital Signs  BP: 156/64  Pulse: 96  Resp: 17  Temp: 98.4 °F (36.9 °C)  Temp src: Temporal  MAP (mmHg): 92    Oxygen Therapy  SpO2: 93 %  O2 Device: None (Room air)        Physical Exam  Vitals and nursing note reviewed.   Constitutional:       General: She is not in acute distress.     Appearance: She is well-developed.   HENT:      Head: Normocephalic.      Nose: Nose normal.      Mouth/Throat:      Mouth: Mucous membranes are moist.   Eyes:      Conjunctiva/sclera: Conjunctivae normal.   Cardiovascular:      Rate and Rhythm: Normal rate and regular rhythm.      Heart sounds: No murmur heard.  Pulmonary:      Effort: Pulmonary effort is normal. No respiratory distress.      Breath sounds: Normal breath sounds.   Abdominal:      General: There is no distension.      Palpations: Abdomen is soft.      Tenderness: There is generalized abdominal tenderness. There is no guarding or rebound.   Musculoskeletal:         General: No tenderness. Normal range of motion.      Cervical back: Normal range of motion and neck supple.   Skin:     General: Skin is warm and dry.      Findings: No rash.   Neurological:      Mental Status: She is alert and oriented to person, place, and time.             ED Course     Labs Reviewed   COMP METABOLIC PANEL (14) - Abnormal; Notable for the following components:       Result Value    Glucose 132 (*)     BUN/CREA Ratio 27.3 (*)     Alkaline Phosphatase 44 (*)     All other components within normal limits   URINALYSIS, ROUTINE - Abnormal; Notable for the following components:    Urine Color Colorless (*)     All other components within normal limits   LIPASE - Normal    SARS-COV-2/FLU A AND B/RSV BY PCR (GENEXPERT) - Normal    Narrative:     This test is intended for the qualitative detection and differentiation of SARS-CoV-2, influenza A, influenza B, and respiratory syncytial virus (RSV) viral RNA in nasopharyngeal or nares swabs from individuals suspected of respiratory viral infection consistent with COVID-19 by their healthcare provider. Signs and symptoms of respiratory viral infection due to SARS-CoV-2, influenza, and RSV can be similar.    Test performed using the Xpert Xpress SARS-CoV-2/FLU/RSV (real time RT-PCR)  assay on the [employer] instrument, [employer], [place] [address].   This test is being used under the Food and Drug Administration's Emergency Use Authorization.    The authorized Fact Sheet for Healthcare Providers for this assay is available upon request from the laboratory.   CBC WITH DIFFERENTIAL WITH PLATELET   RAINBOW DRAW BLUE                   MDM              Medical Decision Making  Differential diagnosis considered for pancreatitis, enteritis, bowel obstruction, colitis.    Problems Addressed:  Viral enteritis: acute illness or injury    Amount and/or Complexity of Data Reviewed  Labs: ordered. Decision-making details documented in ED Course.     Details: OnCBC chemistry and urine panel normal  Radiology: ordered and independent interpretation performed. Decision-making details documented in ED Course.     Details: CT the abdomen shows findings consistent with enteritis  Discussion of management or test interpretation with external provider(s): There is been a norovirus outbreak at her living facility which would be consistent with what symptoms she presents with today.  Patient was extremely concerned about going back to her independent living facility despite the fact that she no longer is vomiting and has no nausea.  She still continues to have some mild crampy abdominal discomfort.  She continues to say \"there is no one there to take care of me\".   With the help of the  in the emergency department, she was able to arrange for a caregiver to arrive in her home and meet her upon discharge from the emergency department and assist her for the next 48 hours.  She understands to return for worsening condition.    Risk  Prescription drug management.        Disposition and Plan     Clinical Impression:  1. Viral enteritis         Disposition:  Discharge  1/9/2025  1:44 pm    Follow-up:  Marce Smith MD    Schedule an appointment as soon as possible for a visit      We recommend that you schedule follow up care with a primary care provider within the next three months to obtain basic health screening including reassessment of your blood pressure.      Medications Prescribed:  Current Discharge Medication List              Supplementary Documentation:

## 2025-01-09 NOTE — ED QUICK NOTES
Pt ambulatory to washroom x 2. Pt states \"I can't go home. I have no one to take care of me.\" Pt getting anxious in room, tearful. States, \"I'm afraid I'm going to fall\". MD notified.   notified by Dr. Griffith.

## 2025-01-09 NOTE — CM/SW NOTE
Per ERMD, patient is requesting additional asisstance once discharged from the ER.    Spoke with patient and she stated that she lives at [facility] and that  has an outbreak of the norovirus and that they are on \"lock down\" d/t the outbreak.    Called Patricia mendoza at [facility] asking for patient's options for care in [place].    Patient does not qualify for rehab with her Medicare  Respite - private pay $430  Care giver OOP $35/hr    Spoke with patient and she used the care giver service before and would like a care giver 24/7 for at least 4 days.    Spoke with Patricia mendoza at  again and informed her of above.    Patricia will set up care giver and call EDCM to inform me how early CG can be at patient's [place] apartment.  Patricia stated that she should be able to arrange CG for today.  She will call EDCM with CG information.    MD and RN updated..

## 2025-01-09 NOTE — CM/SW NOTE
Spoke with Patricia mendoza at [facility] and she stated that the care giver service that [facility] use, has been set up and they will be at patient's Independent Living apartment around 400pm    [facility] arranged to transport patient back to [facility]..    ETA 430pm    PCS form completed by RN in Epic.    430pm    Per request from PP, SNF referral sent to them via [facility] with HHC documentation so PP can set up HHC when patient is ready for it.

## 2025-01-09 NOTE — ED INITIAL ASSESSMENT (HPI)
Pt arrived via EMS from [facility] c/o vomiting since midnight. Denies fever

## 2025-01-26 ENCOUNTER — HOSPITAL ENCOUNTER (EMERGENCY)
Facility: HOSPITAL | Age: OVER 89
Discharge: HOME | End: 2025-01-26
Attending: EMERGENCY MEDICINE
Payer: MEDICARE

## 2025-01-26 ENCOUNTER — APPOINTMENT (OUTPATIENT)
Dept: CT IMAGING | Facility: HOSPITAL | Age: OVER 89
End: 2025-01-26
Attending: EMERGENCY MEDICINE
Payer: MEDICARE

## 2025-01-26 VITALS
DIASTOLIC BLOOD PRESSURE: 61 MMHG | SYSTOLIC BLOOD PRESSURE: 156 MMHG | TEMPERATURE: 98 F | HEART RATE: 82 BPM | RESPIRATION RATE: 18 BRPM | OXYGEN SATURATION: 97 %

## 2025-01-26 DIAGNOSIS — R10.9 ABDOMINAL PAIN, ACUTE: Primary | ICD-10-CM

## 2025-01-26 LAB
ALBUMIN SERPL-MCNC: 4.4 G/DL (ref 3.2–4.8)
ALBUMIN/GLOB SERPL: 1.9 {RATIO} (ref 1–2)
ALP LIVER SERPL-CCNC: 41 U/L
ALT SERPL-CCNC: 25 U/L
ANION GAP SERPL CALC-SCNC: 10 MMOL/L (ref 0–18)
AST SERPL-CCNC: 20 U/L (ref ?–34)
BASOPHILS # BLD AUTO: 0.03 X10(3) UL (ref 0–0.2)
BASOPHILS NFR BLD AUTO: 0.5 %
BILIRUB SERPL-MCNC: 0.8 MG/DL (ref 0.2–0.9)
BILIRUB UR QL: NEGATIVE
BUN BLD-MCNC: 18 MG/DL (ref 9–23)
BUN/CREAT SERPL: 21.7 (ref 10–20)
CALCIUM BLD-MCNC: 10.1 MG/DL (ref 8.7–10.4)
CHLORIDE SERPL-SCNC: 102 MMOL/L (ref 98–112)
CLARITY UR: CLEAR
CO2 SERPL-SCNC: 25 MMOL/L (ref 21–32)
CREAT BLD-MCNC: 0.83 MG/DL
DEPRECATED RDW RBC AUTO: 46.5 FL (ref 35.1–46.3)
EGFRCR SERPLBLD CKD-EPI 2021: 67 ML/MIN/1.73M2 (ref 60–?)
EOSINOPHIL # BLD AUTO: 0.11 X10(3) UL (ref 0–0.7)
EOSINOPHIL NFR BLD AUTO: 1.7 %
ERYTHROCYTE [DISTWIDTH] IN BLOOD BY AUTOMATED COUNT: 13.2 % (ref 11–15)
GLOBULIN PLAS-MCNC: 2.3 G/DL (ref 2–3.5)
GLUCOSE BLD-MCNC: 151 MG/DL (ref 70–99)
GLUCOSE UR-MCNC: NORMAL MG/DL
HCT VFR BLD AUTO: 38.6 %
HGB BLD-MCNC: 12.2 G/DL
HGB UR QL STRIP.AUTO: NEGATIVE
IMM GRANULOCYTES # BLD AUTO: 0.01 X10(3) UL (ref 0–1)
IMM GRANULOCYTES NFR BLD: 0.2 %
KETONES UR-MCNC: NEGATIVE MG/DL
LEUKOCYTE ESTERASE UR QL STRIP.AUTO: NEGATIVE
LYMPHOCYTES # BLD AUTO: 1.76 X10(3) UL (ref 1–4)
LYMPHOCYTES NFR BLD AUTO: 27.4 %
MAGNESIUM SERPL-MCNC: 1.8 MG/DL (ref 1.6–2.6)
MCH RBC QN AUTO: 30.1 PG (ref 26–34)
MCHC RBC AUTO-ENTMCNC: 31.6 G/DL (ref 31–37)
MCV RBC AUTO: 95.3 FL
MONOCYTES # BLD AUTO: 0.52 X10(3) UL (ref 0.1–1)
MONOCYTES NFR BLD AUTO: 8.1 %
NEUTROPHILS # BLD AUTO: 3.99 X10 (3) UL (ref 1.5–7.7)
NEUTROPHILS # BLD AUTO: 3.99 X10(3) UL (ref 1.5–7.7)
NEUTROPHILS NFR BLD AUTO: 62.1 %
NITRITE UR QL STRIP.AUTO: NEGATIVE
OSMOLALITY SERPL CALC.SUM OF ELEC: 289 MOSM/KG (ref 275–295)
PH UR: 6.5 [PH] (ref 5–8)
PLATELET # BLD AUTO: 242 10(3)UL (ref 150–450)
POTASSIUM SERPL-SCNC: 4 MMOL/L (ref 3.5–5.1)
PROT SERPL-MCNC: 6.7 G/DL (ref 5.7–8.2)
PROT UR-MCNC: NEGATIVE MG/DL
RBC # BLD AUTO: 4.05 X10(6)UL
SODIUM SERPL-SCNC: 137 MMOL/L (ref 136–145)
SP GR UR STRIP: 1.03 (ref 1–1.03)
UROBILINOGEN UR STRIP-ACNC: NORMAL
WBC # BLD AUTO: 6.4 X10(3) UL (ref 4–11)

## 2025-01-26 PROCEDURE — S0028 INJECTION, FAMOTIDINE, 20 MG: HCPCS | Performed by: EMERGENCY MEDICINE

## 2025-01-26 PROCEDURE — 81003 URINALYSIS AUTO W/O SCOPE: CPT | Performed by: EMERGENCY MEDICINE

## 2025-01-26 PROCEDURE — 96375 TX/PRO/DX INJ NEW DRUG ADDON: CPT

## 2025-01-26 PROCEDURE — 74177 CT ABD & PELVIS W/CONTRAST: CPT | Performed by: EMERGENCY MEDICINE

## 2025-01-26 PROCEDURE — 96374 THER/PROPH/DIAG INJ IV PUSH: CPT

## 2025-01-26 PROCEDURE — 96372 THER/PROPH/DIAG INJ SC/IM: CPT

## 2025-01-26 PROCEDURE — 80053 COMPREHEN METABOLIC PANEL: CPT | Performed by: EMERGENCY MEDICINE

## 2025-01-26 PROCEDURE — 99285 EMERGENCY DEPT VISIT HI MDM: CPT

## 2025-01-26 PROCEDURE — 85025 COMPLETE CBC W/AUTO DIFF WBC: CPT | Performed by: EMERGENCY MEDICINE

## 2025-01-26 PROCEDURE — 83735 ASSAY OF MAGNESIUM: CPT | Performed by: EMERGENCY MEDICINE

## 2025-01-26 PROCEDURE — 96361 HYDRATE IV INFUSION ADD-ON: CPT

## 2025-01-26 RX ORDER — KETOROLAC TROMETHAMINE 15 MG/ML
15 INJECTION, SOLUTION INTRAMUSCULAR; INTRAVENOUS ONCE
Status: COMPLETED | OUTPATIENT
Start: 2025-01-26 | End: 2025-01-26

## 2025-01-26 RX ORDER — MAGNESIUM HYDROXIDE/ALUMINUM HYDROXICE/SIMETHICONE 120; 1200; 1200 MG/30ML; MG/30ML; MG/30ML
30 SUSPENSION ORAL ONCE
Status: COMPLETED | OUTPATIENT
Start: 2025-01-26 | End: 2025-01-26

## 2025-01-26 RX ORDER — SUCRALFATE ORAL 1 G/10ML
1 SUSPENSION ORAL ONCE
Status: COMPLETED | OUTPATIENT
Start: 2025-01-26 | End: 2025-01-26

## 2025-01-26 RX ORDER — ONDANSETRON 2 MG/ML
4 INJECTION INTRAMUSCULAR; INTRAVENOUS ONCE
Status: COMPLETED | OUTPATIENT
Start: 2025-01-26 | End: 2025-01-26

## 2025-01-26 RX ORDER — DICYCLOMINE HYDROCHLORIDE 10 MG/ML
20 INJECTION INTRAMUSCULAR ONCE
Status: COMPLETED | OUTPATIENT
Start: 2025-01-26 | End: 2025-01-26

## 2025-01-26 RX ORDER — FAMOTIDINE 10 MG/ML
20 INJECTION, SOLUTION INTRAVENOUS ONCE
Status: COMPLETED | OUTPATIENT
Start: 2025-01-26 | End: 2025-01-26

## 2025-01-26 RX ORDER — SUCRALFATE 1 G/1
1 TABLET ORAL
Qty: 120 TABLET | Refills: 0 | Status: SHIPPED | OUTPATIENT
Start: 2025-01-26 | End: 2025-02-25

## 2025-01-26 RX ORDER — LIDOCAINE HYDROCHLORIDE 20 MG/ML
10 SOLUTION OROPHARYNGEAL ONCE
Status: COMPLETED | OUTPATIENT
Start: 2025-01-26 | End: 2025-01-26

## 2025-01-26 RX ORDER — FAMOTIDINE 20 MG/1
20 TABLET, FILM COATED ORAL 2 TIMES DAILY PRN
Qty: 30 TABLET | Refills: 0 | Status: SHIPPED | OUTPATIENT
Start: 2025-01-26 | End: 2025-02-25

## 2025-01-26 NOTE — ED PROVIDER NOTES
Patient Seen in: [facility] Emergency Department      History     Chief Complaint   Patient presents with    Abdomen/Flank Pain     Stated Complaint: abd pain    Subjective:   HPI      89-year-old female presents for evaluation for abdominal pain.  Patient reports that she was here recently for norovirus and that her bowels have not gotten back to normal yet.  She feels like she might be constipated and is having lower bilateral abdominal pain.  She denies any nausea, vomiting, dysuria, hematuria, diarrhea, fevers, chills.    Objective:     Past Medical History:    Blood disorder    Cataract    Disorder of thyroid    Factor V Leiden mutation (HCC)    High cholesterol    Osteopenia    OTHER DISEASES    pancreatitis X 2    Problems with swallowing    Recurrent urinary tract infection              Past Surgical History:   Procedure Laterality Date    Cataract      Cholecystectomy      because of pancreatic pseudocyst which was also removed.    Colonoscopy       - had upper and lower endoscopy; ; Dr Lagunas because of anemia - normal.    Excisional biospy right  1973    Hysterectomy      ORA/BSO in     Ir vertebroplasty            Other surgical history  11    cysto-dr. gerard                Social History     Socioeconomic History    Marital status:    Tobacco Use    Smoking status: Never    Smokeless tobacco: Never   Substance and Sexual Activity    Alcohol use: No     Alcohol/week: 0.0 standard drinks of alcohol    Drug use: No   Other Topics Concern    Caffeine Concern Yes     Comment: 1 cup of coffee daily    Exercise Yes   Social History Narrative    The patient does not use an assistive device..      The patient does live in a home with stairs.     Social Drivers of Health     Food Insecurity: No Food Insecurity (2024)    Food Insecurity     Food Insecurity: Never true   Transportation Needs: No Transportation Needs (2024)    Transportation Needs     Lack of  Transportation: No   Housing Stability: Low Risk  (1/17/2024)    Housing Stability     Housing Instability: No                  Physical Exam     ED Triage Vitals   BP 01/26/25 1717 (!) 170/71   Pulse 01/26/25 1717 74   Resp 01/26/25 1717 18   Temp 01/26/25 1719 98 °F (36.7 °C)   Temp src 01/26/25 1719 Temporal   SpO2 01/26/25 1717 99 %   O2 Device 01/26/25 1717 None (Room air)       Current Vitals:   Vital Signs  BP: 156/61  Pulse: 82  Resp: 18  Temp: 98 °F (36.7 °C)  Temp src: Temporal  MAP (mmHg): 90    Oxygen Therapy  SpO2: 97 %  O2 Device: None (Room air)        Physical Exam  Vitals and nursing note reviewed.   Constitutional:       Appearance: Normal appearance.   HENT:      Head: Normocephalic and atraumatic.   Cardiovascular:      Rate and Rhythm: Normal rate and regular rhythm.      Pulses: Normal pulses.   Pulmonary:      Effort: Pulmonary effort is normal.      Breath sounds: Normal breath sounds.   Abdominal:      Tenderness: There is abdominal tenderness in the right lower quadrant, periumbilical area, suprapubic area and left lower quadrant.   Musculoskeletal:         General: Normal range of motion.      Cervical back: Normal range of motion.   Skin:     General: Skin is warm and dry.   Neurological:      General: No focal deficit present.      Mental Status: She is alert.             ED Course     Labs Reviewed   CBC WITH DIFFERENTIAL WITH PLATELET - Abnormal; Notable for the following components:       Result Value    RDW-SD 46.5 (*)     All other components within normal limits   COMP METABOLIC PANEL (14) - Abnormal; Notable for the following components:    Glucose 151 (*)     BUN/CREA Ratio 21.7 (*)     Alkaline Phosphatase 41 (*)     All other components within normal limits   MAGNESIUM - Normal   URINALYSIS WITH CULTURE REFLEX       ED Course as of 01/26/25 2032  ------------------------------------------------------------  Time: 01/26 1942  Value: CT ABDOMEN+PELVIS(CONTRAST  ONLY)(CPT=74177)  Comment: Per my independent interpretation, patient's CT Abdomen and Pelvis demonstrates enteritis.                MDM              Medical Decision Making  Differential diagnosis includes but is not limited to constipation, urinary tract infection, gastroenteritis, appendicitis, diverticulitis, etc.    CBC and CMP are unremarkable.  Patient given IV fluids, ketorolac and dicyclomine.  CT imaging c/w enteritis.  There is nothing on exam or history to suggest developing small bowel obstruction.  Patient's urinalysis was unremarkable.  Patient was still having some epigastric discomfort, she may have some underlying gastritis as well, she was given Mylanta, viscous lidocaine, Pepcid and Zofran.  She will be discharged home with prescriptions for sucralfate and Pepcid. Return precautions given.     Medical Record Review: I personally reviewed available prior medical records for any recent pertinent discharge summaries, testing, and procedures, and reviewed those reports.    Complicating factors: The patient  has a past medical history of Blood disorder, Cataract, Disorder of thyroid, Factor V Leiden mutation (HCC), High cholesterol, Osteopenia, OTHER DISEASES, Problems with swallowing, and Recurrent urinary tract infection. and  has a past surgical history that includes hysterectomy; cholecystectomy; ; colonoscopy; ir vertebroplasty; other surgical history (11); excisional biospy right (); and cataract. that contribute to the medical complexity of this ED evaluation.     Clinical impression as well as any lab results and radiology findings were discussed with the patient and/or caregiver. I personally reviewed all laboratory results and radiology images myself. Patient is advised to follow up with PCP for reevaluation. I provided discharge instructions and return precautions. Patient and/or caregiver voices understanding and agreement with the treatment plan. All questions were addressed  and answered.         Problems Addressed:  Abdominal pain, acute: acute illness or injury with systemic symptoms    Amount and/or Complexity of Data Reviewed  External Data Reviewed: notes.     Details: ED visit on 1/9 reviewed, patient diagnosed with gastroenteritis during a norovirus outbreak at her living facility.    Labs: ordered. Decision-making details documented in ED Course.  Radiology: ordered and independent interpretation performed. Decision-making details documented in ED Course.    Risk  Prescription drug management.        Disposition and Plan     Clinical Impression:  1. Abdominal pain, acute         Disposition:  Discharge  1/26/2025  8:29 pm    Follow-up:  Marce Smith MD    Schedule an appointment as soon as possible for a visit  For follow up and re-evaluation    We recommend that you schedule follow up care with a primary care provider within the next three months to obtain basic health screening including reassessment of your blood pressure.      Medications Prescribed:  Current Discharge Medication List        START taking these medications    Details   !! famotidine (PEPCID) 20 MG Oral Tab Take 1 tablet (20 mg total) by mouth 2 (two) times daily as needed for Heartburn.  Qty: 30 tablet, Refills: 0      sucralfate 1 g Oral Tab Take 1 tablet (1 g total) by mouth 4 (four) times daily before meals and nightly.  Qty: 120 tablet, Refills: 0       !! - Potential duplicate medications found. Please discuss with provider.              Supplementary Documentation:

## 2025-01-28 ENCOUNTER — HOSPITAL ENCOUNTER (EMERGENCY)
Facility: HOSPITAL | Age: OVER 89
Discharge: HOME | End: 2025-01-28
Attending: EMERGENCY MEDICINE
Payer: MEDICARE

## 2025-01-28 ENCOUNTER — APPOINTMENT (OUTPATIENT)
Dept: GENERAL RADIOLOGY | Facility: HOSPITAL | Age: OVER 89
End: 2025-01-28
Attending: EMERGENCY MEDICINE
Payer: MEDICARE

## 2025-01-28 VITALS
HEART RATE: 65 BPM | OXYGEN SATURATION: 97 % | RESPIRATION RATE: 16 BRPM | SYSTOLIC BLOOD PRESSURE: 153 MMHG | BODY MASS INDEX: 21.26 KG/M2 | HEIGHT: 63 IN | DIASTOLIC BLOOD PRESSURE: 63 MMHG | WEIGHT: 120 LBS | TEMPERATURE: 99 F

## 2025-01-28 DIAGNOSIS — R10.30 LOWER ABDOMINAL PAIN: Primary | ICD-10-CM

## 2025-01-28 DIAGNOSIS — K59.00 CONSTIPATION, UNSPECIFIED CONSTIPATION TYPE: ICD-10-CM

## 2025-01-28 LAB
ALBUMIN SERPL-MCNC: 3.9 G/DL (ref 3.2–4.8)
ALBUMIN/GLOB SERPL: 1.9 {RATIO} (ref 1–2)
ALP LIVER SERPL-CCNC: 54 U/L
ALT SERPL-CCNC: 84 U/L
ANION GAP SERPL CALC-SCNC: 9 MMOL/L (ref 0–18)
AST SERPL-CCNC: 65 U/L (ref ?–34)
BASOPHILS # BLD AUTO: 0.02 X10(3) UL (ref 0–0.2)
BASOPHILS NFR BLD AUTO: 0.3 %
BILIRUB SERPL-MCNC: 1 MG/DL (ref 0.2–0.9)
BUN BLD-MCNC: 17 MG/DL (ref 9–23)
BUN/CREAT SERPL: 25.8 (ref 10–20)
CALCIUM BLD-MCNC: 8.9 MG/DL (ref 8.7–10.4)
CHLORIDE SERPL-SCNC: 101 MMOL/L (ref 98–112)
CO2 SERPL-SCNC: 24 MMOL/L (ref 21–32)
CREAT BLD-MCNC: 0.66 MG/DL
DEPRECATED RDW RBC AUTO: 45.1 FL (ref 35.1–46.3)
EGFRCR SERPLBLD CKD-EPI 2021: 83 ML/MIN/1.73M2 (ref 60–?)
EOSINOPHIL # BLD AUTO: 0.06 X10(3) UL (ref 0–0.7)
EOSINOPHIL NFR BLD AUTO: 0.8 %
ERYTHROCYTE [DISTWIDTH] IN BLOOD BY AUTOMATED COUNT: 13.2 % (ref 11–15)
GLOBULIN PLAS-MCNC: 2.1 G/DL (ref 2–3.5)
GLUCOSE BLD-MCNC: 110 MG/DL (ref 70–99)
HCT VFR BLD AUTO: 32.8 %
HGB BLD-MCNC: 11.4 G/DL
IMM GRANULOCYTES # BLD AUTO: 0.02 X10(3) UL (ref 0–1)
IMM GRANULOCYTES NFR BLD: 0.3 %
LIPASE SERPL-CCNC: 43 U/L (ref 12–53)
LYMPHOCYTES # BLD AUTO: 1.3 X10(3) UL (ref 1–4)
LYMPHOCYTES NFR BLD AUTO: 17.3 %
MCH RBC QN AUTO: 32.3 PG (ref 26–34)
MCHC RBC AUTO-ENTMCNC: 34.8 G/DL (ref 31–37)
MCV RBC AUTO: 92.9 FL
MONOCYTES # BLD AUTO: 0.59 X10(3) UL (ref 0.1–1)
MONOCYTES NFR BLD AUTO: 7.8 %
NEUTROPHILS # BLD AUTO: 5.54 X10 (3) UL (ref 1.5–7.7)
NEUTROPHILS # BLD AUTO: 5.54 X10(3) UL (ref 1.5–7.7)
NEUTROPHILS NFR BLD AUTO: 73.5 %
OSMOLALITY SERPL CALC.SUM OF ELEC: 280 MOSM/KG (ref 275–295)
PLATELET # BLD AUTO: 168 10(3)UL (ref 150–450)
POTASSIUM SERPL-SCNC: 3.9 MMOL/L (ref 3.5–5.1)
PROT SERPL-MCNC: 6 G/DL (ref 5.7–8.2)
RBC # BLD AUTO: 3.53 X10(6)UL
SODIUM SERPL-SCNC: 134 MMOL/L (ref 136–145)
WBC # BLD AUTO: 7.5 X10(3) UL (ref 4–11)

## 2025-01-28 PROCEDURE — 83690 ASSAY OF LIPASE: CPT | Performed by: EMERGENCY MEDICINE

## 2025-01-28 PROCEDURE — 93010 ELECTROCARDIOGRAM REPORT: CPT

## 2025-01-28 PROCEDURE — 99284 EMERGENCY DEPT VISIT MOD MDM: CPT

## 2025-01-28 PROCEDURE — 85025 COMPLETE CBC W/AUTO DIFF WBC: CPT | Performed by: EMERGENCY MEDICINE

## 2025-01-28 PROCEDURE — 93005 ELECTROCARDIOGRAM TRACING: CPT

## 2025-01-28 PROCEDURE — 80053 COMPREHEN METABOLIC PANEL: CPT | Performed by: EMERGENCY MEDICINE

## 2025-01-28 PROCEDURE — 96374 THER/PROPH/DIAG INJ IV PUSH: CPT

## 2025-01-28 PROCEDURE — 74019 RADEX ABDOMEN 2 VIEWS: CPT | Performed by: EMERGENCY MEDICINE

## 2025-01-28 RX ORDER — KETOROLAC TROMETHAMINE 15 MG/ML
15 INJECTION, SOLUTION INTRAMUSCULAR; INTRAVENOUS ONCE
Status: COMPLETED | OUTPATIENT
Start: 2025-01-28 | End: 2025-01-28

## 2025-01-28 NOTE — ED INITIAL ASSESSMENT (HPI)
Patient arrives to the ED via EMS. States she was just here yesterday for the same reason, has c/o abd pain and acid reflux. Was diagnosed with norovirus on 1/9.

## 2025-01-29 LAB
ATRIAL RATE: 80 BPM
P AXIS: 51 DEGREES
P-R INTERVAL: 142 MS
Q-T INTERVAL: 370 MS
QRS DURATION: 84 MS
QTC CALCULATION (BEZET): 426 MS
R AXIS: -17 DEGREES
T AXIS: 40 DEGREES
VENTRICULAR RATE: 80 BPM

## 2025-01-29 NOTE — DISCHARGE INSTRUCTIONS
Take MiraLAX as discussed.  Continue other medications as previously prescribed.  Follow-up with your primary physician.  Return to the emergency department if increased abdominal pain, repeated vomiting, or other new symptoms develop.

## 2025-01-29 NOTE — ED QUICK NOTES
Patient provided discharge instructions. Verbalized understanding for plan of care at home and follow up. All questions/concerns addressed prior to discharge. [facility] arranged for discharge.

## 2025-01-29 NOTE — ED QUICK NOTES
Patient presents to ED 62 from triage with c/o lower abdominal pain. Patient seen in ED yesterday for same issue, diagnosed with norovirus on 1/9 and then yesterday with acid reflux. Patient has been taking her medication and states now that she is back in ED is feeling slightly better than before she got here. Patient is A&O x 4. No distress noted. Respirations regular and unlabored. Call light at reach.

## 2025-01-29 NOTE — ED PROVIDER NOTES
Patient Seen in: [facility] Emergency Department      History     Chief Complaint   Patient presents with    Abdomen/Flank Pain     Stated Complaint:     Subjective:   HPI      89-year-old female with history of hypercholesterolemia, thyroid disorder, and factor V Leiden mutation presents with complaints of abdominal pain.  The patient reports a recent history of norovirus at which time she had vomiting and abdominal discomfort.  She presented to the emergency department last night with complaints of increased abdominal pain.  She describes diffuse abdominal pain worsened to the epigastric area.  She had laboratory studies and a CT of her abdomen/pelvis.  She was ultimately discharged home with Pepcid and sucralfate.  She returns today with complaints of continued pain.  She states the pain has now moved to her lower abdomen.  She states she has not had, \"a good bowel movement,\" since her norovirus earlier this month.  She denies any vomiting.  No known fevers.  She denies dysuria.    Objective:     No pertinent past medical history.            No pertinent past surgical history.              No pertinent social history.                Physical Exam     ED Triage Vitals [01/28/25 1606]   BP (!) 162/67   Pulse 82   Resp 18   Temp 98.6 °F (37 °C)   Temp src Temporal   SpO2 97 %   O2 Device None (Room air)       Current Vitals:   Vital Signs  BP: (!) 162/67  Pulse: 82  Resp: 18  Temp: 98.6 °F (37 °C)  Temp src: Temporal    Oxygen Therapy  SpO2: 97 %  O2 Device: None (Room air)        Physical Exam    General Appearance: alert, no distress  Eyes: pupils equal and round no pallor or injection  ENT, Mouth: mucous membranes moist  Respiratory: there are no retractions, lungs are clear to auscultation  Cardiovascular: regular rate and rhythm  Gastrointestinal:  abdomen is soft with diffuse tenderness to palpation, no masses, bowel sounds normal  Neurological: Speech normal.  Moving extremities x 4.  Skin: warm and  dry, no rashes.  Musculoskeletal: neck is supple non tender        Extremities are symmetrical, full range of motion  Psychiatric: patient is oriented X 3, there is no agitation    ED Course     Labs Reviewed   COMP METABOLIC PANEL (14) - Abnormal; Notable for the following components:       Result Value    Glucose 110 (*)     Sodium 134 (*)     BUN/CREA Ratio 25.8 (*)     ALT 84 (*)     AST 65 (*)     Alkaline Phosphatase 54 (*)     Bilirubin, Total 1.0 (*)     All other components within normal limits   CBC WITH DIFFERENTIAL WITH PLATELET - Abnormal; Notable for the following components:    RBC 3.53 (*)     HGB 11.4 (*)     HCT 32.8 (*)     All other components within normal limits   LIPASE - Normal   URINALYSIS WITH CULTURE REFLEX     EKG    Rate, intervals and axes as noted on EKG Report.  Rate: 80  Rhythm: Sinus Rhythm  Axis: Normal  Reading: Nonspecific EKG                     MDM      Lab results noted.  No significant abnormalities or change from yesterday.  X-ray is suggestive of constipation.  The patient states she has had a little in the way of bowel movements since her norovirus.  She was given MiraLAX but states that she was hesitant to take it and has not tried it.  Given x-ray findings today I suspect this may be the etiology of her lower abdominal discomfort.  Will recommend that she try the MiraLAX in addition to other medications as recently prescribed.  She is advised to follow-up with her primary physician for reevaluation.  She is advised to return if increased pain, repeated vomiting, or other new symptoms develop.        Medical Decision Making      Disposition and Plan     Clinical Impression:  1. Lower abdominal pain    2. Constipation, unspecified constipation type         Disposition:  Discharge  1/28/2025  9:09 pm    Follow-up:  Marce Smith MD    Follow up      We recommend that you schedule follow up care with a primary care provider within the next three months to obtain basic  health screening including reassessment of your blood pressure.      Medications Prescribed:  Current Discharge Medication List              Supplementary Documentation:

## 2025-04-08 ENCOUNTER — OFFICE VISIT (OUTPATIENT)
Dept: PAIN CLINIC | Facility: HOSPITAL | Age: OVER 89
End: 2025-04-08
Attending: NURSE PRACTITIONER
Payer: MEDICARE

## 2025-04-08 VITALS
DIASTOLIC BLOOD PRESSURE: 83 MMHG | SYSTOLIC BLOOD PRESSURE: 156 MMHG | BODY MASS INDEX: 21 KG/M2 | OXYGEN SATURATION: 97 % | WEIGHT: 120 LBS | HEART RATE: 81 BPM

## 2025-04-08 DIAGNOSIS — M43.06 LUMBAR SPONDYLOLYSIS: ICD-10-CM

## 2025-04-08 DIAGNOSIS — M54.16 LUMBAR RADICULOPATHY: Primary | ICD-10-CM

## 2025-04-08 PROCEDURE — 99214 OFFICE O/P EST MOD 30 MIN: CPT | Performed by: NURSE PRACTITIONER

## 2025-04-08 RX ORDER — GABAPENTIN 300 MG/1
300 CAPSULE ORAL 2 TIMES DAILY
Qty: 60 CAPSULE | Refills: 0 | Status: SHIPPED | OUTPATIENT
Start: 2025-04-08 | End: 2025-05-08

## 2025-04-08 NOTE — PROGRESS NOTES
Patient presents in office today with reported R sided sciatica .      Current pain level reported = 8/10     Last reported dose of 2 Advil and Tylenol this morning         Narcotic Contract renewal NA     Increased pain.  Would like to discuss an injection.     Has patient been flagged as fall risk:   Yes     TOP FALL PREVENTION TIPS     INSIDE YOUR HOME     KITCHEN:  Use non skid mats only.    Clean up spills as soon as they happen.  Keep objects that you use often within easy reach.  BATHROOM:  Install grab bars on the bathroom walls beside the tub, shower and toilet.  Use a non skid rubber mat in the tub/shower.  If you are unsteady on your feet you may want to use a shower chair/bench and a hand held shower head while bathing/showering.  BEDROOM:  Place light switches within reach of your bed and a night light between the bedroom and bathroom.  Get up slowly from lying down or sitting if you get dizzy.  Keep a working flashlight near your bed.  STAIRS:  Keep stairwells well lit with light switches at top and bottom.  Install sturdy handrails on both sides.  Make sure carpeting is secure.  FLOORS:  Remove all loose wires, cords and throw rugs.  Keep floors clear of clutter.  Make sure carpets and area rugs have skid proof backing.  Do not use slippery wax on bare floors.  Keep furniture in its accustomed place.   If you have pets, be careful that you don’t trip over them.     OUTSIDE SAFETY TIPS  Always wear good shoes with proper support and traction.  Always use hand rails on stairs and escalators.  Cover porch steps with gritty weather proof paint.  Pay attention to curbs and other changes in surfaces when out in the community.  Take care when walking on gravel or grassy surfaces.  Avoid walking on snowy or icy surfaces.  Use a cane or walker (indoors and out) if you are unsteady on your feet.

## 2025-04-08 NOTE — PATIENT INSTRUCTIONS
Refill policies:    Allow 2-3 business days for refills; controlled substances may take longer.  Contact your pharmacy at least 5 days prior to running out of medication and have them send an electronic request or submit request through the “request refill” option in your [employer] account.  Refills are not addressed on weekends; covering physicians do not authorize routine medications on weekends.  No narcotics or controlled substances are refilled after noon on Fridays or by on call physicians.  By law, narcotics must be electronically prescribed.  A 30 day supply with no refills is the maximum allowed.  If your prescription is due for a refill, you may be due for a follow up appointment.  To best provide you care, patients receiving routine medications need to be seen at least once a year.  Patients receiving narcotic/controlled substance medications need to be seen at least once every 3 months.  In the event that your preferred pharmacy does not have the requested medication in stock (e.g. Backordered), it is your responsibility to find another pharmacy that has the requested medication available.  We will gladly send a new prescription to that pharmacy at your request.    Scheduling Tests:    If your physician has ordered radiology tests such as MRI or CT scans, please contact Central Scheduling at [phone] right away to schedule the test.  Once scheduled, the [facility] Centralized Referral Team will work with your insurance carrier to obtain pre-certification or prior authorization.  Depending on your insurance carrier, approval may take 3-10 days.  It is highly recommended patients assure they have received an authorization before having a test performed.  If test is done without insurance authorization, patient may be responsible for the entire amount billed.      Precertification and Prior Authorizations:  If your physician has recommended that you have a procedure or additional testing performed the [facility]  Centralized Referral Team will contact your insurance carrier to obtain pre-certification or prior authorization.    You are strongly encouraged to contact your insurance carrier to verify that your procedure/test has been approved and is a COVERED benefit.  Although the [facility] Centralized Referral Team does its due diligence, the insurance carrier gives the disclaimer that \"Although the procedure is authorized, this does not guarantee payment.\"    Ultimately the patient is responsible for payment.   Thank you for your understanding in this matter.  Paperwork Completion:  If you require FMLA or disability paperwork for your recovery, please make sure to either drop it off or have it faxed to our office at [phone]. Be sure the form has your name and date of birth on it.  The form will be faxed to our Forms Department and they will complete it for you.  There is a 25$ fee for all forms that need to be filled out.  Please be aware there is a 10-14 day turnaround time.  You will need to sign a release of information (TORY) form if your paperwork does not come with one.  You may call the Forms Department with any questions at [phone].  Their fax number is [phone].

## 2025-04-08 NOTE — CHRONIC PAIN
Interventional pain medicine evaluation  Interim note 4/8/25:  Patient is 89-year-old female, with lumbar radiculopathy, lumbar stenosis.  She has had multiple lumbar epidural steroid injections with greater than 80% pain relief.  Last RADHA was on 9/30/2024, and has lasted greater than 3 months.  Currently patient is having right sided radiating pain.  Most of her pain is down her lateral thigh, to her toes, persistent numbness and tingling.  She does not report any back pain, or any left lower extremity pain.  She does report taking ibuprofen and Tylenol, which mildly improves her pain.  She does state that the transforaminal injection that was given on her left side worked better than the interlaminar.  She continues to exercise at home.  Denies any changes in bowel or bladder, fever, chills, CP, SOB  HPI:  Christina Giraldo is a 89 year old old female, originally referred to the pain clinic during her hospitalization in January 18, 2024 at which time she underwent a lumbar epidural steroid injection interlaminar at L4-5 for low back pain and neurogenic claudication.  She now presents for follow-up in the pain clinic reporting at least 50% relief of her lower back symptoms and right leg pain but still has persistent left radicular leg pain with ambulation which was successfully treated with injections..  1. Spinal stenosis of lumbar region with neurogenic claudication    2. Right lumbar radiculopathy    Overall the patient feels that she is doing much better with her left leg pain but now her right leg radicular pain seems to inhibit her activities of daily living.. The pain radiates into the right lower extremity all the way to the foot and ankle.  No recent injury/trauma.   Pt denies persistent numbness/tingling/weakness.  There is no incontinence of bowel/bladder. Coughing/sneezing/straining does not exacerbate the pain.     ALLERGIES:  Allergies[1]  MEDICATION LIST:  Current Outpatient Medications    Medication Sig Dispense Refill    Cholecalciferol (VITAMIN D) 50 MCG (2000 UT) Oral Tab Take by mouth.      Multiple Vitamins-Minerals (BIOTIN PLUS/CALCIUM/VIT D3) Oral Tab Take by mouth.      metoprolol tartrate 25 MG Oral Tab Take 1 tablet (25 mg total) by mouth 2x Daily(Beta Blocker). (Patient not taking: Reported on 10/16/2024) 60 tablet 3    nitrofurantoin monohydrate macro 100 MG Oral Cap Take 1 capsule (100 mg total) by mouth daily. (Patient not taking: Reported on 10/16/2024)      Polyethylene Glycol 3350 (MIRALAX OR) Take by mouth as needed.      levothyroxine 75 MCG Oral Tab Take 1 tablet (75 mcg total) by mouth every morning before breakfast. (Patient taking differently: Take 88 mcg by mouth every morning before breakfast.) 90 tablet 3    famotidine 20 MG Oral Tab Take 1 tablet (20 mg total) by mouth 2 (two) times daily. (Patient not taking: Reported on 10/16/2024) 180 tablet 3    acetaminophen 500 MG Oral Tab Take 2 tablets (1,000 mg total) by mouth every 6 (six) hours as needed for Pain.      Denosumab 60 MG/ML Subcutaneous Solution Prefilled Syringe Inject 1 mL (60 mg total) into the skin once. Historical documentation - see Epic Immunization Activity for administration details 1 mL 0    Estradiol (VAGIFEM) 10 MCG Vaginal Tab Place 10 mcg vaginally 3 (three) times a week. 48 tablet 3    ondansetron 4 MG Oral Tablet Dispersible Take 1 tablet (4 mg total) by mouth every 8 (eight) hours as needed for Nausea. 5 tablet 3    cetirizine (ZYRTEC ALLERGY) 10 MG Oral Tab Take 1 tablet by mouth daily as needed for Allergies. 15 tablet 11        REVIEW OF SYSTEMS:   Bowel/Bladder Incontinence: as above  Coughing/sneezing/straining does not exacerbate the pain.  Numbness/tingling: as above  Weakness: as above  Weight Loss: Negative   Fever: Negative   Cardiovascular:  No current chest pain or palpitations   Respiratory:  No current shortness of breath   Gastrointestinal:  No active ulcer  Genitourinary:   Negative  Psychiatric:  Negative  Musculoskeletal: As above  Neurological: As above  Denies chest pain, shortness of breath.    MEDICAL HISTORY:  Patient Active Problem List   Diagnosis    Hypothyroidism, unspecified type    Recurrent UTI    H/O: hysterectomy    Factor V Leiden mutation ([facility])    Compression fracture    Anemia    Actinic keratosis    PAF (paroxysmal atrial fibrillation) ([facility])    Senile osteoporosis    Pathologic fracture of vertebrae    L3-4 stable>L4-5 unstable grade1 spondylolisthesis    L5-S1 left mod-large/right mod far lateral, L4-5 mod left & left foraminal, L3-4 mod diffuse, L2-3 left mild-mod foraminal, L1-2 mod diffuse bulging discs    L3-4 mild-mod, L4-5 mod central stenosis    Bilateral neck pain    Right hand weakness    bilateral C5-6 radiculopathies    C6-7 left mild-mod, C5-6 right mild-mod paracentral/right mod foraminal, C3-4 mild-mod central & right foraminal2-3 right mild paracentral bulging discs    C4-5 right mod HNP    C6-7 left mod foraminal, C4-5 mild central stenosis    Thoracic back pain    Other closed displaced fracture of proximal end of left humerus    Tear of left rotator cuff, unspecified tear extent    Vitamin D deficiency    Other closed intra-articular fracture of distal end of right radius, initial encounter    Other closed intra-articular fracture of distal end of right radius with malunion, subsequent encounter    Right wrist pain    SX/GLOBAL/  /EOSC/RIGHT EXTENSOR TENOYYSIS AND TENOSYNOVECTOMY 4TH EXTENSOR COMPARTMENT / DOS 01/17/17 / EXP 04/17/17    Ruptured extensor tendon of hand or wrist, right, initial encounter    Chronic left-sided low back pain with left-sided sciatica    Chronic left sacroiliac joint pain    left L5 radiculopathy    L3-4 & L4-5 mild grade 1 spondylolisthesis that are mildly unstable    L5-S1 left mod-severe/right mild-mod foraminal stenosis    L5 chronic and L2 chronic compression fractures with L2 vertebral augmentation     T12 chronic compression fracture and s/p augmentation    Lumbar spinal stenosis    Lumbar radiculitis    Cramp of extremity    Gastroesophageal reflux disease without esophagitis    Hoarseness of voice    Protein-calorie malnutrition, unspecified severity (HCC)    Hypokalemia    Azotemia    Atrial fibrillation with rapid ventricular response (HCC)    Nausea and vomiting, unspecified vomiting type    Sciatica, unspecified laterality     Past Medical History:    Blood disorder    Cataract    Disorder of thyroid    Factor V Leiden mutation (HCC)    High cholesterol    Osteopenia    OTHER DISEASES    pancreatitis X 2    Problems with swallowing    Recurrent urinary tract infection       SURGICAL HISTORY:  Past Surgical History:   Procedure Laterality Date    Cataract      Cholecystectomy      because of pancreatic pseudocyst which was also removed.    Colonoscopy       - had upper and lower endoscopy; ; Dr Lagunas because of anemia - normal.    Excisional biospy right  1973    Hysterectomy      ORA/BSO in 's    Ir vertebroplasty            Other surgical history  11    cysto-dr. gerard       FAMILY HISTORY:  Family History   Problem Relation Age of Onset    Other (Other) Father         cva    Cancer Mother 80        breast cancer    Breast Cancer Mother 80        80    Cancer Other         breast    Breast Cancer Maternal Aunt 82        82       SOCIAL HISTORY:  Social History     Socioeconomic History    Marital status:      Spouse name: Not on file    Number of children: Not on file    Years of education: Not on file    Highest education level: Not on file   Occupational History    Not on file   Tobacco Use    Smoking status: Never    Smokeless tobacco: Never   Substance and Sexual Activity    Alcohol use: No     Alcohol/week: 0.0 standard drinks of alcohol    Drug use: No    Sexual activity: Not on file   Other Topics Concern     Service Not Asked    Blood Transfusions Not Asked     Caffeine Concern Yes     Comment: 1 cup of coffee daily    Occupational Exposure Not Asked    Hobby Hazards Not Asked    Sleep Concern Not Asked    Stress Concern Not Asked    Weight Concern Not Asked    Special Diet Not Asked    Back Care Not Asked    Exercise Yes    Bike Helmet Not Asked    Seat Belt Not Asked    Self-Exams Not Asked   Social History Narrative    The patient does not use an assistive device..      The patient does live in a home with stairs.     Social Drivers of Health     Food Insecurity: No Food Insecurity (1/17/2024)    Food Insecurity     Food Insecurity: Never true   Transportation Needs: No Transportation Needs (1/17/2024)    Transportation Needs     Lack of Transportation: No   Housing Stability: Low Risk  (1/17/2024)    Housing Stability     Housing Instability: No     Housing Instability Emergency: Not on file     Crib or Bassinette: Not on file     PHYSICAL EXAMINATION:  General: Alert and oriented x3, NAD, appears stated age, appropriate disposition and demeanor, answers questions  Head: normocephalic, atraumatic  Eyes: anicteric; no injection  Ears: no obvious deformities noted   Nose: externally grossly within normal limits, no unusual discharge or rhinorrhea   Throat: lips grossly within normal limits by visual exam externally  Neck: supple, trachea midline, no obvious JVD  Chest: no obvious visual deformity  Respiratory: Non labored   SLR: Positive at 15 degrees right lower extremity, negative left lower extremity  IMAGING:  Narrative   PROCEDURE: MRI SPINE LUMBAR (CPT=72148)     COMPARISON: [facility], CT ABDOMEN PELVIS IV CONTRAST NO ORAL (ER), 1/17/2024, 1:45 PM.  [facility], MRI SPINE LUMBAR (CPT=72148), 2/24/2017, 11:20 AM.     INDICATIONS: lumbar radiculopathy     TECHNIQUE: A variety of imaging planes and parameters were utilized for visualization of suspected pathology.     FINDINGS:  NUMERATION: For the purposes of this examination,  the lowest fully formed disc space is designated L5-S1.    BONES: Moderate chronic fracture deformity T12 status post kyphoplasty.  Mild-to-moderate chronic fracture deformity L2 status post kyphoplasty.  Moderate chronic appearing fracture deformity L5.  No acute fracture or osseous malalignment.  Multilevel  degenerative change with marginal osteophyte formation and facet hypertrophy.  Bone marrow signal is otherwise unremarkable.  CORD/CAUDA EQUINA: Normal caliber, contour, and signal intensity.  The conus terminates at T12.  PARASPINAL AREA: Unremarkable.    OTHER: Bilateral small T2 hyperintense foci in the kidneys incompletely characterized but probably representing small cysts.  Nonspecific urinary bladder distension.     LUMBAR DISC LEVELS:  L1-L2: Circumferential disc bulge with small central superimposed protrusion.  There is ligamentum flavum hypertrophy.  Mild central canal narrowing.  Moderate bilateral foraminal narrowing noted with facet arthropathy.  Similar findings were seen  previously.  L2-L3: Circumferential disc bulge with ligamentum flavum hypertrophy.  Mild central canal narrowing.  Moderate bilateral foraminal narrowing right greater than left with facet arthropathy.  Overall findings are mildly progressed.  L3-L4: Circumferential disc bulge with ligamentum flavum hypertrophy.  There is mild-to-moderate central canal narrowing mildly progressed.  Mild bilateral foraminal narrowing with facet arthropathy.  L4-L5: Circumferential disc bulge with ligamentum flavum hypertrophy.  Severe central canal narrowing.  This is progressed since the prior study.  Mild-to-moderate bilateral foraminal narrowing with facet arthropathy.  L5-S1: Circumferential disc bulge with ligamentum flavum hypertrophy.  Moderate central canal narrowing is mildly progressed.  Left greater than right bilateral moderate foraminal narrowing facet arthropathy.            Impression   CONCLUSION:     Chronic T12, L2 and L5  vertebral compression deformity status post T12 and L2 kyphoplasties.  No acute fracture or osseous malalignment.     Mildly progressed multilevel lumbar spine degenerative changes described above.  The most significant level represents L4-L5 where there is severe central canal narrowing and mild-to-moderate bilateral foraminal narrowing.     Lesser incidental findings as above.     A preliminary report was issued by the [facility] teleradiology service. There are no major discrepancies.       Dictated by (CST): Jose Maria Murray MD on 1/18/2024 at 6:14 AM      Finalized by (CST): Jose Maria Murray MD on 1/18/2024 at 6:23 AM           [place] PHYSICIAN MONITORING PROGRAM REVIEWED  Yes    ASSESSMENT/PLAN:   Lumbar radiculopathy  Lumbar stenosis  Lumbar spondylosis  -Start gabapentin 300 twice daily, patient was on it a year ago which she reports helped significantly for her radiating pain, she reported no side effects from the medication  -Scheduled for right transforaminal RADHA L4-5 and L5-S1, local  -Patient is not a diabetic and she is on no anticoagulation or antiplatelets  Pt will return to clinic for follow up before the next medication refill.   CHRISTOPHER Ohara   Comprehensive analgesic plan was formulated. Conservative vs. Aggressive measures were discussed at length including pharmacotherapy (eg. Anti- inflammatories, muscle relaxants, neuropathic medications, oral steroids, analgesics), injections, and further testing. Risks and benefits of all options were discussed at length to patients satisfaction during a comprehensive interactive discussion. All questions were answered during extended questions and answer session. Patient agreeable to discussion plan. Greater than 50% of the time was spent with counseling (nature of discussion centered around pain, therapy, and treatment options), face to face time, time spent reviewing data, obtaining patient information and discussing the care with the  patients health care providers.          [1]   Allergies  Allergen Reactions    Cephalexin OTHER (SEE COMMENTS)     Pt does not remember.     Codeine [Opioid Analgesics] NAUSEA AND VOMITING    Etodolac OTHER (SEE COMMENTS)    Morphine NAUSEA AND VOMITING    Terbinafine OTHER (SEE COMMENTS)     Medication was not working.    Tramadol NAUSEA AND VOMITING    Ciprofloxacin DIZZINESS    Lamisil DIARRHEA    Omeprazole NAUSEA AND VOMITING

## 2025-04-09 ENCOUNTER — TELEPHONE (OUTPATIENT)
Dept: PAIN CLINIC | Facility: HOSPITAL | Age: OVER 89
End: 2025-04-09

## 2025-04-09 RX ORDER — GABAPENTIN 300 MG/1
300 CAPSULE ORAL 2 TIMES DAILY
Refills: 0 | OUTPATIENT
Start: 2025-04-09 | End: 2025-05-09

## 2025-04-09 NOTE — TELEPHONE ENCOUNTER
Order Questions    Question Answer   Anesthesia Type Local   Procedure Transforaminal   Laterality/Level right   CPT (Hit enter after each entry) INJECTION, ANESTHETIC/STEROID, TRANSFORAMINAL EPIDURAL; LUMBAR/SACRAL, SINGLE LEVEL [20326]    INJECTION, ANESTHETIC/STEROID, TRANSFORAMINAL EPIDURAL; LUMBAR/SACRAL, ADD'L LEVEL [[id number]]   C-ARM Yes   Medications to hold none   Implants No   Medical clearance requested (will send to Pain Navigator) No   Patient has Medicare coverage? Yes   Comments (Please list entire procedure name here.) Right L4-5 and L5-S1 TFESI       Patient has Medicare A & B no prior auth required   gluteal cleft

## 2025-04-24 PROBLEM — M54.16 SPINAL STENOSIS OF LUMBAR REGION WITH RADICULOPATHY: Status: ACTIVE | Noted: 2017-07-26

## 2025-04-24 PROBLEM — M48.061 SPINAL STENOSIS OF LUMBAR REGION WITH RADICULOPATHY: Status: ACTIVE | Noted: 2017-07-26

## 2025-06-11 ENCOUNTER — APPOINTMENT (OUTPATIENT)
Dept: GENERAL RADIOLOGY | Facility: HOSPITAL | Age: OVER 89
End: 2025-06-11
Attending: EMERGENCY MEDICINE
Payer: MEDICARE

## 2025-06-11 ENCOUNTER — HOSPITAL ENCOUNTER (EMERGENCY)
Facility: HOSPITAL | Age: OVER 89
Discharge: HOME | End: 2025-06-11
Attending: EMERGENCY MEDICINE
Payer: MEDICARE

## 2025-06-11 VITALS
TEMPERATURE: 98 F | DIASTOLIC BLOOD PRESSURE: 76 MMHG | HEART RATE: 71 BPM | SYSTOLIC BLOOD PRESSURE: 162 MMHG | OXYGEN SATURATION: 95 % | RESPIRATION RATE: 18 BRPM

## 2025-06-11 DIAGNOSIS — M25.551 RIGHT HIP PAIN: Primary | ICD-10-CM

## 2025-06-11 PROCEDURE — 72100 X-RAY EXAM L-S SPINE 2/3 VWS: CPT | Performed by: EMERGENCY MEDICINE

## 2025-06-11 PROCEDURE — 99283 EMERGENCY DEPT VISIT LOW MDM: CPT

## 2025-06-11 PROCEDURE — 99284 EMERGENCY DEPT VISIT MOD MDM: CPT

## 2025-06-11 PROCEDURE — 73502 X-RAY EXAM HIP UNI 2-3 VIEWS: CPT | Performed by: EMERGENCY MEDICINE

## 2025-06-11 RX ORDER — HYDROCODONE BITARTRATE AND ACETAMINOPHEN 5; 325 MG/1; MG/1
1 TABLET ORAL ONCE
Refills: 0 | Status: COMPLETED | OUTPATIENT
Start: 2025-06-11 | End: 2025-06-11

## 2025-06-11 RX ORDER — HYDROCODONE BITARTRATE AND ACETAMINOPHEN 5; 325 MG/1; MG/1
1 TABLET ORAL EVERY 6 HOURS PRN
Qty: 10 TABLET | Refills: 0 | Status: SHIPPED | OUTPATIENT
Start: 2025-06-11 | End: 2025-06-18

## 2025-06-12 ENCOUNTER — HOSPITAL ENCOUNTER (EMERGENCY)
Facility: HOSPITAL | Age: OVER 89
Discharge: HOME | End: 2025-06-12
Attending: EMERGENCY MEDICINE
Payer: MEDICARE

## 2025-06-12 VITALS
HEART RATE: 87 BPM | BODY MASS INDEX: 22 KG/M2 | OXYGEN SATURATION: 99 % | DIASTOLIC BLOOD PRESSURE: 71 MMHG | WEIGHT: 120 LBS | SYSTOLIC BLOOD PRESSURE: 170 MMHG | RESPIRATION RATE: 20 BRPM | TEMPERATURE: 98 F

## 2025-06-12 DIAGNOSIS — R11.2 NAUSEA AND VOMITING, UNSPECIFIED VOMITING TYPE: Primary | ICD-10-CM

## 2025-06-12 DIAGNOSIS — T50.905A MEDICATION SIDE EFFECT, INITIAL ENCOUNTER: ICD-10-CM

## 2025-06-12 LAB
ALBUMIN SERPL-MCNC: 4.9 G/DL (ref 3.2–4.8)
ALBUMIN/GLOB SERPL: 2.3 {RATIO} (ref 1–2)
ALP LIVER SERPL-CCNC: 68 U/L (ref 55–142)
ALT SERPL-CCNC: 89 U/L (ref 10–49)
ANION GAP SERPL CALC-SCNC: 12 MMOL/L (ref 0–18)
AST SERPL-CCNC: 58 U/L (ref ?–34)
ATRIAL RATE: 78 BPM
BASOPHILS # BLD AUTO: 0.05 X10(3) UL (ref 0–0.2)
BASOPHILS NFR BLD AUTO: 0.3 %
BILIRUB SERPL-MCNC: 1 MG/DL (ref 0.2–0.9)
BUN BLD-MCNC: 17 MG/DL (ref 9–23)
BUN/CREAT SERPL: 21.5 (ref 10–20)
CALCIUM BLD-MCNC: 9.3 MG/DL (ref 8.7–10.4)
CHLORIDE SERPL-SCNC: 100 MMOL/L (ref 98–112)
CO2 SERPL-SCNC: 22 MMOL/L (ref 21–32)
CREAT BLD-MCNC: 0.79 MG/DL (ref 0.55–1.02)
DEPRECATED RDW RBC AUTO: 45.4 FL (ref 35.1–46.3)
EGFRCR SERPLBLD CKD-EPI 2021: 71 ML/MIN/1.73M2 (ref 60–?)
EOSINOPHIL # BLD AUTO: 0.03 X10(3) UL (ref 0–0.7)
EOSINOPHIL NFR BLD AUTO: 0.2 %
ERYTHROCYTE [DISTWIDTH] IN BLOOD BY AUTOMATED COUNT: 13.1 % (ref 11–15)
GLOBULIN PLAS-MCNC: 2.1 G/DL (ref 2–3.5)
GLUCOSE BLD-MCNC: 166 MG/DL (ref 70–99)
HCT VFR BLD AUTO: 41.3 % (ref 35–48)
HGB BLD-MCNC: 13.7 G/DL (ref 12–16)
IMM GRANULOCYTES # BLD AUTO: 0.17 X10(3) UL (ref 0–1)
IMM GRANULOCYTES NFR BLD: 1.1 %
LYMPHOCYTES # BLD AUTO: 1.15 X10(3) UL (ref 1–4)
LYMPHOCYTES NFR BLD AUTO: 7.2 %
MCH RBC QN AUTO: 31 PG (ref 26–34)
MCHC RBC AUTO-ENTMCNC: 33.2 G/DL (ref 31–37)
MCV RBC AUTO: 93.4 FL (ref 80–100)
MONOCYTES # BLD AUTO: 1.01 X10(3) UL (ref 0.1–1)
MONOCYTES NFR BLD AUTO: 6.3 %
NEUTROPHILS # BLD AUTO: 13.53 X10 (3) UL (ref 1.5–7.7)
NEUTROPHILS # BLD AUTO: 13.53 X10(3) UL (ref 1.5–7.7)
NEUTROPHILS NFR BLD AUTO: 84.9 %
OSMOLALITY SERPL CALC.SUM OF ELEC: 283 MOSM/KG (ref 275–295)
P AXIS: 39 DEGREES
P-R INTERVAL: 156 MS
PLATELET # BLD AUTO: 227 10(3)UL (ref 150–450)
POTASSIUM SERPL-SCNC: 4.5 MMOL/L (ref 3.5–5.1)
PROT SERPL-MCNC: 7 G/DL (ref 5.7–8.2)
Q-T INTERVAL: 394 MS
QRS DURATION: 90 MS
QTC CALCULATION (BEZET): 449 MS
R AXIS: -35 DEGREES
RBC # BLD AUTO: 4.42 X10(6)UL (ref 3.8–5.3)
SODIUM SERPL-SCNC: 134 MMOL/L (ref 136–145)
T AXIS: 23 DEGREES
VENTRICULAR RATE: 78 BPM
WBC # BLD AUTO: 15.9 X10(3) UL (ref 4–11)

## 2025-06-12 PROCEDURE — 93010 ELECTROCARDIOGRAM REPORT: CPT

## 2025-06-12 PROCEDURE — 93005 ELECTROCARDIOGRAM TRACING: CPT

## 2025-06-12 PROCEDURE — 85025 COMPLETE CBC W/AUTO DIFF WBC: CPT

## 2025-06-12 PROCEDURE — 99284 EMERGENCY DEPT VISIT MOD MDM: CPT

## 2025-06-12 PROCEDURE — 80053 COMPREHEN METABOLIC PANEL: CPT

## 2025-06-12 PROCEDURE — 80053 COMPREHEN METABOLIC PANEL: CPT | Performed by: EMERGENCY MEDICINE

## 2025-06-12 PROCEDURE — 96374 THER/PROPH/DIAG INJ IV PUSH: CPT

## 2025-06-12 PROCEDURE — 85025 COMPLETE CBC W/AUTO DIFF WBC: CPT | Performed by: EMERGENCY MEDICINE

## 2025-06-12 RX ORDER — ONDANSETRON 2 MG/ML
4 INJECTION INTRAMUSCULAR; INTRAVENOUS ONCE
Status: COMPLETED | OUTPATIENT
Start: 2025-06-12 | End: 2025-06-12

## 2025-06-12 RX ORDER — IBUPROFEN 400 MG/1
400 TABLET, FILM COATED ORAL ONCE
Status: COMPLETED | OUTPATIENT
Start: 2025-06-12 | End: 2025-06-12

## 2025-06-12 RX ORDER — ACETAMINOPHEN 500 MG
500 TABLET ORAL ONCE
Status: COMPLETED | OUTPATIENT
Start: 2025-06-12 | End: 2025-06-12

## 2025-06-12 NOTE — DISCHARGE INSTRUCTIONS
Do not take pain medication on an empty stomach.  Return if abdominal pain fever or vomiting  Follow-up with your doctor

## 2025-06-12 NOTE — ED INITIAL ASSESSMENT (HPI)
Patient arrives to ER for evaluation of vomiting. Patient took norco PTA on an empty stomach. Patient began throwing up bile shortly after.

## 2025-06-12 NOTE — ED PROVIDER NOTES
Patient Seen in: [facility] Emergency Department        History  Chief Complaint   Patient presents with    Nausea/Vomiting/Diarrhea     Stated Complaint:     Subjective:   HPI            The patient is a 89-year-old female with chronic low back and right hip pain that has been worsening.  She has had difficulty walking over the last few weeks.  She was seen in the ER yesterday and prescribed Norco.  She took a Norco this morning at 6:30 AM before having breakfast.  About 2 hours later she started developing nausea and vomiting.  On arrival to the emergency department she states she feels much better.  No abdominal pain.  No nausea.  No diarrhea.  No dysuria.  No fevers or chills.  Side note her hip pain did feel better with the Norco.  She complains of feeling hungry      Objective:     Past Medical History:    Blood disorder    Cataract    Disorder of thyroid    Factor V Leiden mutation (HCC)    High cholesterol    Osteopenia    OTHER DISEASES    pancreatitis X 2    Problems with swallowing    Recurrent urinary tract infection              Past Surgical History:   Procedure Laterality Date    Cataract      Cholecystectomy      because of pancreatic pseudocyst which was also removed.    Colonoscopy       - had upper and lower endoscopy; ; Dr Lagunas because of anemia - normal.    Excisional biospy right  1973    Hysterectomy      ORA/BSO in 's    Ir vertebroplasty            Other surgical history  11    cysto-dr. gerard                Social History     Socioeconomic History    Marital status:    Tobacco Use    Smoking status: Never    Smokeless tobacco: Never   Vaping Use    Vaping status: Never Used   Substance and Sexual Activity    Alcohol use: No     Alcohol/week: 0.0 standard drinks of alcohol    Drug use: No   Other Topics Concern    Caffeine Concern Yes     Comment: 1 cup of coffee daily    Exercise Yes   Social History Narrative    The patient does not use an assistive  device..      The patient does live in a home with stairs.     Social Drivers of Health     Food Insecurity: No Food Insecurity (1/17/2024)    Food Insecurity     Food Insecurity: Never true   Transportation Needs: No Transportation Needs (1/17/2024)    Transportation Needs     Lack of Transportation: No   Housing Stability: Low Risk  (1/17/2024)    Housing Stability     Housing Instability: No                                Physical Exam    ED Triage Vitals [06/12/25 1151]   BP (!) 182/70   Pulse 93   Resp 20   Temp 97.7 °F (36.5 °C)   Temp src Temporal   SpO2 100 %   O2 Device None (Room air)       Current Vitals:   Vital Signs  BP: (!) 170/71  Pulse: 87  Resp: 20  Temp: 97.7 °F (36.5 °C)  Temp src: Temporal  MAP (mmHg): 98    Oxygen Therapy  SpO2: 99 %  O2 Device: None (Room air)            Physical Exam  Vitals and nursing note reviewed.   Constitutional:       General: She is not in acute distress.     Appearance: Normal appearance. She is well-developed. She is not ill-appearing.   HENT:      Head: Normocephalic and atraumatic.      Mouth/Throat:      Mouth: Mucous membranes are moist.   Eyes:      Conjunctiva/sclera: Conjunctivae normal.      Pupils: Pupils are equal, round, and reactive to light.   Neck:      Vascular: No JVD.   Cardiovascular:      Rate and Rhythm: Normal rate and regular rhythm.      Heart sounds: Normal heart sounds. No murmur heard.  Pulmonary:      Effort: Pulmonary effort is normal.      Breath sounds: Normal breath sounds.   Abdominal:      General: Bowel sounds are normal. There is no distension.      Palpations: Abdomen is soft. There is no mass.      Tenderness: There is no abdominal tenderness. There is no right CVA tenderness, left CVA tenderness, guarding or rebound.   Musculoskeletal:         General: Normal range of motion.      Cervical back: Normal range of motion and neck supple.   Skin:     General: Skin is warm and dry.      Capillary Refill: Capillary refill takes less  than 2 seconds.      Findings: No rash.   Neurological:      General: No focal deficit present.      Mental Status: She is alert and oriented to person, place, and time.      Deep Tendon Reflexes: Reflexes are normal and symmetric.   Psychiatric:         Judgment: Judgment normal.           Differential diagnosis includes medication reaction, gastritis, pancreatitis, gastroenteritis      ED Course  Labs Reviewed   COMP METABOLIC PANEL (14) - Abnormal; Notable for the following components:       Result Value    Glucose 166 (*)     Sodium 134 (*)     BUN/CREA Ratio 21.5 (*)     ALT 89 (*)     AST 58 (*)     Bilirubin, Total 1.0 (*)     Albumin 4.9 (*)     A/G Ratio 2.3 (*)     All other components within normal limits   CBC WITH DIFFERENTIAL WITH PLATELET - Abnormal; Notable for the following components:    WBC 15.9 (*)     Neutrophil Absolute Prelim 13.53 (*)     Neutrophil Absolute 13.53 (*)     Monocyte Absolute 1.01 (*)     All other components within normal limits   RAINBOW DRAW BLUE   RAINBOW DRAW GOLD   RAINBOW DRAW LAVENDER   RAINBOW DRAW LIGHT GREEN     EKG    Rate, intervals and axes as noted on EKG Report.  Rate: 78  Rhythm: Sinus Rhythm  Reading: No ST elevation or significant arrhythmia                                MDM             Medical Decision Making  Patient tolerated lunch tray feels much better.  No nausea.  No complaints.  Abdomen soft nontender on repeat exam vital signs stable  Likely reaction from taking opioid on an empty stomach  Leukocytosis likely reactive  Home with daughter  Tylenol and ibuprofen given for hip pain after she ate lunch    Problems Addressed:  Medication side effect, initial encounter: acute illness or injury  Nausea and vomiting, unspecified vomiting type: acute illness or injury    Amount and/or Complexity of Data Reviewed  Independent Historian: EMS  External Data Reviewed: labs.     Details: Patient already with gallbladder removed, LFTs are stable over last  year  Labs: ordered.  ECG/medicine tests: ordered and independent interpretation performed. Decision-making details documented in ED Course.    Risk  OTC drugs.        Disposition and Plan     Clinical Impression:  1. Nausea and vomiting, unspecified vomiting type    2. Medication side effect, initial encounter         Disposition:  Discharge  6/12/2025  2:30 pm    Follow-up:  Marce Smith MD    Schedule an appointment as soon as possible for a visit      We recommend that you schedule follow up care with a primary care provider within the next three months to obtain basic health screening including reassessment of your blood pressure.      Medications Prescribed:  Discharge Medication List as of 6/12/2025  2:30 PM                Supplementary Documentation:

## 2025-07-18 ENCOUNTER — HOSPITAL ENCOUNTER (OUTPATIENT)
Facility: HOSPITAL | Age: OVER 89
Setting detail: OBSERVATION
Discharge: HOME | End: 2025-07-19
Attending: EMERGENCY MEDICINE | Admitting: INTERNAL MEDICINE
Payer: MEDICARE

## 2025-07-18 ENCOUNTER — APPOINTMENT (OUTPATIENT)
Dept: CT IMAGING | Facility: HOSPITAL | Age: OVER 89
End: 2025-07-18
Attending: EMERGENCY MEDICINE
Payer: MEDICARE

## 2025-07-18 ENCOUNTER — APPOINTMENT (OUTPATIENT)
Dept: GENERAL RADIOLOGY | Facility: HOSPITAL | Age: OVER 89
End: 2025-07-18
Attending: EMERGENCY MEDICINE
Payer: MEDICARE

## 2025-07-18 ENCOUNTER — APPOINTMENT (OUTPATIENT)
Dept: CV DIAGNOSTICS | Facility: HOSPITAL | Age: OVER 89
End: 2025-07-18
Attending: INTERNAL MEDICINE
Payer: MEDICARE

## 2025-07-18 DIAGNOSIS — S00.03XA CONTUSION OF SCALP, INITIAL ENCOUNTER: ICD-10-CM

## 2025-07-18 DIAGNOSIS — R55 SYNCOPE AND COLLAPSE: Primary | ICD-10-CM

## 2025-07-18 DIAGNOSIS — R11.2 NAUSEA AND VOMITING IN ADULT: ICD-10-CM

## 2025-07-18 LAB
ALBUMIN SERPL-MCNC: 4.4 G/DL (ref 3.2–4.8)
ALP LIVER SERPL-CCNC: 40 U/L (ref 55–142)
ALT SERPL-CCNC: 18 U/L (ref 10–49)
ANION GAP SERPL CALC-SCNC: 8 MMOL/L (ref 0–18)
APTT PPP: 19.9 SECONDS (ref 23–36)
AST SERPL-CCNC: 20 U/L (ref ?–34)
ATRIAL RATE: 77 BPM
BASOPHILS # BLD AUTO: 0.04 X10(3) UL (ref 0–0.2)
BASOPHILS NFR BLD AUTO: 0.5 %
BILIRUB DIRECT SERPL-MCNC: 0.2 MG/DL (ref ?–0.3)
BILIRUB SERPL-MCNC: 0.7 MG/DL (ref 0.2–0.9)
BILIRUB UR QL: NEGATIVE
BUN BLD-MCNC: 14 MG/DL (ref 9–23)
BUN/CREAT SERPL: 18.9 (ref 10–20)
CALCIUM BLD-MCNC: 8.9 MG/DL (ref 8.7–10.4)
CHLORIDE SERPL-SCNC: 106 MMOL/L (ref 98–112)
CLARITY UR: CLEAR
CO2 SERPL-SCNC: 20 MMOL/L (ref 21–32)
CREAT BLD-MCNC: 0.74 MG/DL (ref 0.55–1.02)
D DIMER PPP FEU-MCNC: 4.95 UG/ML FEU (ref ?–0.91)
DEPRECATED RDW RBC AUTO: 43.8 FL (ref 35.1–46.3)
EGFRCR SERPLBLD CKD-EPI 2021: 76 ML/MIN/1.73M2 (ref 60–?)
EOSINOPHIL # BLD AUTO: 0.28 X10(3) UL (ref 0–0.7)
EOSINOPHIL NFR BLD AUTO: 3.5 %
ERYTHROCYTE [DISTWIDTH] IN BLOOD BY AUTOMATED COUNT: 12.8 % (ref 11–15)
EST. AVERAGE GLUCOSE BLD GHB EST-MCNC: 128 MG/DL (ref 68–126)
GLUCOSE BLD-MCNC: 189 MG/DL (ref 70–99)
GLUCOSE BLDC GLUCOMTR-MCNC: 178 MG/DL (ref 70–99)
GLUCOSE UR-MCNC: 30 MG/DL
HBA1C MFR BLD: 6.1 % (ref ?–5.7)
HCT VFR BLD AUTO: 40.9 % (ref 35–48)
HGB BLD-MCNC: 13.7 G/DL (ref 12–16)
HGB UR QL STRIP.AUTO: NEGATIVE
IMM GRANULOCYTES # BLD AUTO: 0.06 X10(3) UL (ref 0–1)
IMM GRANULOCYTES NFR BLD: 0.7 %
INR BLD: 0.91 (ref 0.8–1.2)
KETONES UR-MCNC: 10 MG/DL
LEUKOCYTE ESTERASE UR QL STRIP.AUTO: NEGATIVE
LYMPHOCYTES # BLD AUTO: 3.12 X10(3) UL (ref 1–4)
LYMPHOCYTES NFR BLD AUTO: 38.7 %
MCH RBC QN AUTO: 31.1 PG (ref 26–34)
MCHC RBC AUTO-ENTMCNC: 33.5 G/DL (ref 31–37)
MCV RBC AUTO: 93 FL (ref 80–100)
MONOCYTES # BLD AUTO: 0.81 X10(3) UL (ref 0.1–1)
MONOCYTES NFR BLD AUTO: 10 %
NEUTROPHILS # BLD AUTO: 3.76 X10 (3) UL (ref 1.5–7.7)
NEUTROPHILS # BLD AUTO: 3.76 X10(3) UL (ref 1.5–7.7)
NEUTROPHILS NFR BLD AUTO: 46.6 %
NITRITE UR QL STRIP.AUTO: NEGATIVE
OSMOLALITY SERPL CALC.SUM OF ELEC: 284 MOSM/KG (ref 275–295)
P AXIS: 72 DEGREES
P-R INTERVAL: 162 MS
PH UR: 6.5 (ref 5–8)
PLATELET # BLD AUTO: 205 10(3)UL (ref 150–450)
POTASSIUM SERPL-SCNC: 3.7 MMOL/L (ref 3.5–5.1)
PROT SERPL-MCNC: 6.4 G/DL (ref 5.7–8.2)
PROT UR-MCNC: 70 MG/DL
PROTHROMBIN TIME: 12.8 SECONDS (ref 11.6–14.8)
Q-T INTERVAL: 390 MS
QRS DURATION: 94 MS
QTC CALCULATION (BEZET): 441 MS
R AXIS: -2 DEGREES
RBC # BLD AUTO: 4.4 X10(6)UL (ref 3.8–5.3)
SODIUM SERPL-SCNC: 134 MMOL/L (ref 136–145)
SP GR UR STRIP: 1.01 (ref 1–1.03)
T AXIS: 62 DEGREES
TROPONIN I SERPL HS-MCNC: 32 NG/L (ref ?–34)
TSI SER-ACNC: 1.63 UIU/ML (ref 0.55–4.78)
UROBILINOGEN UR STRIP-ACNC: NORMAL
VENTRICULAR RATE: 77 BPM
WBC # BLD AUTO: 8.1 X10(3) UL (ref 4–11)

## 2025-07-18 PROCEDURE — 85379 FIBRIN DEGRADATION QUANT: CPT | Performed by: EMERGENCY MEDICINE

## 2025-07-18 PROCEDURE — 85025 COMPLETE CBC W/AUTO DIFF WBC: CPT | Performed by: EMERGENCY MEDICINE

## 2025-07-18 PROCEDURE — 96361 HYDRATE IV INFUSION ADD-ON: CPT

## 2025-07-18 PROCEDURE — 85730 THROMBOPLASTIN TIME PARTIAL: CPT | Performed by: EMERGENCY MEDICINE

## 2025-07-18 PROCEDURE — 80048 BASIC METABOLIC PNL TOTAL CA: CPT | Performed by: EMERGENCY MEDICINE

## 2025-07-18 PROCEDURE — 96374 THER/PROPH/DIAG INJ IV PUSH: CPT

## 2025-07-18 PROCEDURE — 85610 PROTHROMBIN TIME: CPT | Performed by: EMERGENCY MEDICINE

## 2025-07-18 PROCEDURE — 81001 URINALYSIS AUTO W/SCOPE: CPT | Performed by: EMERGENCY MEDICINE

## 2025-07-18 PROCEDURE — 84443 ASSAY THYROID STIM HORMONE: CPT | Performed by: EMERGENCY MEDICINE

## 2025-07-18 PROCEDURE — 80076 HEPATIC FUNCTION PANEL: CPT | Performed by: EMERGENCY MEDICINE

## 2025-07-18 PROCEDURE — 96372 THER/PROPH/DIAG INJ SC/IM: CPT

## 2025-07-18 PROCEDURE — 99285 EMERGENCY DEPT VISIT HI MDM: CPT

## 2025-07-18 PROCEDURE — 70450 CT HEAD/BRAIN W/O DYE: CPT | Performed by: EMERGENCY MEDICINE

## 2025-07-18 PROCEDURE — 93005 ELECTROCARDIOGRAM TRACING: CPT

## 2025-07-18 PROCEDURE — 93010 ELECTROCARDIOGRAM REPORT: CPT

## 2025-07-18 PROCEDURE — 84484 ASSAY OF TROPONIN QUANT: CPT | Performed by: EMERGENCY MEDICINE

## 2025-07-18 PROCEDURE — 71260 CT THORAX DX C+: CPT | Performed by: EMERGENCY MEDICINE

## 2025-07-18 PROCEDURE — 82962 GLUCOSE BLOOD TEST: CPT

## 2025-07-18 PROCEDURE — 83036 HEMOGLOBIN GLYCOSYLATED A1C: CPT | Performed by: INTERNAL MEDICINE

## 2025-07-18 PROCEDURE — 71045 X-RAY EXAM CHEST 1 VIEW: CPT | Performed by: EMERGENCY MEDICINE

## 2025-07-18 RX ORDER — HEPARIN SODIUM 5000 [USP'U]/ML
5000 INJECTION, SOLUTION INTRAVENOUS; SUBCUTANEOUS EVERY 8 HOURS SCHEDULED
Status: DISCONTINUED | OUTPATIENT
Start: 2025-07-18 | End: 2025-07-19

## 2025-07-18 RX ORDER — SODIUM CHLORIDE 9 MG/ML
INJECTION, SOLUTION INTRAVENOUS CONTINUOUS
Status: DISCONTINUED | OUTPATIENT
Start: 2025-07-18 | End: 2025-07-19

## 2025-07-18 RX ORDER — NITROFURANTOIN 25; 75 MG/1; MG/1
100 CAPSULE ORAL DAILY
COMMUNITY

## 2025-07-18 RX ORDER — ACETAMINOPHEN 500 MG
1000 TABLET ORAL ONCE
Status: COMPLETED | OUTPATIENT
Start: 2025-07-18 | End: 2025-07-18

## 2025-07-18 RX ORDER — DULOXETIN HYDROCHLORIDE 20 MG/1
20 CAPSULE, DELAYED RELEASE ORAL DAILY
COMMUNITY
Start: 2025-06-17

## 2025-07-18 RX ORDER — LEVOTHYROXINE SODIUM 88 UG/1
88 TABLET ORAL
Status: DISCONTINUED | OUTPATIENT
Start: 2025-07-19 | End: 2025-07-19

## 2025-07-18 RX ORDER — ONDANSETRON 2 MG/ML
4 INJECTION INTRAMUSCULAR; INTRAVENOUS ONCE
Status: COMPLETED | OUTPATIENT
Start: 2025-07-18 | End: 2025-07-18

## 2025-07-18 RX ORDER — ONDANSETRON 2 MG/ML
INJECTION INTRAMUSCULAR; INTRAVENOUS
Status: DISPENSED
Start: 2025-07-18 | End: 2025-07-18

## 2025-07-18 RX ORDER — MINERAL OIL AND PETROLATUM 150; 830 MG/G; MG/G
OINTMENT OPHTHALMIC AS NEEDED
Status: DISCONTINUED | OUTPATIENT
Start: 2025-07-18 | End: 2025-07-19

## 2025-07-18 RX ORDER — LEVOTHYROXINE SODIUM 88 UG/1
88 TABLET ORAL
COMMUNITY
Start: 2025-06-13

## 2025-07-18 RX ORDER — ACETAMINOPHEN 325 MG/1
650 TABLET ORAL EVERY 6 HOURS PRN
Status: DISCONTINUED | OUTPATIENT
Start: 2025-07-18 | End: 2025-07-19

## 2025-07-18 RX ORDER — ACETAMINOPHEN 500 MG
500 TABLET ORAL EVERY 4 HOURS PRN
Status: DISCONTINUED | OUTPATIENT
Start: 2025-07-18 | End: 2025-07-19

## 2025-07-18 RX ORDER — IBUPROFEN 400 MG/1
400 TABLET, FILM COATED ORAL ONCE
Status: COMPLETED | OUTPATIENT
Start: 2025-07-18 | End: 2025-07-18

## 2025-07-18 NOTE — ED QUICK NOTES
PCT collected Accucheck. Blood glucose resulted as 178. Pt tolerated well. Results communicated to MD and RN.

## 2025-07-18 NOTE — ED QUICK NOTES
Orders for admission, patient is aware of plan and ready to go upstairs. Any questions, please call ED RN Magdalena at extension [phone].     Patient Covid vaccination status: Fully vaccinated     COVID Test Ordered in ED: None    COVID Suspicion at Admission: N/A    Running Infusions: Medication Infusions[1] None    Mental Status/LOC at time of transport: AxOx4    Other pertinent information:   CIWA score: N/A   NIH score:  N/A             [1]

## 2025-07-18 NOTE — ED PROVIDER NOTES
Patient Seen in: [facility] Emergency Department        History  Chief Complaint   Patient presents with    Fall    Nausea/Vomiting/Diarrhea     Stated Complaint: n/v fall    Subjective:   HPI                  Objective:     Past Medical History:    Blood disorder    Cataract    Disorder of thyroid    Factor V Leiden mutation (HCC)    High cholesterol    Osteopenia    OTHER DISEASES    pancreatitis X 2    Problems with swallowing    Recurrent urinary tract infection              Past Surgical History:   Procedure Laterality Date    Cataract      Cholecystectomy      because of pancreatic pseudocyst which was also removed.    Colonoscopy       - had upper and lower endoscopy; ; Dr Lagunas because of anemia - normal.    Excisional biospy right  1973    Hysterectomy      ORA/BSO in     Ir vertebroplasty            Other surgical history  11    cysto-dr. gerard                No pertinent social history.                              Physical Exam    ED Triage Vitals   BP 25 0704 157/84   Pulse 25 0704 78   Resp 25 0704 12   Temp 25 0705 97.6 °F (36.4 °C)   Temp src 25 0705 Temporal   SpO2 25 0704 97 %   O2 Device 25 0704 None (Room air)       Current Vitals:   Vital Signs  BP: 131/62  Pulse: 83  Resp: 22  Temp: 97.6 °F (36.4 °C)  Temp src: Temporal  MAP (mmHg): 81    Oxygen Therapy  SpO2: 95 %  O2 Device: None (Room air)            Physical Exam            ED Course  Labs Reviewed   BASIC METABOLIC PANEL (8) - Abnormal; Notable for the following components:       Result Value    Glucose 189 (*)     Sodium 134 (*)     CO2 20.0 (*)     All other components within normal limits   HEPATIC FUNCTION PANEL (7) - Abnormal; Notable for the following components:    Alkaline Phosphatase 40 (*)     All other components within normal limits   PTT, ACTIVATED - Abnormal; Notable for the following components:    PTT 19.9 (*)     All other components within normal  limits   D-DIMER - Abnormal; Notable for the following components:    D-Dimer 4.95 (*)     All other components within normal limits   URINALYSIS WITH CULTURE REFLEX - Abnormal; Notable for the following components:    Glucose Urine 30 (*)     Ketones Urine 10 (*)     Protein Urine 70 (*)     All other components within normal limits   POCT GLUCOSE - Abnormal; Notable for the following components:    POC Glucose  178 (*)     All other components within normal limits   PROTHROMBIN TIME (PT) - Normal   TROPONIN I HIGH SENSITIVITY - Normal   TSH W REFLEX TO FREE T4 - Normal   CBC WITH DIFFERENTIAL WITH PLATELET   RAINBOW DRAW LAVENDER   RAINBOW DRAW LIGHT GREEN   RAINBOW DRAW BLUE   RAINBOW DRAW GOLD     EKG    Rate, intervals and axes as noted on EKG Report.  Rate: 77  Rhythm: Sinus Rhythm  Reading: Normal sinus rhythm, right bundle branch block, no other acute signs of ischemia or abnormal intervals.                                MDM     89-year-old female history of recurrent UTIs, factor V Leiden, cholecystectomy, presents today after a fall.  Patient states that she got up to go to the bathroom early this morning and then woke up later on the floor.  She was found on the floor with a contusion on her occiput.  She states she is feeling nauseous but denies any recent fevers, chest pain, shortness of breath, heart palpitations, leg swelling, or recent medication changes or abnormal ingestions.  Does not take anticoagulation.    On exam, vitals normal, GCS 15, contusion on the occiput without bony deformity, PERRL, EOMI, reassuring cardiopulmonary exam, no stigmata of infection or other traumatic injury    Differential: Syncopal episode with head injury, concern for possible intracranial injury.  Etiology of syncope could include ACS, arrhythmia, PE, electrolyte abnormality, or infection.    Patient was given symptomatic management in the ED   Head CT reassuring.  I independently reviewed the patient's chest x-ray.  No clear evidence of consolidation or pneumothorax.    Lab workup also relatively reassuring.  Patient had been ordered for D-dimer due to syncope and her history of factor V Leiden off anticoagulation.  D-dimer positive and CT PE ordered.  CT negative for PE.    While in the ED, patient remained hemodynamically stable.  She has some continued nausea but improved with Zofran.  Patient states she lives independently and is worried about going home without assistance.  Given her risk profile, will admit for monitoring and further evaluation as indicated.     Admission disposition: 7/18/2025 11:52 AM           Medical Decision Making      Disposition and Plan     Clinical Impression:  1. Syncope and collapse    2. Contusion of scalp, initial encounter    3. Nausea and vomiting in adult         Disposition:  Admit  7/18/2025 11:52 am    Follow-up:  No follow-up provider specified.  We recommend that you schedule follow up care with a primary care provider within the next three months to obtain basic health screening including reassessment of your blood pressure.      Medications Prescribed:  Current Discharge Medication List                Supplementary Documentation:         Hospital Problems       Present on Admission  Date Reviewed: 4/24/2025          ICD-10-CM Noted POA    Syncope R55 7/18/2025 Unknown

## 2025-07-18 NOTE — CM/SW NOTE
07/18/25 1400   CM/SW Referral Data   Referral Source Social Work (self-referral)   Reason for Referral Discharge planning   Informant Patient   Medical Hx   Does patient have an established PCP? Yes  (Dereck Painting ([facility]) & Marce Smith (no longer [facility]))   Patient Info   Patient's Current Mental Status at Time of Assessment Alert;Oriented   Patient's Home Environment Independent Living  ([facility])   Patient lives with Alone   Patient Status Prior to Admission   Independent with ADLs and Mobility No   Pt. requires assistance with Ambulating;Housework   Services in place prior to admission DME/Supplies at home;[facility]   Type of DME/Supplies Straight Cane;Standard Walker;Gr[employer] Bars   [facility] Home Care Provider Private Duty   [facility] Care hours per day 12  (8am to 8pm)   [facility] Care days per week 7   Discharge Needs   Anticipated D/C needs To be determined   Choice of Post-Acute Provider   Informed patient of right to choose their preferred provider Yes   Patient declines recommended services Yes  (feels HH not necessary, pt stating she can do on her own)     SW self referred pt for DC Planning.  Per chart, pt is from [facility]. SW confirmed this w/ liaison Patricia. Per Liaison and pt's [facility] RN, pt has CG - unsure of exact hours.    Met w/ pt at bedside. Above assessment completed.  Verified pt's home address and living at [facility].    Pt confirmed she has been using a walker for ambulation but has been doing so less and less. Pt is hopeful she will work to the point of no longer needing it.    Pt confirmed she has a CG that comes 7 days/week from 8am to 8pm. Pt stating she has had the CG approx 1 month. Pt stated she was starting to decrease CG's hours and today was the first day for that.     Pt denies hx w/ HH or other rehab services.  SW discussed potential HH upon return to [facility]. 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 Pt confirmed she will  decline HH services if anticipated at [facility].    PLAN: [facility], declining HH - pending med clear      SW/CM to remain available for support and/or discharge planning.       KIMBERLY Amaro, LSW [phone]

## 2025-07-18 NOTE — H&P
[facility] Hospitalist H&P       CC:   Chief Complaint   Patient presents with    Fall    Nausea/Vomiting/Diarrhea        PCP: Dereck Painting MD    History of Present Illness: Patient is a 89 year old female past medical history of hypothyroidism, chronic low back and hip pain saw primary care provider yesterday for this, felt to be related to neurogenic claudication and lumbar stenosis, she was advised to start taking duloxetine but reports she had not started this medication she takes over-the-counter pain meds and anti-inflammatory meds in the morning.  She states she woke up around 3 or 4 in the morning when she typically takes her thyroid medicine then once again at 6 in the morning and took some NSAIDs the next thing she knows she woke up on the ground.   She was not aware of falling does not recall feeling ill she did have 1 episode of nausea in the evening preceding this event and took some Zofran prior to bed, she did feel slightly nauseous in the morning as well did not have any diarrhea, however she did have 1 episode of emesis nonbloody nonbilious.  She had no fevers sick contacts or recent travel.  She lives at independent living at [facility] and manages her own medications.   After her fall when she woke up on the ground she was able to crawl to her life alert bracelet which prompted EMS to bring her to the hospital for further management.     Workup in the emergency department revealed mild hyponatremia slight hyperglycemia  She was found to have an elevated D-dimer and a CT PE was negative for acute PE  Of note she had no leukocytosis and labs otherwise including electrolytes were unremarkable  UA notable for some slight proteinuria and mild ketones in the urine  TSH was also checked and was normal      PMH  Past Medical History[1]     PSH  Past Surgical History[2]     ALL:  Allergies[3]     Home Medications:  Medications Taking[4]      Soc Hx  Social History     Tobacco Use     Smoking status: Never    Smokeless tobacco: Never   Substance Use Topics    Alcohol use: No     Alcohol/week: 0.0 standard drinks of alcohol        Fam Hx  Family History[5]    Review of Systems  Comprehensive ROS reviewed and negative except for what's stated above.     OBJECTIVE:  /52   Pulse 83   Temp 97.6 °F (36.4 °C) (Temporal)   Resp 15   Wt 120 lb (54.4 kg)   SpO2 94%   BMI 21.95 kg/m²   General:  Alert, no distress   Head:  Soft tissue bruising ecchymosis over the occiput   Eyes:  Sclera anicteric   Nose: Nares normal   Throat: Lips, mucosa, and tongue normal   Neck: Supple, symmetrical, trachea midline   Lungs:   Clear to auscultation bilaterally   Chest wall:  No tenderness or deformity.   Heart:  Regular rate and rhythm, S1, S2 normal   Abdomen:   Soft, non-tender. Bowel sounds normal   Extremities: Slight ecchymosis over the right olecranon   Skin: Skin color, texture, turgor normal. No rashes or lesions.    Neurologic: Normal strength, no focal deficit appreciated     Diagnostic Data:    CBC/Chem  Recent Labs   Lab 07/18/25  0712   WBC 8.1   HGB 13.7   MCV 93.0   .0   INR 0.91       Recent Labs   Lab 07/18/25  0712   *   K 3.7      CO2 20.0*   BUN 14   CREATSERUM 0.74   *   CA 8.9       Recent Labs   Lab 07/18/25  0712   ALT 18   AST 20   ALB 4.4       No results for input(s): \"TROP\" in the last 168 hours.    Additional Diagnostics: ECG: Normal sinus rhythm incomplete right bundle branch block    CXR: image personally reviewed overall normal appearing    Radiology: CT CHEST PE AORTA (IV ONLY) (CPT=71260)  Result Date: 7/18/2025  CONCLUSION: 1.  No evidence of acute pulmonary embolism to the level of the first order subsegmental pulmonary artery branches. 2.  Scattered atelectasis without further acute intrathoracic process. 3.  Lesser incidental findings as above. Electronically Verified and Signed by Attending Radiologist: Alex Frank MD 7/18/2025 11:34 AM  Workstation: [id number]    XR CHEST AP PORTABLE  (CPT=71045)  Result Date: 7/18/2025  CONCLUSION: Prominent perihilar and bibasilar pulmonary markings. Improved aeration at the lung bases. No acute airspace consolidation or pleural effusion. Electronically Verified and Signed by Attending Radiologist: Rich Mtz MD 7/18/2025 8:15 AM Workstation: [id number]    CT BRAIN OR HEAD (CPT=70450)  Result Date: 7/18/2025  CONCLUSION: No acute intracranial process by noncontrast CT technique. Soft tissue injury overlying the right posterior parietal-occipital calvarium. Intracranial atherosclerosis. Stable chronic fracture deformity at the right mandibular condyle. Electronically Verified and Signed by Attending Radiologist: Elias Parks MD 7/18/2025 7:41 AM Workstation: [id number]        ASSESSMENT / PLAN:   Patient is a 89 year old female past medical history of hypothyroidism, chronic low back and hip pain saw primary care provider yesterday for this, felt to be related to neurogenic claudication and lumbar stenosis, who presents with syncope with loss of consciousness without clear etiology.     Syncope and loss of consciousness appears to have fallen backwards and hit her head and right elbow  - She does not recall any preevent episodes she does not even recall falling the only thing she recalls is waking up on the ground she reports she was in bare feet and that it is possible she slipped  - There is no witnesses and she currently feels well  - The fact that she had preceding nausea and 1 episode of emesis in combination with mild ketones in the urine and mild hyponatremia suggest some dehydration component could be contributing  - Otherwise workup is unremarkable  - Will check echocardiogram  Monitor on telemetry  Hydrate gently with saline  Repeat CMP tomorrow  Given elevation in glucose we will check A1c  TSH is normal  CT head and CT chest have been reviewed    2.  Hypothyroidism continue home send    3.   Nausea with emesis without clear etiology  Alk phos is mildly elevated will.  CMP tomorrow  Mild non-anion gap acidosis likely as a result of emesis repeat CMP tomorrow      FN:  - IVF:Saline   - Diet:General diet     DVT Prophy:Heparin TID   Lines:PIV    DNR select confirmed on admission with daughter and patient at bedside      Dispo: pending clinical course, admit under obs, will need to contact coordinator at discharge   Expect discharge tomorrow pending work-up     Outpatient records or previous hospital records reviewed.     Further recommendations pending patient's clinical course.  [facility] hospitalist to continue to follow patient while in house    Patient and/or patient's family given opportunity to ask questions and note understanding and agreeing with therapeutic plan as outlined    Thank You,  Syed Monsivais DO    Hospitalist with [facility]  Answering Service number: [phone]         [1]   Past Medical History:   Blood disorder    Cataract    Disorder of thyroid    Factor V Leiden mutation (HCC)    High cholesterol    Osteopenia    OTHER DISEASES    pancreatitis X 2    Problems with swallowing    Recurrent urinary tract infection   [2]   Past Surgical History:  Procedure Laterality Date    Cataract      Cholecystectomy      because of pancreatic pseudocyst which was also removed.    Colonoscopy       - had upper and lower endoscopy; ; Dr Lagunas because of anemia - normal.    Excisional biospy right  1973    Hysterectomy      ORA/BSO in 's    Ir vertebroplasty            Other surgical history  11    cysto-dr. gerard   [3]   Allergies  Allergen Reactions    Cephalexin OTHER (SEE COMMENTS)     Pt does not remember.     Codeine [Opioid Analgesics] NAUSEA AND VOMITING    Etodolac OTHER (SEE COMMENTS)    Morphine NAUSEA AND VOMITING    Terbinafine OTHER (SEE COMMENTS)     Medication was not working.    Tramadol NAUSEA AND VOMITING    Ciprofloxacin DIZZINESS    Lamisil DIARRHEA     Omeprazole NAUSEA AND VOMITING   [4]   Outpatient Medications Marked as Taking for the 7/18/25 encounter (Hospital Encounter)   Medication Sig Dispense Refill    levothyroxine 88 MCG Oral Tab Take 1 tablet (88 mcg total) by mouth before breakfast.      DULoxetine 20 MG Oral Cap DR Particles Take 1 capsule (20 mg total) by mouth daily.      Cholecalciferol (VITAMIN D) 50 MCG (2000 UT) Oral Tab Take 1 tablet by mouth daily.      acetaminophen 500 MG Oral Tab Take 2 tablets (1,000 mg total) by mouth every 6 (six) hours as needed for Pain.     [5]   Family History  Problem Relation Age of Onset    Other (Other) Father         cva    Cancer Mother 80        breast cancer    Breast Cancer Mother 80        80    Cancer Other         breast    Breast Cancer Maternal Aunt 82        82

## 2025-07-18 NOTE — ED INITIAL ASSESSMENT (HPI)
Pt brought to ed via ems for c/o of vomiting. Pt was found on the ground with unwitnessed fall. Pt alert upon arrival. Pt states getting up at 6 am to take some pills and get some water and woke up on the floor. Denies thinners, bump on the back of her head.

## 2025-07-19 ENCOUNTER — APPOINTMENT (OUTPATIENT)
Dept: CV DIAGNOSTICS | Facility: HOSPITAL | Age: OVER 89
End: 2025-07-19
Attending: INTERNAL MEDICINE
Payer: MEDICARE

## 2025-07-19 VITALS
OXYGEN SATURATION: 96 % | HEART RATE: 79 BPM | BODY MASS INDEX: 22 KG/M2 | RESPIRATION RATE: 18 BRPM | SYSTOLIC BLOOD PRESSURE: 148 MMHG | DIASTOLIC BLOOD PRESSURE: 63 MMHG | WEIGHT: 120.81 LBS | TEMPERATURE: 98 F

## 2025-07-19 LAB
ALBUMIN SERPL-MCNC: 4 G/DL (ref 3.2–4.8)
ALBUMIN/GLOB SERPL: 2.2 (ref 1–2)
ALP LIVER SERPL-CCNC: 33 U/L (ref 55–142)
ALT SERPL-CCNC: 14 U/L (ref 10–49)
ANION GAP SERPL CALC-SCNC: 6 MMOL/L (ref 0–18)
AST SERPL-CCNC: 16 U/L (ref ?–34)
BILIRUB SERPL-MCNC: 0.7 MG/DL (ref 0.2–0.9)
BUN BLD-MCNC: 18 MG/DL (ref 9–23)
BUN/CREAT SERPL: 25.7 (ref 10–20)
CALCIUM BLD-MCNC: 8.7 MG/DL (ref 8.7–10.4)
CHLORIDE SERPL-SCNC: 106 MMOL/L (ref 98–112)
CO2 SERPL-SCNC: 26 MMOL/L (ref 21–32)
CREAT BLD-MCNC: 0.7 MG/DL (ref 0.55–1.02)
DEPRECATED RDW RBC AUTO: 46.1 FL (ref 35.1–46.3)
EGFRCR SERPLBLD CKD-EPI 2021: 82 ML/MIN/1.73M2 (ref 60–?)
ERYTHROCYTE [DISTWIDTH] IN BLOOD BY AUTOMATED COUNT: 13.3 % (ref 11–15)
GLOBULIN PLAS-MCNC: 1.8 G/DL (ref 2–3.5)
GLUCOSE BLD-MCNC: 104 MG/DL (ref 70–99)
HCT VFR BLD AUTO: 35.9 % (ref 35–48)
HGB BLD-MCNC: 11.7 G/DL (ref 12–16)
MCH RBC QN AUTO: 30.5 PG (ref 26–34)
MCHC RBC AUTO-ENTMCNC: 32.6 G/DL (ref 31–37)
MCV RBC AUTO: 93.7 FL (ref 80–100)
OSMOLALITY SERPL CALC.SUM OF ELEC: 288 MOSM/KG (ref 275–295)
PLATELET # BLD AUTO: 198 10(3)UL (ref 150–450)
POTASSIUM SERPL-SCNC: 4.1 MMOL/L (ref 3.5–5.1)
PROT SERPL-MCNC: 5.8 G/DL (ref 5.7–8.2)
RBC # BLD AUTO: 3.83 X10(6)UL (ref 3.8–5.3)
SODIUM SERPL-SCNC: 138 MMOL/L (ref 136–145)
WBC # BLD AUTO: 5.2 X10(3) UL (ref 4–11)

## 2025-07-19 PROCEDURE — 96372 THER/PROPH/DIAG INJ SC/IM: CPT

## 2025-07-19 PROCEDURE — 93306 TTE W/DOPPLER COMPLETE: CPT | Performed by: INTERNAL MEDICINE

## 2025-07-19 PROCEDURE — 80053 COMPREHEN METABOLIC PANEL: CPT | Performed by: INTERNAL MEDICINE

## 2025-07-19 PROCEDURE — 85027 COMPLETE CBC AUTOMATED: CPT | Performed by: INTERNAL MEDICINE

## 2025-07-19 PROCEDURE — 96361 HYDRATE IV INFUSION ADD-ON: CPT

## 2025-07-19 NOTE — DISCHARGE INSTRUCTIONS
1. Left ventricle: The cavity size was normal. Wall thickness was normal.      Systolic function was vigorous. The estimated ejection fraction was      65-70%, by biplane method of disks. No diagnostic evidence for regional      wall motion abnormalities. Left ventricular diastolic function parameters      were normal.   2. Aortic valve: There was mild regurgitation.   3. Mitral valve: There was mild regurgitation.       No structural cause for fall or passing out.   Based on the echo you look dehydrated.  Please make sure to drink plenty of water.

## 2025-07-19 NOTE — PLAN OF CARE
Christina is cleared for discharge home. AVS reviewed at bedside. Transported home via private vehicle.     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  - Provide timely, complete, and accurate information to patient/family  - Incorporate patient and family knowledge, values, beliefs, and cultural backgrounds into the planning and delivery of care  - Encourage patient/family to participate in care and decision-making at the level they choose  - Honor patient and family perspectives and choices  Outcome: Adequate for Discharge     Problem: PAIN - ADULT  Goal: Verbalizes/displays adequate comfort level or patient's stated pain goal  Description: INTERVENTIONS:  - Encourage pt to monitor pain and request assistance  - Assess pain using appropriate pain scale  - Administer analgesics based on type and severity of pain and evaluate response  - Implement non-pharmacological measures as appropriate and evaluate response  - Consider cultural and social influences on pain and pain management  - Manage/alleviate anxiety  - Utilize distraction and/or relaxation techniques  - Monitor for opioid side effects  - Notify MD/LIP if interventions unsuccessful or patient reports new pain  - Anticipate increased pain with activity and pre-medicate as appropriate  Outcome: Adequate for Discharge     Problem: SAFETY ADULT - FALL  Goal: Free from fall injury  Description: INTERVENTIONS:  - Assess pt frequently for physical needs  - Identify cognitive and physical deficits and behaviors that affect risk of falls.  - [place] fall precautions as indicated by assessment.  - Educate pt/family on patient safety including physical limitations  - Instruct pt to call for assistance with activity based on assessment  - Modify environment to reduce risk of injury  -  Provide assistive devices as appropriate  - Consider OT/PT consult to assist with strengthening/mobility  - Encourage toileting schedule  Outcome: Adequate for Discharge

## 2025-07-19 NOTE — PLAN OF CARE
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  - Provide timely, complete, and accurate information to patient/family  - Incorporate patient and family knowledge, values, beliefs, and cultural backgrounds into the planning and delivery of care  - Encourage patient/family to participate in care and decision-making at the level they choose  - Honor patient and family perspectives and choices  7/19/2025 0005 by Kristina Mike RN  Outcome: Progressing  7/19/2025 0005 by Kristina Mike, RN  Outcome: Progressing     Problem: PAIN - ADULT  Goal: Verbalizes/displays adequate comfort level or patient's stated pain goal  Description: INTERVENTIONS:  - Encourage pt to monitor pain and request assistance  - Assess pain using appropriate pain scale  - Administer analgesics based on type and severity of pain and evaluate response  - Implement non-pharmacological measures as appropriate and evaluate response  - Consider cultural and social influences on pain and pain management  - Manage/alleviate anxiety  - Utilize distraction and/or relaxation techniques  - Monitor for opioid side effects  - Notify MD/LIP if interventions unsuccessful or patient reports new pain  - Anticipate increased pain with activity and pre-medicate as appropriate  Outcome: Progressing     Problem: SAFETY ADULT - FALL  Goal: Free from fall injury  Description: INTERVENTIONS:  - Assess pt frequently for physical needs  - Identify cognitive and physical deficits and behaviors that affect risk of falls.  - [place] fall precautions as indicated by assessment.  - Educate pt/family on patient safety including physical limitations  - Instruct pt to call for assistance with activity based on assessment  - Modify environment to reduce risk of injury  - Provide assistive devices as  appropriate  - Consider OT/PT consult to assist with strengthening/mobility  - Encourage toileting schedule  Outcome: Progressing

## 2025-07-19 NOTE — PLAN OF CARE
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

## 2025-07-19 NOTE — DISCHARGE SUMMARY
Hospitalist Discharge Summary    Admission Date/Time  7/18/2025  7:00 AM  Discharge Date  07/19/25    PCP  Dereck Painting MD     Discharging Hospitalist:  Faiza Good MD    Disposition:  Home to [facility] patient  declined PT eval    Follow Up Appointments  No follow-up provider specified.  Appointments to be made by PCP: Derricky coodinator notified to make appt. patient is aware they are going to call to schedule follow-up with Dr. Severtson    [facility] Hospital Problems/Hospital Course Summary  Patient is a 89 year old female past medical history of hypothyroidism, chronic low back and hip pain saw primary care provider yesterday for this, felt to be related to neurogenic claudication and lumbar stenosis, who presents with syncope with loss of consciousness without clear etiology.  Without major echo with hyperdynamic EF, no major valvular disorder.  Appeared on the dry side per discussion with cardiology.  No events on telemetry.  Patient did well with ambulating independently and did not want a wait for PT.     Syncope and loss of consciousness appears to have fallen backwards and hit her head and right elbow  - She does not recall any preevent episodes she does not even recall falling the only thing she recalls is waking up on the ground she reports she was in bare feet and that it is possible she slipped  - There is no witnesses and she currently feels well  - The fact that she had preceding nausea and 1 episode of emesis in combination with mild ketones in the urine and mild hyponatremia suggest some dehydration component could be contributing, BNP improved, still appears dry on echo, discussed with patient to increase hydration.  - Otherwise workup is unremarkable  See echo above, no events on telemetry  Monitor on telemetry  Hydrate gently with saline  TSH is normal  CT head and CT chest have been reviewed       Medication Changes  N/a    Important Follow Up Items    N/a    Procedures/Diagnostics  ECHO, normal EF, mild regurge, per Cardiology appeared dry     Change in Code Status: dnar select     Discharge Medications       Medication List        CONTINUE taking these medications      acetaminophen 500 MG Tabs  Commonly known as: Tylenol Extra Strength     Biotin Plus/Calcium/Vit D3 Tabs     DULoxetine 20 MG Cpep  Commonly known as: Cymbalta     levothyroxine 88 MCG Tabs  Commonly known as: Synthroid     nitrofurantoin monohydrate macro 100 MG Caps  Commonly known as: Macrobid     Vitamin D 50 MCG (2000) Tabs            STOP taking these medications      NITROFURANTOIN OR            I reconciled current and discharge medications on the day of discharge.    Imaging/Diagnostic Reports  CARD ECHO 2D DOPPLER (CPT=93306)  Result Date: 2025  Transthoracic Echocardiogram Name:Christina Giraldo Date: 2025 :  1933 Ht:  (62in)  BP: 142 / 65 MRN:  [id number]    Age:  89years    Wt:  (121lb) HR: Loc:  [facility]       Gndr: F          BSA: 1.54m^2 Sonographer: Margot LOCKWOOD Ordering:    Syed Monsivais ---------------------------------------------------------------------------- History/Indications:   Syncope.  Fall. ---------------------------------------------------------------------------- Procedure information:  A transthoracic complete 2D study was performed. Additional evaluation included M-mode, complete spectral Doppler, and color Doppler.  Patient status:  Inpatient.  Location:  Bedside.    Comparison was made to the study of 2024.    This was a routine study. Transthoracic echocardiography for diagnosis and ventricular function evaluation. Image quality was adequate. ECG rhythm:   Normal sinus ---------------------------------------------------------------------------- Conclusions: 1. Left ventricle: The cavity size was normal. Wall thickness was normal.    Systolic function was vigorous. The estimated ejection fraction was    65-70%, by biplane method of  disks. No diagnostic evidence for regional    wall motion abnormalities. Left ventricular diastolic function parameters    were normal. 2. Aortic valve: There was mild regurgitation. 3. Mitral valve: There was mild regurgitation. * ---------------------------------------------------------------------------- * Findings: Left ventricle:  The cavity size was normal. Wall thickness was normal. Systolic function was vigorous. The estimated ejection fraction was 65-70%, by biplane method of disks. No diagnostic evidence for diffuse regional wall motion abnormalities. No diagnostic evidence for regional wall motion abnormalities. Left ventricular diastolic function parameters were normal. Ventricular septum:   Thickness was normal. Left atrium:  The atrium was normal in size. Right ventricle:  The cavity size was normal. Systolic function was normal. Right atrium:  The atrium was normal in size. Mitral valve:  The valve was structurally normal. The leaflets were normal thickness. Leaflet separation was normal.  Doppler:  Transvalvular velocity was within the normal range. There was no evidence for stenosis. There was mild regurgitation. Aortic valve:  The valve was structurally normal. The valve was trileaflet. The leaflets were normal thickness. Cusp separation was normal.  Doppler: Transvalvular velocity was within the normal range. There was no evidence for stenosis. There was mild regurgitation. Tricuspid valve:  The valve is structurally normal. Leaflet separation was normal.  Doppler:  Transvalvular velocity was within the normal range. There was no evidence for stenosis. There was trivial regurgitation. Pulmonic valve:   The valve is structurally normal. Cusp separation was normal.  Doppler:  Transvalvular velocity was within the normal range. There was no evidence for stenosis. There was no significant regurgitation. Pericardium:   There was no pericardial effusion. Aorta: Aortic root: The aortic root was  normal-sized. Ascending aorta: The ascending aorta was normal. Pulmonary arteries: The main pulmonary artery was normal-sized.  Systolic pressure could not be accurately estimated. Systemic veins:  Central venous respirophasic diameter changes are blunted (< 50%). Inferior vena cava: The IVC was normal-sized. ---------------------------------------------------------------------------- Measurements  Left ventricle                    Value        Ref  IVS thickness, ED, PLAX           0.8   cm     0.6 - 0.9  LV ID, ED, PLAX                   3.9   cm     3.8 - 5.2  LV ID, ES, PLAX                   3.0   cm     2.2 - 3.5  LV PW thickness, ED, PLAX         0.8   cm     0.6 - 0.9  IVS/LV PW ratio, ED, PLAX         1.00         ---------  LV PW/LV ID ratio, ED, PLAX       0.21         ---------  LV ejection fraction          (L) 47    %      54 - 74  Stroke volume/bsa, 2D             32    ml/m^2 ---------  LV end-diastolic volume, 1-p      53    ml     48 - 140  A4C  LV ejection fraction, 1-p A4C     66    %      46 - 78  Stroke volume, 1-p A4C            35    ml     ---------  LV end-diastolic volume/bsa,      34    ml/m^2 30 - 82  1-p A4C  Stroke volume/bsa, 1-p A4C        23    ml/m^2 ---------  LV end-diastolic volume, 2-p  (L) 45    ml     46 - 106  LV end-systolic volume, 2-p       16    ml     14 - 42  LV ejection fraction, 2-p         65    %      54 - 74  Stroke volume, 2-p                29    ml     ---------  LV end-diastolic volume/bsa,      29    ml/m^2 29 - 61  2-p  LV end-systolic volume/bsa,       10    ml/m^2 8 - 24  2-p  Stroke volume/bsa, 2-p            18.8  ml/m^2 ---------  LV e', lateral                (L) 9.6   cm/sec >=10.0  LV E/e', lateral                  11           <=13  LV e', medial                     7.5   cm/sec >=7.0  LV E/e', medial                   14           ---------  LV e', average                    8.5   cm/sec ---------  LV E/e', average                  12            <=14  LVOT                              Value        Ref  LVOT ID                           1.7   cm     ---------  LVOT peak velocity, S             1.04  m/sec  ---------  LVOT VTI, S                       21.0  cm     ---------  LVOT peak gradient, S             4     mm Hg  ---------  LVOT mean gradient, S             2     mm Hg  ---------  Stroke volume (SV), LVOT DP       48    ml     ---------  Stroke index (SV/bsa), LVOT       31    ml/m^2 ---------  DP  Aortic valve                      Value        Ref  Aortic pressure half-time         459   ms     ---------  Aortic regurg velocity, ED        4.39  m/sec  ---------  Aortic regurg deceleration        309   cm/s^2 ---------  Aortic regurg pressure            417   ms     ---------  half-time  Aortic regurg gradient, ED        77    mm Hg  ---------  Aortic root                       Value        Ref  Aortic root ID                    2.7   cm     2.3 - 3.8  Aortic root ID, STJ, ED           2.3   cm     2.0 - 3.2  Aortic root ID, ED, MM            3.0   cm     ---------  Ascending aorta                   Value        Ref  Ascending aorta ID                2.6   cm     1.9 - 3.5  Left atrium                       Value        Ref  LA ID, A-P, ES                (H) 3.9   cm     2.7 - 3.8  LA volume, S                      36    ml     22 - 52  LA volume/bsa, S                  23    ml/m^2 16 - 34  LA volume, ES, 1-p A4C            35    ml     22 - 52  LA volume, ES, 1-p A2C            33    ml     22 - 52  LA volume, ES, A/L                38    ml     ---------  LA volume/bsa, ES, A/L            25    ml/m^2 16 - 34  LA/aortic root ratio              1.44         ---------  Mitral valve                      Value        Ref  Mitral E-wave peak velocity       1.05  m/sec  ---------  Mitral A-wave peak velocity       1.09  m/sec  ---------  Mitral deceleration time          243   ms     ---------  Mitral peak gradient, D           4     mm Hg  ---------   Mitral E/A ratio, peak            1            ---------  Mitral regurg peak velocity       426   cm/sec ---------  Mitral peak LV-LA gradient, S     72.59 mm Hg  ---------  Mitral maximal regurg             393   cm/sec ---------  velocity, PISA  Pulmonary artery                  Value        Ref  PA pressure, S, DP                29    mm Hg  ---------  Tricuspid valve                   Value        Ref  Tricuspid regurg peak             2.3   m/sec  <=2.8  velocity  Tricuspid peak RV-RA gradient     21    mm Hg  ---------  Systemic veins                    Value        Ref  Estimated CVP                     8     mm Hg  ---------  Inferior vena cava                Value        Ref  ID                                2.0   cm     <=2.1  Right ventricle                   Value        Ref  TAPSE, MM                         1.88  cm     >=1.70  RV pressure, S, DP                29    mm Hg  ---------  RV s', lateral                    14.8  cm/sec >=9.5 Legend: (L)  and  (H)  duane values outside specified reference range. ---------------------------------------------------------------------------- Prepared and electronically signed by Timothy Hill MD 07/19/2025 11:55     CT CHEST PE AORTA (IV ONLY) (CPT=71260)  Result Date: 7/18/2025  PROCEDURE: CT CHEST PE AORTA (IV ONLY) (CPT=71260) INDICATIONS: 89-year-old female with fall, nausea, and abdominal pain. COMPARISON: 01/17/2024 CT CHEST PE AORTA (IV ONLY) (CPT=71260) TECHNIQUE: Multidetector CT images of the chest were obtained with non-ionic intravenous contrast material. Automated exposure control for dose reduction was used. Adjustment of the mA and/or kV was done based on the patient's size. Iterative reconstruction technique for dose reduction was employed. Dose information was transmitted to the ACR (American College of Radiology) NRDR (National Radiology Data Registry), which includes the Dose Index Registry. Multiplanar reformats and maximum intensity  projection images were created. CONTRAST: IOPAMIDOL 76% IV SOLN FOR POWER INJECTOR:70 mL. FINDINGS: COMMENT: Overall examination quality is degraded by beam hardening artifact throughout the imaged volume secondary to the patient's bilateral arm-down positioning. VASCULATURE: There is adequate opacification of the pulmonary arterial tree. No suspicious filling defects are identified in the main, lobar, segmental, or proximal subsegmental pulmonary artery branches to suggest acute pulmonary embolism. The distal subsegmental branches are less well assessed. The main pulmonary artery trunk is normal in caliber, measuring 2.5 cm. CARDIAC: The heart is not enlarged. There is no bowing of the interventricular septum to suggest right ventricular strain. Atherosclerotic vascular calcifications are present in the coronary vessels. THORACIC AORTA: Unremarkable configuration without aneurysm or dissection. LUNGS/PLEURA: There is dependent subsegmental atelectasis bilaterally. Additional scattered ground-glass and reticular opacities are present and may be atelectatic in origin. No airspace consolidation, pleural effusion, or pneumothorax is detected.  AIRWAYS: The tracheobronchial tree is without central mass or obstructing lesion. MEDIASTINUM/SERGIO: No mass or lymphadenopathy. CHEST WALL: No axillary mass or lymphadenopathy. There may be pectus excavatum deformity with indentation of the ventral cardiac margin. LIMITED ABDOMEN: Within the parameters of the arterial phase of contrast-enhancement, the included upper abdomen is unremarkable. BONES: A background of diffuse osteopenia is demonstrated. Mild compression deformity of the superior endplate of the T6 is noted. Kyphoplasty changes of L1 are apparent. Mild degenerative changes are seen throughout the spine. Degenerative changes shoulders are present bilaterally. OTHER: A small hiatal hernia is evident.     CONCLUSION: 1.  No evidence of acute pulmonary embolism to the  level of the first order subsegmental pulmonary artery branches. 2.  Scattered atelectasis without further acute intrathoracic process. 3.  Lesser incidental findings as above. Electronically Verified and Signed by Attending Radiologist: Alex Frank MD 7/18/2025 11:34 AM Workstation: [id number]    XR CHEST AP PORTABLE  (CPT=71045)  Result Date: 7/18/2025  PROCEDURE: XR CHEST AP PORTABLE  (CPT=71045) TIME: 7:13 a.m. INDICATIONS: Thyroid disorder. Nausea and vomiting. History recurrent UTI COMPARISON: CXR 1/17/2024 TECHNIQUE: Single AP chest radiograph was obtained. FINDINGS:  CARDIAC/VASC: Normal heart size. MEDIASTINUM/SERGIO: Tortuous atherosclerotic aorta. LUNGS/PLEURA: Prominent perihilar and basilar pulmonary markings. Improved aeration at the lung bases since prior chest. No acute airspace consolidation. BONES: S-shaped scoliosis dorsolumbar spine. Kyphoplasty changes T12 and L2 levels. OTHER: Negative.     CONCLUSION: Prominent perihilar and bibasilar pulmonary markings. Improved aeration at the lung bases. No acute airspace consolidation or pleural effusion. Electronically Verified and Signed by Attending Radiologist: Rich Mtz MD 7/18/2025 8:15 AM Workstation: [id number]    CT BRAIN OR HEAD (CPT=70450)  Result Date: 7/18/2025  PROCEDURE: CT BRAIN OR HEAD (CPT=70450) INDICATIONS: n/v fall PATIENT STATED HISTORY: Fall; Nausea/Vomiting/Diarrhea COMPARISON: 10/07/2024 CT BRAIN OR HEAD (CPT=70450) TECHNIQUE: Multidetector CT images of the brain were obtained without the infusion of non-ionic intravenous contrast material. Automated exposure control for dose reduction was used. Adjustment of the mA and/or kV was done based on the patient's size. Iterative reconstruction technique for dose reduction was employed. FINDINGS: CSF SPACES: No hydrocephalus. No acute extraaxial hemorrhage. CEREBRUM: No acute intraparenchymal hemorrhage. There is no mass effect. No significant midline shift. Gray-white matter  differentiation is well-maintained. CEREBELLUM: No edema, hemorrhage, mass, or acute infarction is seen. BRAINSTEM: No edema, hemorrhage, mass, or acute infarction is seen. CALVARIUM: Stable chronic fracture deformity of the right mandibular condyle with mild asymmetric right temporomandibular joint osteoarthritis. Soft tissue injury overlying the right posterior parietal-occipital calvarium. SINUSES: Small left posterior ethmoid sinus retention cyst. ORBITS: Limited views are notable for postoperative changes of the lenses bilaterally. OTHER: Atherosclerotic vascular calcifications are perceived in the carotid siphons and distal vertebral arteries.     CONCLUSION: No acute intracranial process by noncontrast CT technique. Soft tissue injury overlying the right posterior parietal-occipital calvarium. Intracranial atherosclerosis. Stable chronic fracture deformity at the right mandibular condyle. Electronically Verified and Signed by Attending Radiologist: Elias Parks MD 7/18/2025 7:41 AM Workstation: [id number]      Secondary Discharge Diagnoses  Hypothyroidism continue home   Med    [place] with emesis x 1, head Ct neg, resolved per pt, repeat BMP normal       Pertinent Physical Exam At Time of Discharge  Vitals:    07/18/25 2028 07/19/25 0500 07/19/25 0542 07/19/25 0928   BP: 132/54  142/65 148/63   BP Location: Right arm  Right arm Left arm   Pulse: 77  75 79   Resp: 18  22 18   Temp: 97.8 °F (36.6 °C)  98.3 °F (36.8 °C) 98 °F (36.7 °C)   TempSrc: Oral  Oral Oral   SpO2: 96%  95% 96%   Weight:  120 lb 12.8 oz (54.8 kg)          General: no acute distress  Heart: RRR  Lungs: CTAB  Abd: Soft, NT, ND  Neuro: no focal deficits    Total time coordinating care > 30 mins.    Patient had opportunity to ask questions and stated understanding and agreed with therapeutic plan as outlined.     Faiza Good MD  7/19/2025

## 2025-07-19 NOTE — CM/SW NOTE
07/19/25 1200   Discharge disposition   Expected discharge disposition Home or Self  ([facility])   Patient Declines Recommended Services Yes  (declined offer for HH and onsite PT at [facility])   Discharge transportation Private car     Per chart, pt has DC order for today.    Pt declined HH and offer for onsite PT at [facility] when SW met w/ her yesterday 7/18.    Liaison Patricia notified of pt's return today.    Pt is cleared from SW/CM stand point. POPPY Prado is aware.    PLAN: [facility], declined services      KIMBERLY Amaro, LSW [id number]

## (undated) DEVICE — GAMMEX® PI HYBRID SIZE 8, STERILE POWDER-FREE SURGICAL GLOVE, POLYISOPRENE AND NEOPRENE BLEND: Brand: GAMMEX

## (undated) DEVICE — APPLICATOR SKIN PREP 10.5ML HI LT ORNG 2% CHG

## (undated) DEVICE — SCD SLEEVE KNEE HI BLEND

## (undated) DEVICE — NEEDLE HYPO 18GA L1.5IN PNK HUB PVT SHLD BVL

## (undated) DEVICE — TRAY EPIDURAL PERIFIX 18GA

## (undated) DEVICE — SLEEVE KENDALL SCD EXPRESS MED

## (undated) DEVICE — SOL  .9 1000ML BTL

## (undated) DEVICE — OR TOWEL, 17" X 26" STERILE, BLUE: Brand: PREMIERPRO

## (undated) DEVICE — STERILE POLYISOPRENE POWDER-FREE SURGICAL GLOVES: Brand: PROTEXIS

## (undated) DEVICE — LARYNGOSCOPY: Brand: MEDLINE INDUSTRIES, INC.

## (undated) DEVICE — MICROLARYNGEAL ORAL/NASAL TRACHEAL TUBE CUFFED,MURPHY EYE: Brand: SHILEY

## (undated) DEVICE — 12 ML SYRINGE LUER-LOCK TIP: Brand: MONOJECT

## (undated) DEVICE — NEEDLE BLNT 18GA L1.5IN FILL DISP PRECISGLDE

## (undated) NOTE — MR AVS SNAPSHOT
[facility]  [address], [address]  [place]  [id number]               Thank you for choosing us for your health care visit with Kath Tan.  Mary Ellen Diaz MD.  We are glad to serve you and happy to provide you with this summary of you ? To best provide you care, patients receiving routine medications need to be seen at least once a year.  protocol for controlled substances:  Written prescriptions    ? Written prescriptions must be picked up in office. ?  Please allow the offic The patient will follow up in 2 months, but the patient will call me 1 week after having the injection to let me know how the injection worked. If the injections helps her, then I will have her do 2-3 weeks of the PT to stabilize this joint.     If the l sure you understand how and when to take each. Levothyroxine Sodium 75 MCG Tabs   TAKE ONE TABLET BY MOUTH ONCE DAILY   Commonly known as:  SYNTHROID, LEVOTHROID           Methenamine Hippurate 1 G Tabs   Take 1 tablet (1 g total) by mouth daily. Closed fracture of lumbar vertebra without mention of spinal cord injury ([facility] [address]   Chronic left-sided low back pain with left-sided sciatica   Chronic left sacroiliac joint pain   Lumbar radiculopathy   Order:  Physical Therapy - Internal        Comment:  [facility] · [address], [place] · [phone]        L3-4 stable>L4-5 unstable grade1 spondylolisthesis  (primary encounter diagnosis)  left L4-5 mod synovial cyst  L5-S1 lt mod far lat,L4-5 lt>rt mild-mod discs  L1- authorization numbers or be assured that none are required. You can then schedule your appointment. Failure to obtain required authorization numbers can create reimbursement difficulties for you.           Left SIJ injection    Functional Status questions c

## (undated) NOTE — LETTER
[facility]  [address], [place], [place]  Authorization for Surgical Operation and Procedure                                                                                           I hereby authorize EDGAR GAGE my physician and his/her assistants (if applicable), which may include medical students, residents, and/or fellows, to perform the following surgical operation/ procedure and administer such anesthesia as may be determined necessary by my physician: Operation/Procedure name (s) L4-5 LUMBAR EPIDURAL STEROID INJECTION on Christina Giraldo   2.   I recognize that during the surgical operation/procedure, unforeseen conditions may necessitate additional or different procedures than those listed above.  I, therefore, further authorize and request that the above-named surgeon, assistants, or designees perform such procedures as are, in their judgment, necessary and desirable.    3.   My surgeon/physician has discussed prior to my surgery the potential benefits, risks and side effects of this procedure; the likelihood of achieving goals; and potential problems that might occur during recuperation.  They also discussed reasonable alternatives to the procedure, including risks, benefits, and side effects related to the alternatives and risks related to not receiving this procedure.  I have had all my questions answered and I acknowledge that no guarantee has been made as to the result that may be obtained.    4.   Should the need arise during my operation/procedure, which includes change of level of care prior to discharge, I also consent to the administration of blood and/or blood products.  Further, I understand that despite careful testing and screening of blood or blood products by collecting agencies, I may still be subject to ill effects as a result of receiving a blood transfusion and/or blood products.  The following are some, but not all, of the potential risks that can occur:  fever and allergic reactions, hemolytic reactions, transmission of diseases such as Hepatitis, AIDS and Cytomegalovirus (CMV) and fluid overload.  In the event that I wish to have an autologous transfusion of my own blood, or a directed donor transfusion, I will discuss this with my physician.  Check only if Refusing Blood or Blood Products  I understand refusal of blood or blood products as deemed necessary by my physician may have serious consequences to my condition to include possible death. I hereby assume responsibility for my refusal and release the hospital, its personnel, and my physicians from any responsibility for the consequences of my refusal.    o  Refuse   5.   I authorize the use of any specimen, organs, tissues, body parts or foreign objects that may be removed from my body during the operation/procedure for diagnosis, research or teaching purposes and their subsequent disposal by hospital authorities.  I also authorize the release of specimen test results and/or written reports to my treating physician on the hospital medical staff or other referring or consulting physicians involved in my care, at the discretion of the Pathologist or my treating physician.    6.   I consent to the photographing or videotaping of the operations or procedures to be performed, including appropriate portions of my body for medical, scientific, or educational purposes, provided my identity is not revealed by the pictures or by descriptive texts accompanying them.  If the procedure has been photographed/videotaped, the surgeon will obtain the original picture, image, videotape or CD.  The hospital will not be responsible for storage, release or maintenance of the picture, image, tape or CD.    7.   I consent to the presence of a  or observers in the operating room as deemed necessary by my physician or their designees.    8.   I recognize that in the event my procedure results in extended  X-Ray/fluoroscopy time, I may develop a skin reaction.    9. If I have a Do Not Attempt Resuscitation (DNAR) order in place, that status will be suspended while in the operating room, procedural suite, and during the recovery period unless otherwise explicitly stated by me (or a person authorized to consent on my behalf). The surgeon or my attending physician will determine when the applicable recovery period ends for purposes of reinstating the DNAR order.  10. Patients having a sterilization procedure: I understand that if the procedure is successful the results will be permanent and it will therefore be impossible for me to inseminate, conceive, or bear children.  I also understand that the procedure is intended to result in sterility, although the result has not been guaranteed.   11. I acknowledge that my physician has explained sedation/analgesia administration to me including the risk and benefits I consent to the administration of sedation/analgesia as may be necessary or desirable in the judgment of my physician.    I CERTIFY THAT I HAVE READ AND FULLY UNDERSTAND THE ABOVE CONSENT TO OPERATION and/or OTHER PROCEDURE.     _________________________________________ _________________________________     ___________________________________  Signature of Patient     Signature of Responsible Person                   Printed Name of Responsible Person                              _________________________________________ ______________________________        ___________________________________  Signature of Witness         Date  Time         Relationship to Patient    STATEMENT OF PHYSICIAN My signature below affirms that prior to the time of the procedure; I have explained to the patient and/or his/her legal representative, the risks and benefits involved in the proposed treatment and any reasonable alternative to the proposed treatment. I have also explained the risks and benefits involved in refusal of the  proposed treatment and alternatives to the proposed treatment and have answered the patient's questions. If I have a significant financial interest in a co-management agreement or a significant financial interest in any product or implant, or other significant relationship used in this procedure/surgery, I have disclosed this and had a discussion with my patient.     _______________________________________________________________ _____________________________  (Signature of Physician)                                                                                         (Date)                                   (Time)  Patient Name: Christina Giraldo    : 1933   Printed: 2024      Medical Record #: [id number]                                              Page 1 of 1

## (undated) NOTE — ED AVS SNAPSHOT
Deyanira Nelson   MRN: [id number]    Department:  [facility] Emergency Department   Date of Visit:  1/7/2018           Disclosure     Insurance plans vary and the physician(s) referred by the ER may not be covered by your plan.  Please contact y within the next three months to obtain basic health screening including reassessment of your blood pressure.     IF THERE IS ANY CHANGE OR WORSENING OF YOUR CONDITION, CALL YOUR PRIMARY CARE PHYSICIAN AT ONCE OR RETURN IMMEDIATELY TO THE EMERGENCY DEPARTMEN

## (undated) NOTE — Clinical Note
[facility] SURGERY SCHEDULING FORM   [address] [place] [address] [facility]   [phone] (scheduling phone) [phone] (scheduling fax)     PATIENT INFORMATION   Patient Name:    Tammy Dubois   :    1933 [facility] will follow its Pre-Admission Assessment and Screening Policy for pre-admission testing. Physicians that require   additional testing must order this directly and have results faxed to [facility] at [phone].

## (undated) NOTE — MR AVS SNAPSHOT
[facility]  [address], [address] [facility]  Sylwia Deisy [phone]               Thank you for choosing us for your health care visit with Micaela Ramos.  Theodore Rodriguez MD.  We are glad to serve you and happy to provide you with this summary of you L3-4 & L4-5 mild grade 1 spondylolisthesis that are mildly unstable    L5-S1 left mod-severe/right mild-mod foraminal stenosis    Lumbar compression fracture, with routine healing, subsequent encounter    Thoracic compression fracture, with routine healin Assoc Dx:  Chronic left-sided low back pain with left-sided sciatica [M54.42, G89.29], Chronic left-sided thoracic back pain [M54.6, G89.29], Lumbar radiculopathy [M54.16], Spondylolisthesis, lumbar region [M43.16], Lumbar foraminal stenosis [M99.83], Lum · Refills are not addressed on weekends; covering physicians do not authorize routine medications on weekends. · No narcotics or controlled substances are refilled after noon on Fridays or by on call physicians.   · If your prescription is due for a refill Take 1 tablet by mouth daily as needed for Allergies. Commonly known as:  ZYRTEC ALLERGY           Estradiol 10 MCG Tabs   Place 10 mcg vaginally twice a week.    What changed:  when to take this   Commonly known as:  VAGIFEM           Levothyroxine Sodiu